# Patient Record
Sex: FEMALE | Race: WHITE | NOT HISPANIC OR LATINO | Employment: OTHER | URBAN - METROPOLITAN AREA
[De-identification: names, ages, dates, MRNs, and addresses within clinical notes are randomized per-mention and may not be internally consistent; named-entity substitution may affect disease eponyms.]

---

## 2017-01-03 ENCOUNTER — OFFICE VISIT (OUTPATIENT)
Dept: FAMILY MEDICINE CLINIC | Facility: CLINIC | Age: 67
End: 2017-01-03

## 2017-01-03 VITALS
TEMPERATURE: 97.9 F | HEIGHT: 68 IN | WEIGHT: 148 LBS | SYSTOLIC BLOOD PRESSURE: 120 MMHG | BODY MASS INDEX: 22.43 KG/M2 | HEART RATE: 71 BPM | DIASTOLIC BLOOD PRESSURE: 68 MMHG | OXYGEN SATURATION: 98 %

## 2017-01-03 DIAGNOSIS — M54.2 CHRONIC NECK PAIN: ICD-10-CM

## 2017-01-03 DIAGNOSIS — G89.29 CHRONIC NECK PAIN: ICD-10-CM

## 2017-01-03 DIAGNOSIS — M47.812 SPONDYLOSIS OF CERVICAL REGION WITHOUT MYELOPATHY OR RADICULOPATHY: ICD-10-CM

## 2017-01-03 PROCEDURE — 99213 OFFICE O/P EST LOW 20 MIN: CPT | Performed by: FAMILY MEDICINE

## 2017-01-03 RX ORDER — BACLOFEN 10 MG/1
10 TABLET ORAL DAILY
Qty: 30 TABLET | Refills: 5 | Status: SHIPPED | OUTPATIENT
Start: 2017-01-03 | End: 2021-08-13

## 2017-01-03 RX ORDER — HYDROCODONE BITARTRATE AND ACETAMINOPHEN 10; 325 MG/1; MG/1
1 TABLET ORAL EVERY 6 HOURS PRN
Qty: 120 TABLET | Refills: 0 | Status: SHIPPED | OUTPATIENT
Start: 2017-01-03 | End: 2017-02-01 | Stop reason: SDUPTHER

## 2017-01-03 NOTE — MR AVS SNAPSHOT
Marci TRIVEDI Claudio   1/3/2017 3:30 PM   Office Visit    Dept Phone:  [phone]   Encounter #:  [id number]    Provider:  Jimbo Vuong MD   Department:  [facility]                Your Full Care Plan              Where to Get Your Medications      These medications were sent to [facility] [place] [address] - [address] [place] [address] - [phone]  - [phone] [address] [address]     Phone:  [phone]     baclofen 10 MG tablet         You can get these medications from any pharmacy     Bring a paper prescription for each of these medications     HYDROcodone-acetaminophen  MG per tablet            Your Updated Medication List          This list is accurate as of: 1/3/17  3:56 PM.  Always use your most recent med list.                baclofen 10 MG tablet   Commonly known as:  LIORESAL   Take 1 tablet by mouth Daily.       buPROPion  MG 24 hr tablet   Commonly known as:  WELLBUTRIN XL   Take 1 tablet by mouth 2 (Two) Times a Day.       cetirizine 10 MG tablet   Commonly known as:  zyrTEC       HYDROcodone-acetaminophen  MG per tablet   Commonly known as:  NORCO   Take 1 tablet by mouth Every 6 (Six) Hours As Needed for moderate pain (4-6).       levothyroxine 25 MCG tablet   Commonly known as:  SYNTHROID, LEVOTHROID   Take 1 tablet by mouth Daily.       lidocaine 5 % ointment   Commonly known as:  XYLOCAINE       Melatonin 10 MG capsule       methscopolamine 2.5 MG tablet   Commonly known as:  PAMINE       montelukast 10 MG tablet   Commonly known as:  SINGULAIR       naproxen 500 MG tablet   Commonly known as:  NAPROSYN   Take 1 tablet by mouth 2 (Two) Times a Day.       olopatadine 0.6 % solution nasal solution   Commonly known as:  PATANASE       PATADAY 0.2 % solution ophthalmic solution   Generic drug:  olopatadine       predniSONE 20 MG tablet   Commonly known as:   DELTASONE   1 po BID X 5 days then 1 po QD X 5 days       sertraline 25 MG tablet   Commonly known as:  ZOLOFT   Take 1 tablet by mouth Daily.       SSD 1 % cream   Generic drug:  silver sulfadiazine       STAHIST AD 25-60 MG tablet   Generic drug:  Chlorcyclizine-Pseudoephed       terbinafine 250 MG tablet   Commonly known as:  lamiSIL       triamcinolone 0.1 % cream   Commonly known as:  KENALOG   Apply  topically 2 (Two) Times a Day.       Vitamin D3 2000 UNITS tablet               You Were Diagnosed With        Codes Comments    Spondylosis of cervical region without myelopathy or radiculopathy     ICD-10-CM: M47.812  ICD-9-CM: 721.0     Chronic neck pain     ICD-10-CM: M54.2, G89.29  ICD-9-CM: 723.1, 338.29       Medications to be Given to You by a Medical Professional     Due       Frequency    5/26/2016 silver sulfadiazine (SILVADENE, SSD) 1 % cream  Every 12 Hours Scheduled      Instructions     None    Patient Instructions History      Upcoming Appointments     Visit Type Date Time Department    FOLLOW UP 1/3/2017  3:30 PM E  [facility]    OFFICE VISIT 2/1/2017  3:30 PM [facility]      MyChart Signup     Our records indicate that you have declined [facility] sign[facility]. If you would like to sign up for [employer], please email [email or web address] or call [phone] to obtain an activation code.             Other Info from Your Visit           Your Appointments     Feb 01, 2017  3:30 PM EST   Office Visit with Jimbo Vuong MD   [facility] (--)    [address] [place] [address] [address] [place] [id number] [phone]           Arrive 15 minutes prior to appointment.              Allergies     Penicillins        Reason for Visit     Back Pain Did not like pain clinic at [facility].  Written for gabapentin, Norco, and robaxin.  She has not taken any of his meds.  She would like to try to get into [facility] or in [place].  Would  "like to get refills on baclofen if possible      Vital Signs     Blood Pressure Pulse Temperature Height Weight Oxygen Saturation    120/68 71 97.9 °F (36.6 °C) 68\" (172.7 cm) 148 lb (67.1 kg) 98%    Body Mass Index Smoking Status                22.5 kg/m2 Never Smoker          Problems and Diagnoses Noted     Degenerative arthritis of cervical spine        Chronic neck pain            "

## 2017-01-03 NOTE — PROGRESS NOTES
"Subjective   Marci Snyder is a 66 y.o. female    HPI Comments: Patient returns for recheck regarding her chronic cervical pain and chronic back pain.  She had an appointment at the pain clinic in [place] but she reports that she will not be returning there as a physician and staff did not appear \"legitimate\".  She brings the hydrocodone prescription that she was given with her today so that we can destroy it she did get a prescription filled for gabapentin and Robaxin but is not taking them.  She wants to try to find another pain management group near her home.  She is trying to make an appointment at the [facility] pain treatment clinic.  Her Octavio is reviewed and is appropriate and consistent with the patient's history.    Back Pain   Pertinent negatives include no numbness or weakness.       The following portions of the patient's history were reviewed and updated as appropriate: allergies, current medications, past social history and problem list    Review of Systems   Constitutional: Negative.    Respiratory: Negative.    Gastrointestinal: Negative.    Musculoskeletal: Positive for back pain, neck pain and neck stiffness. Negative for arthralgias, gait problem and myalgias.   Neurological: Negative for dizziness, tremors, weakness and numbness.   Psychiatric/Behavioral: Negative for behavioral problems and dysphoric mood. The patient is not nervous/anxious.        Objective     Vitals:    01/03/17 1523   BP: 120/68   Pulse: 71   Temp: 97.9 °F (36.6 °C)   SpO2: 98%       Physical Exam   Constitutional: She is oriented to person, place, and time. She appears well-developed and well-nourished.   Eyes: Conjunctivae are normal. Pupils are equal, round, and reactive to light.   Neck: Muscular tenderness present. Decreased range of motion present.   Cardiovascular: Normal rate and regular rhythm.    Pulmonary/Chest: Effort normal and breath sounds normal.   Abdominal: Soft. Bowel sounds are " normal.   Musculoskeletal:        Lumbar back: She exhibits decreased range of motion, tenderness, bony tenderness and pain. She exhibits no swelling, no deformity and no spasm.   Neurological: She is alert and oriented to person, place, and time. She has normal reflexes.   Skin: Skin is warm and dry.   Psychiatric: She has a normal mood and affect. Her behavior is normal.   Nursing note and vitals reviewed.      Assessment/Plan   Problem List Items Addressed This Visit     None      Visit Diagnoses     Spondylosis of cervical region without myelopathy or radiculopathy        Relevant Medications    HYDROcodone-acetaminophen (NORCO)  MG per tablet    baclofen (LIORESAL) 10 MG tablet    Chronic neck pain        Relevant Medications    HYDROcodone-acetaminophen (NORCO)  MG per tablet    baclofen (LIORESAL) 10 MG tablet

## 2017-02-01 ENCOUNTER — OFFICE VISIT (OUTPATIENT)
Dept: FAMILY MEDICINE CLINIC | Facility: CLINIC | Age: 67
End: 2017-02-01

## 2017-02-01 VITALS
BODY MASS INDEX: 22.43 KG/M2 | OXYGEN SATURATION: 97 % | HEIGHT: 68 IN | TEMPERATURE: 98.2 F | HEART RATE: 79 BPM | WEIGHT: 148 LBS | SYSTOLIC BLOOD PRESSURE: 110 MMHG | DIASTOLIC BLOOD PRESSURE: 84 MMHG

## 2017-02-01 DIAGNOSIS — M54.2 CHRONIC NECK PAIN: ICD-10-CM

## 2017-02-01 DIAGNOSIS — G89.29 CHRONIC NECK PAIN: ICD-10-CM

## 2017-02-01 DIAGNOSIS — M47.812 SPONDYLOSIS OF CERVICAL REGION WITHOUT MYELOPATHY OR RADICULOPATHY: ICD-10-CM

## 2017-02-01 PROCEDURE — 99213 OFFICE O/P EST LOW 20 MIN: CPT | Performed by: FAMILY MEDICINE

## 2017-02-01 RX ORDER — HYDROCODONE BITARTRATE AND ACETAMINOPHEN 10; 325 MG/1; MG/1
1 TABLET ORAL EVERY 6 HOURS PRN
Qty: 120 TABLET | Refills: 0 | Status: SHIPPED | OUTPATIENT
Start: 2017-02-01 | End: 2017-03-01 | Stop reason: SDUPTHER

## 2017-02-01 NOTE — PROGRESS NOTES
Subjective   Marci Snyder is a 66 y.o. female    HPI Comments: Patient presents for follow-up regarding her chronic neck pain and degenerative disc disease of the cervical spine.  She is due for refill on her hydrocodone.  Her Octavio is reviewed and is appropriate.  She is worried because she is a candidate for jury duty and knows that she would be unable to sit as a chore due to her chronic pain and use of chronic pain medication.  I have advised her to complete her questionnaire and return this to the Court.  We will draft a letter for her asking that the Court dismiss her as a potential jury candidate.    We discussed her sisters recent hospitalization and surgeries for blood clots in her right leg and left arm.    Back Pain   Pertinent negatives include no numbness or weakness.       The following portions of the patient's history were reviewed and updated as appropriate: allergies, current medications, past social history and problem list    Review of Systems   Constitutional: Negative.    Respiratory: Negative.    Gastrointestinal: Negative.    Musculoskeletal: Positive for back pain, neck pain and neck stiffness. Negative for arthralgias, gait problem and myalgias.   Neurological: Negative for dizziness, tremors, weakness and numbness.   Psychiatric/Behavioral: Negative for behavioral problems and dysphoric mood. The patient is not nervous/anxious.        Objective     Vitals:    02/01/17 1517   BP: 110/84   Pulse: 79   Temp: 98.2 °F (36.8 °C)   SpO2: 97%       Physical Exam   Constitutional: She is oriented to person, place, and time. She appears well-developed and well-nourished.   Eyes: Conjunctivae are normal. Pupils are equal, round, and reactive to light.   Neck: Muscular tenderness present. Decreased range of motion present.   Cardiovascular: Normal rate and regular rhythm.    Pulmonary/Chest: Effort normal and breath sounds normal.   Abdominal: Soft. Bowel sounds are normal.   Musculoskeletal:         Lumbar back: She exhibits decreased range of motion, tenderness, bony tenderness and pain. She exhibits no swelling, no deformity and no spasm.   Neurological: She is alert and oriented to person, place, and time. She has normal reflexes.   Skin: Skin is warm and dry.   Psychiatric: She has a normal mood and affect. Her behavior is normal.   Nursing note and vitals reviewed.      Assessment/Plan   Problem List Items Addressed This Visit     None      Visit Diagnoses     Spondylosis of cervical region without myelopathy or radiculopathy        Relevant Medications    HYDROcodone-acetaminophen (NORCO)  MG per tablet    Chronic neck pain        Relevant Medications    HYDROcodone-acetaminophen (NORCO)  MG per tablet

## 2017-03-01 ENCOUNTER — OFFICE VISIT (OUTPATIENT)
Dept: FAMILY MEDICINE CLINIC | Facility: CLINIC | Age: 67
End: 2017-03-01

## 2017-03-01 VITALS
DIASTOLIC BLOOD PRESSURE: 78 MMHG | HEIGHT: 68 IN | TEMPERATURE: 99.1 F | SYSTOLIC BLOOD PRESSURE: 112 MMHG | OXYGEN SATURATION: 98 % | WEIGHT: 148 LBS | BODY MASS INDEX: 22.43 KG/M2 | HEART RATE: 76 BPM

## 2017-03-01 DIAGNOSIS — R60.0 BILATERAL EDEMA OF LOWER EXTREMITY: ICD-10-CM

## 2017-03-01 DIAGNOSIS — K06.8 GINGIVAL ULCER: ICD-10-CM

## 2017-03-01 DIAGNOSIS — M47.812 SPONDYLOSIS OF CERVICAL REGION WITHOUT MYELOPATHY OR RADICULOPATHY: Primary | ICD-10-CM

## 2017-03-01 DIAGNOSIS — G89.29 CHRONIC NECK PAIN: ICD-10-CM

## 2017-03-01 DIAGNOSIS — M54.2 CHRONIC NECK PAIN: ICD-10-CM

## 2017-03-01 PROCEDURE — 99213 OFFICE O/P EST LOW 20 MIN: CPT | Performed by: FAMILY MEDICINE

## 2017-03-01 RX ORDER — HYDROCODONE BITARTRATE AND ACETAMINOPHEN 10; 325 MG/1; MG/1
1 TABLET ORAL EVERY 6 HOURS PRN
Qty: 120 TABLET | Refills: 0 | Status: SHIPPED | OUTPATIENT
Start: 2017-03-01 | End: 2019-04-10

## 2017-03-01 RX ORDER — SULFAMETHOXAZOLE AND TRIMETHOPRIM 200; 40 MG/5ML; MG/5ML
10 SUSPENSION ORAL 4 TIMES DAILY
Qty: 180 ML | Refills: 1 | Status: SHIPPED | OUTPATIENT
Start: 2017-03-01 | End: 2018-10-26

## 2017-03-01 NOTE — PROGRESS NOTES
Subjective   Marci Snyder is a 66 y.o. female    HPI Comments: Patient presents for recheck regarding her chronic neck pain secondary to cervical spondylosis.  She is due for refill on her hydrocodone.  She has an appointment at the end of March with a pain clinic in [facility].  Her Octavio report is reviewed and is appropriate.  She reports intermittent edema of the lower extremities for which she has taken periodic diuretics in the past and would like a refill of her medication.  She is also complaining of an ulceration along her left lower gumline that she is unable to clear with antibiotics or over-the-counter mouthwash.    Neck Pain    Pertinent negatives include no chest pain, numbness, trouble swallowing or weakness.   Leg Swelling   Associated symptoms include neck pain and a sore throat. Pertinent negatives include no arthralgias, chest pain, myalgias, numbness or weakness.   Sore Throat    Associated symptoms include neck pain. Pertinent negatives include no trouble swallowing.       The following portions of the patient's history were reviewed and updated as appropriate: allergies, current medications, past social history and problem list    Review of Systems   Constitutional: Negative.    HENT: Positive for sore throat. Negative for postnasal drip, sinus pressure and trouble swallowing.    Respiratory: Negative.    Cardiovascular: Positive for leg swelling. Negative for chest pain and palpitations.   Gastrointestinal: Negative.    Musculoskeletal: Positive for back pain, neck pain and neck stiffness. Negative for arthralgias, gait problem and myalgias.   Neurological: Negative for dizziness, tremors, weakness and numbness.   Psychiatric/Behavioral: Negative for behavioral problems and dysphoric mood. The patient is not nervous/anxious.        Objective     Vitals:    03/01/17 1351   BP: 112/78   Pulse: 76   Temp: 99.1 °F (37.3 °C)   SpO2: 98%       Physical Exam   Constitutional: She is  oriented to person, place, and time. She appears well-developed and well-nourished.   HENT:   Head: Normocephalic.   Gingival ulcer   Eyes: Conjunctivae are normal. Pupils are equal, round, and reactive to light.   Neck: Muscular tenderness present. Decreased range of motion present.   Cardiovascular: Normal rate and regular rhythm.    Pulmonary/Chest: Effort normal and breath sounds normal.   Abdominal: Soft. Bowel sounds are normal.   Musculoskeletal:        Lumbar back: She exhibits decreased range of motion, tenderness, bony tenderness and pain. She exhibits no swelling, no deformity and no spasm.   Neurological: She is alert and oriented to person, place, and time. She has normal reflexes.   Skin: Skin is warm and dry.   Psychiatric: She has a normal mood and affect. Her behavior is normal.   Nursing note and vitals reviewed.      Assessment/Plan   Problem List Items Addressed This Visit     None      Visit Diagnoses     Spondylosis of cervical region without myelopathy or radiculopathy    -  Primary    Relevant Medications    HYDROcodone-acetaminophen (NORCO)  MG per tablet    Chronic neck pain        Relevant Medications    HYDROcodone-acetaminophen (NORCO)  MG per tablet    Gingival ulcer        Relevant Medications    sulfamethoxazole-trimethoprim (BACTRIM,SEPTRA) 200-40 MG/5ML suspension    Bilateral edema of lower extremity        Relevant Medications    triamterene-hydrochlorothiazide (DYAZIDE) 50-25 MG per capsule

## 2017-03-27 ENCOUNTER — TRANSCRIBE ORDERS (OUTPATIENT)
Dept: FAMILY MEDICINE CLINIC | Facility: CLINIC | Age: 67
End: 2017-03-27

## 2017-03-27 DIAGNOSIS — Z12.31 VISIT FOR SCREENING MAMMOGRAM: Primary | ICD-10-CM

## 2017-04-04 ENCOUNTER — OFFICE VISIT (OUTPATIENT)
Dept: FAMILY MEDICINE CLINIC | Facility: CLINIC | Age: 67
End: 2017-04-04

## 2017-04-04 VITALS
OXYGEN SATURATION: 98 % | HEART RATE: 76 BPM | DIASTOLIC BLOOD PRESSURE: 70 MMHG | SYSTOLIC BLOOD PRESSURE: 122 MMHG | TEMPERATURE: 98.1 F | HEIGHT: 68 IN | BODY MASS INDEX: 22.73 KG/M2 | WEIGHT: 150 LBS

## 2017-04-04 DIAGNOSIS — R21 RASH: ICD-10-CM

## 2017-04-04 DIAGNOSIS — M19.012 PRIMARY OSTEOARTHRITIS OF LEFT SHOULDER: Primary | ICD-10-CM

## 2017-04-04 PROCEDURE — 99213 OFFICE O/P EST LOW 20 MIN: CPT | Performed by: FAMILY MEDICINE

## 2017-04-04 PROCEDURE — 20610 DRAIN/INJ JOINT/BURSA W/O US: CPT | Performed by: FAMILY MEDICINE

## 2017-04-04 RX ORDER — MUPIROCIN CALCIUM 20 MG/G
CREAM TOPICAL 3 TIMES DAILY
Qty: 15 G | Refills: 2
Start: 2017-04-04 | End: 2021-08-13

## 2017-04-04 NOTE — PROGRESS NOTES
Subjective   Marci Snyder is a 66 y.o. female    HPI Comments: Patient presents today complaining of worsening of her right shoulder pain.  She has a history of both bursitis and arthritis in her shoulder.  She has been seeing a chiropractor who previously did help control her pain but it is gotten much worse.  We've previously done injections and she has responded very well to these.  Her last injection was July 2015.  She also complains today of a rash on her upper lip she is unsure what caused it.  She tried some hydrocortisone cream but it seemed to get worse.    Rash   Pertinent negatives include no congestion, cough, fever, shortness of breath, sore throat or vomiting.       The following portions of the patient's history were reviewed and updated as appropriate: allergies, current medications, past social history and problem list    Review of Systems   Constitutional: Negative for chills and fever.   HENT: Negative for congestion, ear pain, sinus pressure and sore throat.    Eyes: Negative for redness and itching.   Respiratory: Negative for cough, shortness of breath and wheezing.    Cardiovascular: Negative for chest pain and palpitations.   Gastrointestinal: Negative for abdominal pain, nausea and vomiting.   Musculoskeletal: Positive for arthralgias, back pain and neck pain.   Skin: Positive for rash.   Neurological: Negative for weakness and numbness.   Hematological: Negative for adenopathy. Does not bruise/bleed easily.       Objective     Vitals:    04/04/17 1503   BP: 122/70   Pulse: 76   Temp: 98.1 °F (36.7 °C)   SpO2: 98%       Physical Exam   Constitutional: She is oriented to person, place, and time. She appears well-developed and well-nourished.   HENT:   Head: Normocephalic.   Mouth/Throat: Oropharynx is clear and moist.   Eyes: Conjunctivae are normal. Pupils are equal, round, and reactive to light.   Cardiovascular: Normal rate and regular rhythm.    Pulmonary/Chest: Effort normal and  breath sounds normal.   Musculoskeletal:        Right shoulder: She exhibits decreased range of motion, tenderness, bony tenderness, crepitus and decreased strength. She exhibits no swelling, no effusion, no deformity, no spasm and normal pulse.   PROCEDURE: Right shoulder is prepped with Betadine anteriorly.  Under sterile setting 1 mL of Kenalog 10 and 2 mL of Marcaine are injected into the glenohumeral joint using anterior approach.  Patient tolerated the procedure without adverse event.  Her shoulder was taken to range of motion after procedure without restriction.  Band-Aid applied to injection site.   Neurological: She is alert and oriented to person, place, and time.   Nursing note and vitals reviewed.      Assessment/Plan   Problem List Items Addressed This Visit        Musculoskeletal and Integument    Osteoarthritis of shoulder - Primary      Other Visit Diagnoses     Rash        Relevant Medications    mupirocin (BACTROBAN) 2 % cream

## 2017-04-05 RX ORDER — METHYLPREDNISOLONE ACETATE 40 MG/ML
40 INJECTION, SUSPENSION INTRA-ARTICULAR; INTRALESIONAL; INTRAMUSCULAR; SOFT TISSUE ONCE
Status: COMPLETED | OUTPATIENT
Start: 2017-04-05 | End: 2017-04-05

## 2017-04-05 RX ADMIN — METHYLPREDNISOLONE ACETATE 40 MG: 40 INJECTION, SUSPENSION INTRA-ARTICULAR; INTRALESIONAL; INTRAMUSCULAR; SOFT TISSUE at 10:49

## 2017-04-18 ENCOUNTER — APPOINTMENT (OUTPATIENT)
Dept: MAMMOGRAPHY | Facility: HOSPITAL | Age: 67
End: 2017-04-18

## 2017-05-18 ENCOUNTER — APPOINTMENT (OUTPATIENT)
Dept: MAMMOGRAPHY | Facility: HOSPITAL | Age: 67
End: 2017-05-18

## 2017-05-26 ENCOUNTER — TELEPHONE (OUTPATIENT)
Dept: FAMILY MEDICINE CLINIC | Facility: CLINIC | Age: 67
End: 2017-05-26

## 2017-05-26 DIAGNOSIS — M25.511 CHRONIC RIGHT SHOULDER PAIN: Primary | ICD-10-CM

## 2017-05-26 DIAGNOSIS — G89.29 CHRONIC RIGHT SHOULDER PAIN: Primary | ICD-10-CM

## 2017-06-29 ENCOUNTER — APPOINTMENT (OUTPATIENT)
Dept: MAMMOGRAPHY | Facility: HOSPITAL | Age: 67
End: 2017-06-29

## 2017-07-05 ENCOUNTER — OFFICE VISIT (OUTPATIENT)
Dept: FAMILY MEDICINE CLINIC | Facility: CLINIC | Age: 67
End: 2017-07-05

## 2017-07-05 VITALS
TEMPERATURE: 98.5 F | DIASTOLIC BLOOD PRESSURE: 70 MMHG | OXYGEN SATURATION: 98 % | WEIGHT: 150 LBS | HEIGHT: 68 IN | SYSTOLIC BLOOD PRESSURE: 126 MMHG | HEART RATE: 72 BPM | BODY MASS INDEX: 22.73 KG/M2

## 2017-07-05 DIAGNOSIS — M81.0 OSTEOPOROSIS: ICD-10-CM

## 2017-07-05 DIAGNOSIS — M19.012 PRIMARY OSTEOARTHRITIS OF LEFT SHOULDER: ICD-10-CM

## 2017-07-05 DIAGNOSIS — E03.9 ACQUIRED HYPOTHYROIDISM: ICD-10-CM

## 2017-07-05 DIAGNOSIS — K13.70 ORAL MUCOSAL LESION: Primary | ICD-10-CM

## 2017-07-05 DIAGNOSIS — F32.A DEPRESSIVE DISORDER: ICD-10-CM

## 2017-07-05 PROCEDURE — 99214 OFFICE O/P EST MOD 30 MIN: CPT | Performed by: FAMILY MEDICINE

## 2017-07-05 RX ORDER — DOXYCYCLINE HYCLATE 100 MG/1
100 CAPSULE ORAL 2 TIMES DAILY
Qty: 20 CAPSULE | Refills: 0 | Status: SHIPPED | OUTPATIENT
Start: 2017-07-05 | End: 2018-10-26

## 2017-07-05 RX ORDER — LEVOTHYROXINE SODIUM 0.03 MG/1
25 TABLET ORAL DAILY
Qty: 90 TABLET | Refills: 3 | Status: SHIPPED | OUTPATIENT
Start: 2017-07-05 | End: 2017-12-06 | Stop reason: SDUPTHER

## 2017-07-05 RX ORDER — NAPROXEN 500 MG/1
500 TABLET ORAL 2 TIMES DAILY
Qty: 180 TABLET | Refills: 3 | Status: SHIPPED | OUTPATIENT
Start: 2017-07-05 | End: 2017-12-06 | Stop reason: SDUPTHER

## 2017-07-05 RX ORDER — ESTRADIOL 0.1 MG/G
CREAM VAGINAL
Refills: 1 | COMMUNITY
Start: 2017-05-09 | End: 2021-08-13

## 2017-07-05 RX ORDER — LORATADINE 10 MG/1
TABLET ORAL
Refills: 0 | COMMUNITY
Start: 2017-04-30 | End: 2017-12-06 | Stop reason: SDUPTHER

## 2017-07-05 RX ORDER — BUPROPION HYDROCHLORIDE 150 MG/1
150 TABLET ORAL 2 TIMES DAILY
Qty: 180 TABLET | Refills: 3 | Status: SHIPPED | OUTPATIENT
Start: 2017-07-05 | End: 2017-12-06 | Stop reason: SDUPTHER

## 2017-07-05 RX ORDER — METHOCARBAMOL 500 MG/1
TABLET, FILM COATED ORAL
Refills: 0 | COMMUNITY
Start: 2017-05-09 | End: 2021-05-20 | Stop reason: SDUPTHER

## 2017-07-05 NOTE — PROGRESS NOTES
Subjective   Marci Snyder is a 66 y.o. female    HPI Comments: Patient presents today concerned about a lesion on her right gum that has been present for 2 months or more.  She recently saw her dentist who prescribed dexamethasone suspension to swish and spit 3 times a day which she has done but has not seen any improvement.  Her dentist told her that if it didn't resolve that he would have to do a biopsy but she would like to be referred for a second opinion.    Recheck regarding depression, osteoarthritis and hypothyroidism.  She is due for refills on her medications.  She feels her conditions are stable.  She is due for lab testing and in fact is due for a physical and Medicare wellness visit which she says she will schedule in the future.    Depression Patient presents with the following symptoms: nervousness/anxiety.  Patient is not experiencing: confusion, decreased concentration, palpitations, shortness of breath and suicidal ideas.    Hypothyroidism   Pertinent negatives include no abdominal pain, arthralgias, chest pain, fatigue, headaches, myalgias, nausea, numbness or weakness.   Back Pain   Pertinent negatives include no abdominal pain, chest pain, headaches, numbness or weakness.   Mouth Lesions    Associated symptoms include mouth sores. Pertinent negatives include no abdominal pain, no constipation, no diarrhea, no nausea and no headaches.       The following portions of the patient's history were reviewed and updated as appropriate: allergies, current medications, past social history and problem list    Review of Systems   Constitutional: Negative.  Negative for appetite change, fatigue and unexpected weight change.   HENT: Positive for mouth sores.    Eyes: Negative for visual disturbance.   Respiratory: Negative.  Negative for chest tightness and shortness of breath.    Cardiovascular: Negative for chest pain and palpitations.   Gastrointestinal: Negative.  Negative for abdominal pain,  constipation, diarrhea and nausea.   Endocrine: Negative for cold intolerance and heat intolerance.   Musculoskeletal: Positive for back pain. Negative for arthralgias, gait problem and myalgias.   Neurological: Negative for dizziness, tremors, weakness, light-headedness, numbness and headaches.   Psychiatric/Behavioral: Positive for dysphoric mood and sleep disturbance. Negative for agitation, behavioral problems, confusion, decreased concentration and suicidal ideas. The patient is nervous/anxious.        Objective     Vitals:    07/05/17 1452   BP: 126/70   Pulse: 72   Temp: 98.5 °F (36.9 °C)   SpO2: 98%       Physical Exam   Constitutional: She is oriented to person, place, and time. She appears well-developed and well-nourished.   HENT:   Head: Normocephalic.   Mouth/Throat: Oropharynx is clear and moist.   No Exopthalmos   Eyes: Conjunctivae are normal. Pupils are equal, round, and reactive to light.   Neck: Neck supple. No JVD present. No thyromegaly present.   Cardiovascular: Normal rate and regular rhythm.    Pulmonary/Chest: Effort normal and breath sounds normal.   Abdominal: Soft. Bowel sounds are normal. There is no tenderness.   Musculoskeletal:        Lumbar back: She exhibits decreased range of motion, tenderness, bony tenderness and pain. She exhibits no swelling, no deformity and no spasm.   Lymphadenopathy:     She has no cervical adenopathy.   Neurological: She is alert and oriented to person, place, and time. She has normal reflexes.   Skin: Skin is warm and dry. No rash noted.   Psychiatric: She has a normal mood and affect. Her behavior is normal.   Nursing note and vitals reviewed.      Assessment/Plan   Problem List Items Addressed This Visit        Endocrine    Hypothyroidism    Relevant Medications    levothyroxine (SYNTHROID, LEVOTHROID) 25 MCG tablet       Musculoskeletal and Integument    Osteoporosis    Osteoarthritis of shoulder    Relevant Medications    naproxen (NAPROSYN) 500 MG  tablet       Other    Depressive disorder    Relevant Medications    buPROPion XL (WELLBUTRIN XL) 150 MG 24 hr tablet      Other Visit Diagnoses     Oral mucosal lesion    -  Primary    Relevant Medications    doxycycline (VIBRAMYCIN) 100 MG capsule    Other Relevant Orders    Ambulatory Referral to Oral Maxillofacial Surgery (Completed)

## 2017-07-14 ENCOUNTER — APPOINTMENT (OUTPATIENT)
Dept: MAMMOGRAPHY | Facility: HOSPITAL | Age: 67
End: 2017-07-14

## 2017-07-20 ENCOUNTER — APPOINTMENT (OUTPATIENT)
Dept: MAMMOGRAPHY | Facility: HOSPITAL | Age: 67
End: 2017-07-20

## 2017-08-01 ENCOUNTER — APPOINTMENT (OUTPATIENT)
Dept: MAMMOGRAPHY | Facility: HOSPITAL | Age: 67
End: 2017-08-01

## 2017-08-15 ENCOUNTER — HOSPITAL ENCOUNTER (OUTPATIENT)
Dept: MAMMOGRAPHY | Facility: HOSPITAL | Age: 67
Discharge: HOME | End: 2017-08-15
Admitting: FAMILY MEDICINE

## 2017-08-15 DIAGNOSIS — Z12.31 VISIT FOR SCREENING MAMMOGRAM: ICD-10-CM

## 2017-08-15 PROCEDURE — 77063 BREAST TOMOSYNTHESIS BI: CPT

## 2017-08-15 PROCEDURE — G0202 SCR MAMMO BI INCL CAD: HCPCS | Performed by: RADIOLOGY

## 2017-08-15 PROCEDURE — G0202 SCR MAMMO BI INCL CAD: HCPCS

## 2017-08-15 PROCEDURE — 77063 BREAST TOMOSYNTHESIS BI: CPT | Performed by: RADIOLOGY

## 2017-08-22 DIAGNOSIS — K13.70 ORAL MUCOSAL LESION: ICD-10-CM

## 2017-08-22 RX ORDER — DOXYCYCLINE HYCLATE 100 MG/1
CAPSULE ORAL
Qty: 20 CAPSULE | Refills: 0 | OUTPATIENT
Start: 2017-08-22

## 2017-11-22 RX ORDER — SERTRALINE HYDROCHLORIDE 25 MG/1
TABLET, FILM COATED ORAL
Qty: 30 TABLET | Refills: 11 | Status: SHIPPED | OUTPATIENT
Start: 2017-11-22 | End: 2017-12-06 | Stop reason: SDUPTHER

## 2017-12-06 ENCOUNTER — OFFICE VISIT (OUTPATIENT)
Dept: FAMILY MEDICINE CLINIC | Facility: CLINIC | Age: 67
End: 2017-12-06

## 2017-12-06 VITALS
SYSTOLIC BLOOD PRESSURE: 112 MMHG | OXYGEN SATURATION: 98 % | DIASTOLIC BLOOD PRESSURE: 62 MMHG | HEIGHT: 68 IN | BODY MASS INDEX: 22.13 KG/M2 | WEIGHT: 146 LBS | TEMPERATURE: 98.3 F

## 2017-12-06 DIAGNOSIS — G47.33 OSA (OBSTRUCTIVE SLEEP APNEA): ICD-10-CM

## 2017-12-06 DIAGNOSIS — M19.012 PRIMARY OSTEOARTHRITIS OF LEFT SHOULDER: ICD-10-CM

## 2017-12-06 DIAGNOSIS — Z11.59 NEED FOR HEPATITIS C SCREENING TEST: ICD-10-CM

## 2017-12-06 DIAGNOSIS — G47.00 INSOMNIA, UNSPECIFIED TYPE: ICD-10-CM

## 2017-12-06 DIAGNOSIS — F32.A DEPRESSIVE DISORDER: ICD-10-CM

## 2017-12-06 DIAGNOSIS — E55.9 VITAMIN D DEFICIENCY: ICD-10-CM

## 2017-12-06 DIAGNOSIS — Z00.00 INITIAL MEDICARE ANNUAL WELLNESS VISIT: ICD-10-CM

## 2017-12-06 DIAGNOSIS — E03.9 ACQUIRED HYPOTHYROIDISM: ICD-10-CM

## 2017-12-06 DIAGNOSIS — Z00.00 ROUTINE GENERAL MEDICAL EXAMINATION AT A HEALTH CARE FACILITY: Primary | ICD-10-CM

## 2017-12-06 PROCEDURE — 96160 PT-FOCUSED HLTH RISK ASSMT: CPT | Performed by: FAMILY MEDICINE

## 2017-12-06 PROCEDURE — G0439 PPPS, SUBSEQ VISIT: HCPCS | Performed by: FAMILY MEDICINE

## 2017-12-06 PROCEDURE — 99397 PER PM REEVAL EST PAT 65+ YR: CPT | Performed by: FAMILY MEDICINE

## 2017-12-06 RX ORDER — LORATADINE 10 MG/1
10 TABLET ORAL DAILY
Qty: 90 TABLET | Refills: 3 | Status: SHIPPED | OUTPATIENT
Start: 2017-12-06 | End: 2019-11-08 | Stop reason: SDUPTHER

## 2017-12-06 RX ORDER — MONTELUKAST SODIUM 10 MG/1
10 TABLET ORAL NIGHTLY
Qty: 90 TABLET | Refills: 3 | Status: SHIPPED | OUTPATIENT
Start: 2017-12-06

## 2017-12-06 RX ORDER — LEVOTHYROXINE SODIUM 0.03 MG/1
25 TABLET ORAL DAILY
Qty: 90 TABLET | Refills: 3 | Status: SHIPPED | OUTPATIENT
Start: 2017-12-06 | End: 2019-01-17 | Stop reason: SDUPTHER

## 2017-12-06 RX ORDER — NAPROXEN 500 MG/1
500 TABLET ORAL 2 TIMES DAILY
Qty: 180 TABLET | Refills: 3 | Status: SHIPPED | OUTPATIENT
Start: 2017-12-06 | End: 2019-01-17 | Stop reason: SDUPTHER

## 2017-12-06 RX ORDER — SERTRALINE HYDROCHLORIDE 25 MG/1
25 TABLET, FILM COATED ORAL DAILY
Qty: 90 TABLET | Refills: 3 | Status: SHIPPED | OUTPATIENT
Start: 2017-12-06 | End: 2019-01-17 | Stop reason: SDUPTHER

## 2017-12-06 RX ORDER — BUPROPION HYDROCHLORIDE 150 MG/1
150 TABLET ORAL 2 TIMES DAILY
Qty: 180 TABLET | Refills: 3 | Status: SHIPPED | OUTPATIENT
Start: 2017-12-06 | End: 2018-11-26 | Stop reason: SDUPTHER

## 2017-12-06 RX ORDER — METHSCOPOLAMINE BROMIDE 2.5 MG/1
2.5 TABLET ORAL 2 TIMES DAILY PRN
Qty: 120 TABLET | Refills: 5 | Status: SHIPPED | OUTPATIENT
Start: 2017-12-06

## 2017-12-06 NOTE — PROGRESS NOTES
Subjective   Marci Snyder is a 67 y.o. female    HPI Comments: Patient presents today for annual physical examination and Medicare wellness visit.  Health history is reviewed.  This includes insomnia, allergic rhinitis, osteoarthritis, depression, GERD and hypothyroidism.  She is due for laboratory testing but is not fasting today and wants to do her labs in [place] so she will need printed orders for lab.  Health maintenance issues are reviewed and EMR is updated.  Her primary complaint today is insomnia.  He is been several years since she was diagnosed with obstructive sleep apnea and has not had a follow-up so we will make an arrangement for this to make sure her CPAP is working properly.  She had cataract surgery or earlier this year.  She has had her mammogram for this year.    Insomnia   Pertinent negatives include no abdominal pain, chest pain, fatigue, headaches, nausea or weakness.   Allergies   Pertinent negatives include no abdominal pain, chest pain, fatigue, headaches, nausea or weakness.   Arthritis   Pertinent negatives include no diarrhea or fatigue.   Depression Patient presents with the following symptoms: insomnia.  Patient is not experiencing: confusion, decreased concentration, nervousness/anxiety, palpitations, shortness of breath and suicidal ideas.    Heartburn   She reports no abdominal pain, no chest pain or no nausea. Pertinent negatives include no fatigue.   Hypothyroidism   Pertinent negatives include no abdominal pain, chest pain, fatigue, headaches, nausea or weakness.       The following portions of the patient's history were reviewed and updated as appropriate: allergies, current medications, past social history and problem list    Review of Systems   Constitutional: Negative.  Negative for appetite change, fatigue and unexpected weight change.   HENT: Negative.    Eyes: Negative.  Negative for visual disturbance.   Respiratory: Negative.  Negative for chest tightness and  shortness of breath.    Cardiovascular: Negative.  Negative for chest pain and palpitations.   Gastrointestinal: Negative.  Negative for abdominal pain, constipation, diarrhea and nausea.   Endocrine: Negative.  Negative for cold intolerance and heat intolerance.   Genitourinary: Negative.    Musculoskeletal: Positive for arthritis.   Skin: Negative.    Allergic/Immunologic: Negative.    Neurological: Negative.  Negative for dizziness, tremors, weakness, light-headedness and headaches.   Hematological: Negative.    Psychiatric/Behavioral: Positive for sleep disturbance. Negative for agitation, behavioral problems, confusion, decreased concentration, dysphoric mood, hallucinations and suicidal ideas. The patient has insomnia. The patient is not nervous/anxious and is not hyperactive.    All other systems reviewed and are negative.      Objective     Vitals:    12/06/17 0920   BP: 112/62   Temp: 98.3 °F (36.8 °C)   SpO2: 98%       Physical Exam   Constitutional: She is oriented to person, place, and time. She appears well-developed and well-nourished. No distress.   HENT:   Head: Normocephalic and atraumatic.   Right Ear: External ear normal.   Left Ear: External ear normal.   Nose: Nose normal.   Mouth/Throat: Oropharynx is clear and moist.   No Exopthalmos   Eyes: Conjunctivae and EOM are normal. Pupils are equal, round, and reactive to light.   Neck: Normal range of motion. Neck supple. No JVD present. Carotid bruit is not present. No thyromegaly present.   Cardiovascular: Normal rate, regular rhythm, normal heart sounds and intact distal pulses.    No murmur heard.  Pulmonary/Chest: Effort normal and breath sounds normal.   Abdominal: Soft. Bowel sounds are normal. She exhibits no mass. There is no tenderness.   Musculoskeletal: Normal range of motion. She exhibits no edema.   Lymphadenopathy:     She has no cervical adenopathy.   Neurological: She is alert and oriented to person, place, and time. She has normal  reflexes. No cranial nerve deficit. Coordination normal.   Skin: Skin is warm and dry. She is not diaphoretic.   Psychiatric: She has a normal mood and affect. Her behavior is normal. Judgment and thought content normal. Cognition and memory are normal. She is attentive.   Nursing note and vitals reviewed.      Assessment/Plan   Problem List Items Addressed This Visit        Digestive    Vitamin D deficiency    Relevant Orders    Vitamin D 25 Hydroxy       Endocrine    Hypothyroidism    Relevant Medications    levothyroxine (SYNTHROID, LEVOTHROID) 25 MCG tablet    Other Relevant Orders    TSH    T4, Free       Musculoskeletal and Integument    Osteoarthritis of shoulder    Relevant Medications    naproxen (NAPROSYN) 500 MG tablet       Other    Depressive disorder    Relevant Medications    buPROPion XL (WELLBUTRIN XL) 150 MG 24 hr tablet    sertraline (ZOLOFT) 25 MG tablet      Other Visit Diagnoses     CHIQUITA (obstructive sleep apnea)    -  Primary    Relevant Orders    Ambulatory Referral to Sleep Medicine (Completed)    Insomnia, unspecified type        Relevant Orders    Ambulatory Referral to Sleep Medicine (Completed)    Routine general medical examination at a health care facility        Relevant Orders    Comprehensive Metabolic Panel    Lipid Panel    CBC (No Diff)    Initial Medicare annual wellness visit        Relevant Orders    Comprehensive Metabolic Panel    Lipid Panel    CBC (No Diff)    Need for hepatitis C screening test        Relevant Orders    Hepatitis C Antibody        Patient is counseled during today's visit regarding preventive health measures to include safety in the home, medication safety measures, proper diet issues and appropriate exercise levels.

## 2017-12-06 NOTE — PROGRESS NOTES
QUICK REFERENCE INFORMATION:  The ABCs of the Annual Wellness Visit    [facility] to Medicare Visit    HEALTH RISK ASSESSMENT    1950    Recent Hospitalizations:  No hospitalization(s) within the last year..      Current Medical Providers:  Patient Care Team:  Jimbo Vuong MD as PCP - General  Jimbo Vuong MD as PCP - Family Medicine      Smoking Status:  History   Smoking Status   • Never Smoker   Smokeless Tobacco   • Never Used       Alcohol Consumption:  History   Alcohol Use   • Yes     Comment: social       Depression Screen:   PHQ-2/PHQ-9 Depression Screening 12/6/2017   Little interest or pleasure in doing things 0   Feeling down, depressed, or hopeless 0   Total Score 0       Health Habits and Functional and Cognitive Screening:  Functional & Cognitive Status 12/6/2017   Do you have difficulty preparing food and eating? No   Do you have difficulty bathing yourself, getting dressed or grooming yourself? No   Do you have difficulty using the toilet? No   Do you have difficulty moving around from place to place? No   Do you have trouble with steps or getting out of a bed or a chair? No   In the past year have you fallen or experienced a near fall? No   Current Diet Limited Junk Food   Dental Exam Up to date   Eye Exam Up to date   Exercise (times per week) 7 times per week   Current Exercise Activities Include (No Data)   Do you need help using the phone?  No   Are you deaf or do you have serious difficulty hearing?  Yes   Do you need help with transportation? No   Do you need help shopping? No   Do you need help preparing meals?  No   Do you need help with housework?  No   Do you need help with laundry? No   Do you need help taking your medications? No   Do you need help managing money? No   Have you felt unusual stress, anger or loneliness in the last month? No   Who do you live with? Spouse   If you need help, do you have trouble finding someone available to you? Yes   Have  you been bothered in the last four weeks by sexual problems? No   Do you have difficulty concentrating, remembering or making decisions? No           Does the patient have evidence of cognitive impairment? No    Aspirin use counseling? Does not need ASA (and currently is not on it)      Recent Lab Results:  CMP:     Lipid Panel:     HbA1c:       Visual Acuity:  No exam data present    Age-appropriate Screening Schedule:  Refer to the list below for future screening recommendations based on patient's age, sex and/or medical conditions. Orders for these recommended tests are listed in the plan section. The patient has been provided with a written plan.    Health Maintenance   Topic Date Due   • TDAP/TD VACCINES (1 - Tdap) 12/07/2018 (Originally 10/22/1969)   • DXA SCAN  07/20/2018   • PNEUMOCOCCAL VACCINES (65+ LOW/MEDIUM RISK) (2 of 2 - PPSV23) 09/14/2018   • MAMMOGRAM  08/15/2019   • COLONOSCOPY  10/01/2024   • INFLUENZA VACCINE  Completed   • ZOSTER VACCINE  Completed        Subjective   History of Present Illness    Marci Snyder is a 67 y.o. female an established patient presenting for a Welcome to Medicare Visit.     The following portions of the patient's history were reviewed and updated as appropriate: allergies, current medications, past family history, past medical history, past social history, past surgical history and problem list.    Outpatient Medications Prior to Visit   Medication Sig Dispense Refill   • baclofen (LIORESAL) 10 MG tablet Take 1 tablet by mouth Daily. 30 tablet 5   • cetirizine (ZyrTEC) 10 MG tablet Take 10 mg by mouth daily.     • Chlorcyclizine-Pseudoephed (STAHIST AD) 25-60 MG tablet Take 1 tablet by mouth 2 (two) times a day.     • Cholecalciferol (VITAMIN D3) 2000 UNITS tablet Take  by mouth.     • doxycycline (VIBRAMYCIN) 100 MG capsule Take 1 capsule by mouth 2 (Two) Times a Day. 20 capsule 0   • ESTRACE VAGINAL 0.1 MG/GM vaginal cream   1   • HYDROcodone-acetaminophen  (NORCO)  MG per tablet Take 1 tablet by mouth Every 6 (Six) Hours As Needed for moderate pain (4-6). 120 tablet 0   • lidocaine (XYLOCAINE) 5 % ointment APPLY affected area three times a day if needed  0   • Melatonin 10 MG capsule Take 2 capsules by mouth daily.     • methocarbamol (ROBAXIN) 500 MG tablet   0   • mupirocin (BACTROBAN) 2 % cream Apply  topically 3 (Three) Times a Day. 15 g 2   • olopatadine (PATADAY) 0.2 % solution ophthalmic solution 1 drop daily. 1 drop in each eye ophthalmic daily     • olopatadine (PATANASE) 0.6 % solution nasal solution by Each Nare route 2 (two) times a day.     • predniSONE (DELTASONE) 20 MG tablet 1 po BID X 5 days then 1 po QD X 5 days 15 tablet 1   • sulfamethoxazole-trimethoprim (BACTRIM,SEPTRA) 200-40 MG/5ML suspension Take 10 mL by mouth 4 (Four) Times a Day. 180 mL 1   • terbinafine (LamiSIL) 250 MG tablet Take 250 mg by mouth daily.     • triamcinolone (KENALOG) 0.1 % cream Apply  topically 2 (Two) Times a Day. 28.4 g 3   • triamterene-hydrochlorothiazide (DYAZIDE) 50-25 MG per capsule Take 1 capsule by mouth Every Morning. 30 capsule 2   • buPROPion XL (WELLBUTRIN XL) 150 MG 24 hr tablet Take 1 tablet by mouth 2 (Two) Times a Day. 180 tablet 3   • levothyroxine (SYNTHROID, LEVOTHROID) 25 MCG tablet Take 1 tablet by mouth Daily. 90 tablet 3   • loratadine (CLARITIN) 10 MG tablet take 1 tablet by mouth once daily  0   • methscopolamine (PAMINE) 2.5 MG tablet   0   • montelukast (SINGULAIR) 10 MG tablet   0   • naproxen (NAPROSYN) 500 MG tablet Take 1 tablet by mouth 2 (Two) Times a Day. 180 tablet 3   • sertraline (ZOLOFT) 25 MG tablet take 1 tablet by mouth once daily 30 tablet 11     No facility-administered medications prior to visit.        Patient Active Problem List   Diagnosis   • Osteoporosis   • Vitamin D deficiency   • Sciatica of right side   • Hypothyroidism   • Depressive disorder   • Osteoarthritis of shoulder   • Allergic rhinitis   • Benign  "paroxysmal positional vertigo       Advance Care Planning:  has NO advance directive - information provided to the patient today    Identification of Risk Factors:  Risk factors include: cardiovascular risk.    Review of Systems    Compared to one year ago, the patient feels her physical health is the same.  Compared to one year ago, the patient feels her mental health is the same.    Objective    Physical Exam    Vitals:    12/06/17 0920   BP: 112/62   Temp: 98.3 °F (36.8 °C)   SpO2: 98%   Weight: 66.2 kg (146 lb)   Height: 172 cm (67.72\")       Body mass index is 22.39 kg/(m^2).  Discussed the patient's BMI with her. BMI is within normal parameters. No follow-up required..    Procedure   Procedures       Assessment/Plan   Patient Self-Management and Personalized Health Advice  The patient has been provided with information about: prevention of cardiac or vascular disease and preventive services including:   · Counseling for cardiovascular disease risk reduction.    Visit Diagnoses:    ICD-10-CM ICD-9-CM   1. Routine general medical examination at a health care facility Z00.00 V70.0   2. CHIQUITA (obstructive sleep apnea) G47.33 327.23   3. Primary osteoarthritis of left shoulder M19.012 715.11   4. Acquired hypothyroidism E03.9 244.9   5. Depressive disorder F32.9 311   6. Insomnia, unspecified type G47.00 780.52   7. Initial Medicare annual wellness visit Z00.00 V70.0   8. Need for hepatitis C screening test Z11.59 V73.89   9. Vitamin D deficiency E55.9 268.9       Orders Placed This Encounter   Procedures   • Comprehensive Metabolic Panel   • Lipid Panel   • Vitamin D 25 Hydroxy   • Hepatitis C Antibody   • TSH   • T4, Free   • CBC (No Diff)   • Ambulatory Referral to Sleep Medicine     Referral Priority:   Routine     Referral Type:   Consultation     Referral Reason:   Specialty Services Required     Requested Specialty:   Sleep Medicine     Number of Visits Requested:   1       Outpatient Encounter Prescriptions " as of 12/6/2017   Medication Sig Dispense Refill   • baclofen (LIORESAL) 10 MG tablet Take 1 tablet by mouth Daily. 30 tablet 5   • buPROPion XL (WELLBUTRIN XL) 150 MG 24 hr tablet Take 1 tablet by mouth 2 (Two) Times a Day. 180 tablet 3   • cetirizine (ZyrTEC) 10 MG tablet Take 10 mg by mouth daily.     • Chlorcyclizine-Pseudoephed (STAHIST AD) 25-60 MG tablet Take 1 tablet by mouth 2 (two) times a day.     • Cholecalciferol (VITAMIN D3) 2000 UNITS tablet Take  by mouth.     • doxycycline (VIBRAMYCIN) 100 MG capsule Take 1 capsule by mouth 2 (Two) Times a Day. 20 capsule 0   • ESTRACE VAGINAL 0.1 MG/GM vaginal cream   1   • HYDROcodone-acetaminophen (NORCO)  MG per tablet Take 1 tablet by mouth Every 6 (Six) Hours As Needed for moderate pain (4-6). 120 tablet 0   • levothyroxine (SYNTHROID, LEVOTHROID) 25 MCG tablet Take 1 tablet by mouth Daily. 90 tablet 3   • lidocaine (XYLOCAINE) 5 % ointment APPLY affected area three times a day if needed  0   • loratadine (CLARITIN) 10 MG tablet Take 1 tablet by mouth Daily. 90 tablet 3   • Melatonin 10 MG capsule Take 2 capsules by mouth daily.     • methocarbamol (ROBAXIN) 500 MG tablet   0   • methscopolamine (PAMINE) 2.5 MG tablet Take 1 tablet by mouth 2 (Two) Times a Day As Needed (congestion). 120 tablet 5   • montelukast (SINGULAIR) 10 MG tablet Take 1 tablet by mouth Every Night. 90 tablet 3   • mupirocin (BACTROBAN) 2 % cream Apply  topically 3 (Three) Times a Day. 15 g 2   • naproxen (NAPROSYN) 500 MG tablet Take 1 tablet by mouth 2 (Two) Times a Day. 180 tablet 3   • olopatadine (PATADAY) 0.2 % solution ophthalmic solution 1 drop daily. 1 drop in each eye ophthalmic daily     • olopatadine (PATANASE) 0.6 % solution nasal solution by Each Nare route 2 (two) times a day.     • predniSONE (DELTASONE) 20 MG tablet 1 po BID X 5 days then 1 po QD X 5 days 15 tablet 1   • sertraline (ZOLOFT) 25 MG tablet Take 1 tablet by mouth Daily. 90 tablet 3   •  sulfamethoxazole-trimethoprim (BACTRIM,SEPTRA) 200-40 MG/5ML suspension Take 10 mL by mouth 4 (Four) Times a Day. 180 mL 1   • terbinafine (LamiSIL) 250 MG tablet Take 250 mg by mouth daily.     • triamcinolone (KENALOG) 0.1 % cream Apply  topically 2 (Two) Times a Day. 28.4 g 3   • triamterene-hydrochlorothiazide (DYAZIDE) 50-25 MG per capsule Take 1 capsule by mouth Every Morning. 30 capsule 2   • [DISCONTINUED] buPROPion XL (WELLBUTRIN XL) 150 MG 24 hr tablet Take 1 tablet by mouth 2 (Two) Times a Day. 180 tablet 3   • [DISCONTINUED] levothyroxine (SYNTHROID, LEVOTHROID) 25 MCG tablet Take 1 tablet by mouth Daily. 90 tablet 3   • [DISCONTINUED] loratadine (CLARITIN) 10 MG tablet take 1 tablet by mouth once daily  0   • [DISCONTINUED] methscopolamine (PAMINE) 2.5 MG tablet   0   • [DISCONTINUED] montelukast (SINGULAIR) 10 MG tablet   0   • [DISCONTINUED] naproxen (NAPROSYN) 500 MG tablet Take 1 tablet by mouth 2 (Two) Times a Day. 180 tablet 3   • [DISCONTINUED] sertraline (ZOLOFT) 25 MG tablet take 1 tablet by mouth once daily 30 tablet 11     No facility-administered encounter medications on file as of 12/6/2017.        Reviewed use of high risk medication in the elderly: not applicable  Reviewed for potential of harmful drug interactions in the elderly: not applicable    Follow Up:  Return in about 1 year (around 12/6/2018) for Annual physical, Medicare Wellness.     An After Visit Summary and PPPS with all of these plans were given to the patient.

## 2018-07-25 ENCOUNTER — OFFICE VISIT (OUTPATIENT)
Dept: ORTHOPEDIC SURGERY | Facility: CLINIC | Age: 68
End: 2018-07-25

## 2018-07-25 VITALS — WEIGHT: 146 LBS | HEART RATE: 80 BPM | HEIGHT: 69 IN | OXYGEN SATURATION: 98 % | BODY MASS INDEX: 21.62 KG/M2

## 2018-07-25 DIAGNOSIS — M79.671 PAIN IN BOTH FEET: Primary | ICD-10-CM

## 2018-07-25 DIAGNOSIS — M21.6X2 ACQUIRED METATARSUS VARUS OF LEFT FOOT: ICD-10-CM

## 2018-07-25 DIAGNOSIS — M79.672 PAIN IN BOTH FEET: Primary | ICD-10-CM

## 2018-07-25 DIAGNOSIS — M21.6X1 ACQUIRED METATARSUS VARUS OF RIGHT FOOT: ICD-10-CM

## 2018-07-25 DIAGNOSIS — M24.574 CONTRACTURE OF JOINT OF RIGHT FOOT: ICD-10-CM

## 2018-07-25 DIAGNOSIS — Z79.899 POLYPHARMACY: ICD-10-CM

## 2018-07-25 PROCEDURE — 99204 OFFICE O/P NEW MOD 45 MIN: CPT | Performed by: ORTHOPAEDIC SURGERY

## 2018-07-25 NOTE — PROGRESS NOTES
NEW PATIENT    Patient: Marci Snyder  : 1950    Primary Care Provider: MARCELLA Vuong MD    Requesting Provider: As above    Pain of the Left Foot and Pain of the Right Foot      History    Chief Complaint: Bilateral foot pain    History of Present Illness: This is a very pleasant 67-year-old woman with bilateral foot pain.  She is very active, she likes to hike.  She has a long history of bilateral bunions that have gotten more painful over time.  She has a positive family history of bunions, but also a history of wearing high heels for 30 years.  Her left foot is currently the most troublesome over the bunion.  She had an infection earlier this year when a bunion pad pulled some skin off.  She reports it took several months to heal.  She was on oral antibiotics.  She does not know if any cultures were done.  She was treated at the wound care center in [place].  It is now healed.  She is afraid to wear any closed shoe for fear it will get infected again.  On the right foot she has a bunion but that is not as painful.  She has the most pain on the right foot under the second metatarsal head where she has a large callus.  She trims the calluses intermittently.  She had some type of surgery in that web space by a podiatrist in the past for some type of nerve problem.  She does not have custom orthotics.  She reports her pain is 9-10 out of 10, worse with shoes and activity.  She is interested in surgery.  She has tried all types of wide shoes.    Current Outpatient Prescriptions on File Prior to Visit   Medication Sig Dispense Refill   • baclofen (LIORESAL) 10 MG tablet Take 1 tablet by mouth Daily. 30 tablet 5   • buPROPion XL (WELLBUTRIN XL) 150 MG 24 hr tablet Take 1 tablet by mouth 2 (Two) Times a Day. 180 tablet 3   • cetirizine (ZyrTEC) 10 MG tablet Take 10 mg by mouth daily.     • Chlorcyclizine-Pseudoephed (STAHIST AD) 25-60 MG tablet Take 1 tablet by mouth 2 (two) times a day.     •  Cholecalciferol (VITAMIN D3) 2000 UNITS tablet Take  by mouth.     • doxycycline (VIBRAMYCIN) 100 MG capsule Take 1 capsule by mouth 2 (Two) Times a Day. 20 capsule 0   • ESTRACE VAGINAL 0.1 MG/GM vaginal cream   1   • HYDROcodone-acetaminophen (NORCO)  MG per tablet Take 1 tablet by mouth Every 6 (Six) Hours As Needed for moderate pain (4-6). 120 tablet 0   • levothyroxine (SYNTHROID, LEVOTHROID) 25 MCG tablet Take 1 tablet by mouth Daily. 90 tablet 3   • lidocaine (XYLOCAINE) 5 % ointment APPLY affected area three times a day if needed  0   • loratadine (CLARITIN) 10 MG tablet Take 1 tablet by mouth Daily. 90 tablet 3   • Melatonin 10 MG capsule Take 2 capsules by mouth daily.     • methocarbamol (ROBAXIN) 500 MG tablet   0   • methscopolamine (PAMINE) 2.5 MG tablet Take 1 tablet by mouth 2 (Two) Times a Day As Needed (congestion). 120 tablet 5   • montelukast (SINGULAIR) 10 MG tablet Take 1 tablet by mouth Every Night. 90 tablet 3   • mupirocin (BACTROBAN) 2 % cream Apply  topically 3 (Three) Times a Day. 15 g 2   • naproxen (NAPROSYN) 500 MG tablet Take 1 tablet by mouth 2 (Two) Times a Day. 180 tablet 3   • olopatadine (PATADAY) 0.2 % solution ophthalmic solution 1 drop daily. 1 drop in each eye ophthalmic daily     • olopatadine (PATANASE) 0.6 % solution nasal solution by Each Nare route 2 (two) times a day.     • predniSONE (DELTASONE) 20 MG tablet 1 po BID X 5 days then 1 po QD X 5 days 15 tablet 1   • sertraline (ZOLOFT) 25 MG tablet Take 1 tablet by mouth Daily. 90 tablet 3   • sulfamethoxazole-trimethoprim (BACTRIM,SEPTRA) 200-40 MG/5ML suspension Take 10 mL by mouth 4 (Four) Times a Day. 180 mL 1   • terbinafine (LamiSIL) 250 MG tablet Take 250 mg by mouth daily.     • triamcinolone (KENALOG) 0.1 % cream Apply  topically 2 (Two) Times a Day. 28.4 g 3     No current facility-administered medications on file prior to visit.       Allergies   Allergen Reactions   • Penicillins       Past Medical  History:   Diagnosis Date   • Allergic rhinitis    • Benign paroxysmal positional vertigo    • Bursitis/tendonitis, shoulder    • Chronic nonsuppurative otitis media    • Depressive disorder    • Fatigue    • Headache, cervicogenic    • Hypothyroidism    • Insomnia    • Myalgia    • Nail fungus    • Nervousness    • OA (osteoarthritis) of shoulder    • CHIQUITA (obstructive sleep apnea)    • Osteoporosis    • Preventive measure    • Sciatica of right side    • TSH (thyroid-stimulating hormone deficiency)    • Vitamin D deficiency      Past Surgical History:   Procedure Laterality Date   • FOOT SURGERY Right     pinched nerve   • NOSE SURGERY  1985     Family History   Problem Relation Age of Onset   • Stroke Mother    • Cancer Father    • Breast cancer Sister 73      Social History     Social History   • Marital status:      Spouse name: N/A   • Number of children: N/A   • Years of education: N/A     Occupational History   • Not on file.     Social History Main Topics   • Smoking status: Never Smoker   • Smokeless tobacco: Never Used   • Alcohol use Yes      Comment: social   • Drug use: No   • Sexual activity: Yes     Other Topics Concern   • Not on file     Social History Narrative   • No narrative on file        Review of Systems   Constitutional: Negative.    HENT: Positive for dental problem.    Eyes: Negative.    Respiratory: Negative.    Cardiovascular: Negative.    Gastrointestinal: Negative.    Endocrine: Negative.    Genitourinary: Negative.    Musculoskeletal: Positive for back pain, neck pain and neck stiffness.   Skin: Negative.    Allergic/Immunologic: Positive for environmental allergies.   Neurological: Negative.    Hematological: Negative.    Psychiatric/Behavioral: Negative.        The following portions of the patient's history were reviewed and updated as appropriate: allergies, current medications, past family history, past medical history, past social history, past surgical history and  "problem list.    Physical Exam:   Pulse 80   Ht 174 cm (68.5\")   Wt 66.2 kg (146 lb)   SpO2 98%   BMI 21.88 kg/m²   GENERAL: Body habitus: normal weight for height    Lower extremity edema: Left: none; Right: none    Varicose veins:  Left: mild; Right: mild    Gait: normal     Mental Status:  awake and alert; oriented to person, place, and time    Voice:  clear  SKIN:  Normal and warm and dry    Hair Growth:  Right:normal; Left:  normal  NAILS: Toenails: normal  HEENT: Head: Normocephalic, atraumatic,  without obvious abnormality.  eye: normal external eye, no icterus  ears: normal external ears  nose: normal external nose  pharynx: dental hygiene adequate  PULM:  Repiratory effort normal  CV:  Dorsalis Pedis:  Right: 2+; Left:2+    Posterior Tibial: Right:2+; Left:2+    Capillary Refill:  Brisk  MSK:  Hand:right handed and moderate arthritis      Tibia:  Right:  non tender; Left:  non tender      Ankle:  Right: non tender, ROM  normal and symmetric and motor function  normal; Left:  non tender, ROM  normal and symmetric and motor function  normal      Foot:  Right:  Varus and hallux valgus metatarsus primus varus, with moderate metatarsus adductus.  She is mildly tender over the bunion today, positive pain with the grind test.  Large callus under the second metatarsal head that is tender to palpation.  Moderate second hammertoe.  Moderate hammertoes 3 and 4 but nontender.   healed dorsal incision second metatarsal phalangeal joint; Left:  Severe hallux valgus metatarsus primus varus with pronation of the great toe.  Positive pain with the grind test.  Significantly tender over the bunion.  No open skin today.  Thickened bursa.  Range of motion shows only 20° arc of motion.  Milder second, third, fourth hammertoes that are nontender.      NEURO: Heel Walking:  Right:  normal; Left:  normal    Toe Walking:  Right:  limited ability, painful; Left:  limited ability, painful     [place]-[employer] 5.07 monofilament " test: normal    Lower extremity sensation: intact     Reflexes:  Biceps:  Right:  1+; Left:  1+           Quads:  Right:  2+; Left:  2+           Ankle:  Right:  1+; Left:  1+      Calf Atrophy:none    Motor Function: all 5/5         Medical Decision Making    Data Review:   ordered and reviewed x-rays today    Assessment and Plan/ Diagnosis/Treatment options:   1.Acquired metatarsus varus of left foot  She has both significant hallux valgus metatarsus primus varus and metatarsus adductus bilaterally.  I got a second standing film on the left to make sure that it was not just positional, she definitely has metatarsus adductus.  She has narrowing of the first metatarsal phalangeal joint and significant pronation.  She has a 40° hallux valgus angle and 15° intermetatarsal angle.  No signs of instability or laxity.  I explained bunions in detail to her I explained the widening of the angle and gino a picture on her foot.  I explained that you cannot approach patient's with exactly the same surgery in every case.  You have to tailor the procedure to the factors that come with each individual patient.  I explained that a bunion is not a growth on the foot.  I explained the standard foot and ankle procedures that I use for bunions. I explained that in general, bunion surgery helps pain.  The [employer] did a reasearch study a number of years ago and it showed that at ONE YEAR after surgery, 90% of patients were happy with the pain relief and the results.       In her case, if it is painful enough for surgery, I would do a fusion of the first metatarsal phalangeal joint.  I explained that in great detail.  I explained patients cannot run after this, but I would expect her to be able to walk and do reasonable hiking in a stiff soled shoe.  I explained I never talk patients into surgery.  Only they can decide if it hurts enough to proceed.  She feels that she has enough pain for surgery on  "the left.  I explained the MTPJ fusion surgery.  I explained the stiffness in the toe, patients usually walk well after the surgery but they cannot go on tip-toe, crouch, wear heels, and they go down stairs flat-footed.  At most, they will be able to wear a 1 inch wedge heel.  The goal of the surgery is a stiff straight toe with less pain.  Most patients report about 80% reduction in pain.      I explained the permanent plate and screws, and the temporary pin and how I remove it in the office.   I explained that all foot and ankle surgery swells far longer than any other surgery because it is below the heart.     I explained the post-op regimen: 6-8 weeks non-weight bearing in a tall boot, then 6-8 weeks walking in a boot, with swelling it may be 4-5 months to get a shoe back on.  I explained it really takes a full year to see the end results of the surgery.    The risks were discussed including but not limited to: death, infection, neuro-vascular damage, stroke, heart attacks, blood clots, amputation, chronic pain, mal-union, non-union, hardware failure, deformity, need for further surgery, etc. she does have somewhat increased risk of infection given the history of an open wound, we will use vancomycin prophylactically.  Questions were asked and answered in detail.      2. Acquired metatarsus varus of right foot  She has a similar bunion and metatarsus adductus on the right, but is most painful under the second metatarsal head where she has a large callus.  She has a moderate second hammertoe. I explained that our feet change with time, we develop different pressure points.  I explained that calluses are a normal skin response to pressure.  They are not like a tumor, they cannot be \"cut out\".  I explained they would go away if patients stop ambulating (for example they become paraplegic).  I explained that the underlying bone is normal, it is just receiving more pressure than previously. Therefore, surgery does " not work for this problem (unless there is a specific bone abnormality).  There  are two things that help the pain significantly, and help manage the callusing.  The first is to use a Ped Egg or pumice stone daily, and I showed them this.  Keeping the calluses (and I explained that corns are the same thing but occur on toes) as thin as possible reduces pain.  The second treatment is a custom orthotic to change the pressure on the foot to decrease the callusing.  She would like to try this at present.  I gave her prescription for orthotics.        3. Contracture of joint of right foot  As above

## 2018-08-02 ENCOUNTER — TRANSCRIBE ORDERS (OUTPATIENT)
Dept: ADMINISTRATIVE | Facility: HOSPITAL | Age: 68
End: 2018-08-02

## 2018-08-02 DIAGNOSIS — Z12.31 VISIT FOR SCREENING MAMMOGRAM: Primary | ICD-10-CM

## 2018-08-16 ENCOUNTER — OFFICE VISIT (OUTPATIENT)
Dept: FAMILY MEDICINE CLINIC | Facility: CLINIC | Age: 68
End: 2018-08-16

## 2018-08-16 VITALS
WEIGHT: 147 LBS | HEART RATE: 64 BPM | DIASTOLIC BLOOD PRESSURE: 68 MMHG | HEIGHT: 69 IN | SYSTOLIC BLOOD PRESSURE: 126 MMHG | OXYGEN SATURATION: 99 % | BODY MASS INDEX: 21.77 KG/M2 | TEMPERATURE: 98.2 F

## 2018-08-16 DIAGNOSIS — L30.9 DERMATITIS: Primary | ICD-10-CM

## 2018-08-16 DIAGNOSIS — J30.9 CHRONIC ALLERGIC RHINITIS, UNSPECIFIED SEASONALITY, UNSPECIFIED TRIGGER: ICD-10-CM

## 2018-08-16 PROCEDURE — 99213 OFFICE O/P EST LOW 20 MIN: CPT | Performed by: PHYSICIAN ASSISTANT

## 2018-08-16 PROCEDURE — 96372 THER/PROPH/DIAG INJ SC/IM: CPT | Performed by: PHYSICIAN ASSISTANT

## 2018-08-16 RX ORDER — TRIAMCINOLONE ACETONIDE 1 MG/G
CREAM TOPICAL 3 TIMES DAILY
Qty: 45 G | Refills: 1 | Status: SHIPPED | OUTPATIENT
Start: 2018-08-16 | End: 2021-02-17

## 2018-08-16 RX ORDER — AZITHROMYCIN 250 MG/1
TABLET, FILM COATED ORAL
Qty: 6 TABLET | Refills: 0 | Status: SHIPPED | OUTPATIENT
Start: 2018-08-16 | End: 2018-10-26

## 2018-08-16 RX ORDER — TRIAMCINOLONE ACETONIDE 40 MG/ML
40 INJECTION, SUSPENSION INTRA-ARTICULAR; INTRAMUSCULAR ONCE
Status: COMPLETED | OUTPATIENT
Start: 2018-08-16 | End: 2018-08-16

## 2018-08-16 RX ADMIN — TRIAMCINOLONE ACETONIDE 40 MG: 40 INJECTION, SUSPENSION INTRA-ARTICULAR; INTRAMUSCULAR at 11:31

## 2018-08-16 NOTE — PROGRESS NOTES
Subjective   Marci Snyder is a 67 y.o. female  Itching (Itching on left arm x2 weeks )      History of Present Illness  Patient comes in today with concerns of persistent itching over her left upper arm for the past 2 weeks.  She states she has a lot of seasonal allergies but does not recall any specific allergen that is irritated skin.  She's been scratching it to the point of causing bumps to occur on her skin.  No other areas on her skin are affected just her left upper extremity.  The following portions of the patient's history were reviewed and updated as appropriate: allergies, current medications, past social history and problem list    Review of Systems   Constitutional: Negative for chills, fatigue and fever.   HENT: Positive for congestion and postnasal drip. Negative for ear pain, hearing loss, rhinorrhea, sinus pressure, sneezing, sore throat and trouble swallowing.    Eyes: Negative for itching.   Respiratory: Negative for cough.    Cardiovascular: Negative for chest pain.   Musculoskeletal: Negative for arthralgias.   Skin: Positive for color change and rash. Negative for pallor and wound.   Neurological: Negative for headaches.       Objective     Vitals:    08/16/18 1002   BP: 126/68   Pulse: 64   Temp: 98.2 °F (36.8 °C)   SpO2: 99%       Physical Exam   Constitutional: She appears well-developed and well-nourished. No distress.   HENT:   Head: Normocephalic and atraumatic.   Eyes: Conjunctivae are normal.   Cardiovascular: Normal rate and regular rhythm.    Skin: Skin is warm and dry. Rash noted. She is not diaphoretic. There is erythema. No pallor.   Areas of excoriation with 3-4 maculopapular erythematous areas scattered over left upper extremity, no vesicles, slight crusting noted over excoriated areas.  No pustules.   Nursing note and vitals reviewed.      Assessment/Plan     Diagnoses and all orders for this visit:    Dermatitis  -     triamcinolone acetonide (KENALOG-40) injection 40  mg; Inject 1 mL into the appropriate muscle as directed by prescriber 1 (One) Time.    Chronic allergic rhinitis, unspecified seasonality, unspecified trigger    Other orders  -     triamcinolone (KENALOG) 0.1 % cream; Apply  topically to the appropriate area as directed 3 (Three) Times a Day. To arm for itching  -     azithromycin (ZITHROMAX Z-NALLELY) 250 MG tablet; Take 2 tablets the first day, then 1 tablet daily for 4 days.

## 2018-08-24 ENCOUNTER — APPOINTMENT (OUTPATIENT)
Dept: MAMMOGRAPHY | Facility: HOSPITAL | Age: 68
End: 2018-08-24

## 2018-09-14 ENCOUNTER — HOSPITAL ENCOUNTER (OUTPATIENT)
Dept: MAMMOGRAPHY | Facility: HOSPITAL | Age: 68
Discharge: HOME | End: 2018-09-14
Admitting: FAMILY MEDICINE

## 2018-09-14 DIAGNOSIS — Z12.31 VISIT FOR SCREENING MAMMOGRAM: ICD-10-CM

## 2018-09-14 PROCEDURE — 77067 SCR MAMMO BI INCL CAD: CPT

## 2018-09-14 PROCEDURE — 77063 BREAST TOMOSYNTHESIS BI: CPT | Performed by: RADIOLOGY

## 2018-09-14 PROCEDURE — 77063 BREAST TOMOSYNTHESIS BI: CPT

## 2018-09-14 PROCEDURE — 77067 SCR MAMMO BI INCL CAD: CPT | Performed by: RADIOLOGY

## 2018-10-26 ENCOUNTER — OFFICE VISIT (OUTPATIENT)
Dept: FAMILY MEDICINE CLINIC | Facility: CLINIC | Age: 68
End: 2018-10-26

## 2018-10-26 VITALS
WEIGHT: 143 LBS | BODY MASS INDEX: 21.18 KG/M2 | HEIGHT: 69 IN | SYSTOLIC BLOOD PRESSURE: 110 MMHG | TEMPERATURE: 98 F | DIASTOLIC BLOOD PRESSURE: 70 MMHG

## 2018-10-26 DIAGNOSIS — M19.012 PRIMARY OSTEOARTHRITIS OF LEFT SHOULDER: ICD-10-CM

## 2018-10-26 DIAGNOSIS — J92.9 PLEURAL THICKENING: Primary | ICD-10-CM

## 2018-10-26 PROCEDURE — 99213 OFFICE O/P EST LOW 20 MIN: CPT | Performed by: FAMILY MEDICINE

## 2018-10-26 PROCEDURE — 20610 DRAIN/INJ JOINT/BURSA W/O US: CPT | Performed by: FAMILY MEDICINE

## 2018-10-26 RX ORDER — INFLUENZA A VIRUS A/MICHIGAN/45/2015 X-275 (H1N1) ANTIGEN (FORMALDEHYDE INACTIVATED), INFLUENZA A VIRUS A/SINGAPORE/INFIMH-16-0019/2016 IVR-186 (H3N2) ANTIGEN (FORMALDEHYDE INACTIVATED), AND INFLUENZA B VIRUS B/MARYLAND/15/2016 BX-69A (A B/COLORADO/6/2017-LIKE VIRUS) ANTIGEN (FORMALDEHYDE INACTIVATED) 60; 60; 60 UG/.5ML; UG/.5ML; UG/.5ML
INJECTION, SUSPENSION INTRAMUSCULAR
Refills: 0 | COMMUNITY
Start: 2018-09-07 | End: 2022-10-12

## 2018-10-26 RX ADMIN — METHYLPREDNISOLONE ACETATE 40 MG: 40 INJECTION, SUSPENSION INTRA-ARTICULAR; INTRALESIONAL; INTRAMUSCULAR; SOFT TISSUE at 16:39
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
## 2018-11-01 ENCOUNTER — TELEPHONE (OUTPATIENT)
Dept: FAMILY MEDICINE CLINIC | Facility: CLINIC | Age: 68
End: 2018-11-01

## 2018-11-01 ENCOUNTER — TRANSCRIBE ORDERS (OUTPATIENT)
Dept: FAMILY MEDICINE CLINIC | Facility: CLINIC | Age: 68
End: 2018-11-01

## 2018-11-01 DIAGNOSIS — J92.9 PLEURAL THICKENING: Primary | ICD-10-CM

## 2018-11-01 NOTE — TELEPHONE ENCOUNTER
----- Message from Solange Martinez sent at 11/1/2018  1:34 PM EDT -----  Patient called wanting a referral to Dr.John Lane Patterson at [facility], [place], operated by [facility] for her dx of pleural thickening that was dx earlier this month which a CT revealed right apical pleural thickening felt to be postinflammatory and recommended a six-month follow-up. I entered a order for pulmonary/pending for Dr.Van Robertson signature..  Thanks,  Solange..

## 2018-11-12 RX ORDER — METHYLPREDNISOLONE ACETATE 40 MG/ML
40 INJECTION, SUSPENSION INTRA-ARTICULAR; INTRALESIONAL; INTRAMUSCULAR; SOFT TISSUE ONCE
Status: COMPLETED | OUTPATIENT
Start: 2018-11-12 | End: 2018-10-26

## 2018-11-26 ENCOUNTER — APPOINTMENT (OUTPATIENT)
Dept: PREADMISSION TESTING | Facility: HOSPITAL | Age: 68
End: 2018-11-26

## 2018-11-26 DIAGNOSIS — F32.A DEPRESSIVE DISORDER: ICD-10-CM

## 2018-11-26 RX ORDER — BUPROPION HYDROCHLORIDE 150 MG/1
TABLET ORAL
Qty: 180 TABLET | Refills: 3 | Status: SHIPPED | OUTPATIENT
Start: 2018-11-26 | End: 2019-11-08 | Stop reason: SDUPTHER

## 2018-12-17 ENCOUNTER — OFFICE VISIT (OUTPATIENT)
Dept: ORTHOPEDIC SURGERY | Facility: CLINIC | Age: 68
End: 2018-12-17

## 2018-12-17 VITALS — OXYGEN SATURATION: 98 % | HEART RATE: 91 BPM | WEIGHT: 140 LBS | HEIGHT: 69 IN | BODY MASS INDEX: 20.73 KG/M2

## 2018-12-17 DIAGNOSIS — M24.574 CONTRACTURE OF JOINT OF RIGHT FOOT: ICD-10-CM

## 2018-12-17 DIAGNOSIS — M21.6X2 ACQUIRED METATARSUS VARUS OF LEFT FOOT: Primary | ICD-10-CM

## 2018-12-17 PROCEDURE — 99212 OFFICE O/P EST SF 10 MIN: CPT | Performed by: ORTHOPAEDIC SURGERY

## 2018-12-17 NOTE — PROGRESS NOTES
ESTABLISHED PATIENT    Patient: Marci Snyder  : 1950    Primary Care Provider: Jimbo Vuong MD    Requesting Provider: As above    Follow-up (5 months - Acquired metatarsus varus of Bilateral foot & Contracture of joint of right foot)      History    Chief Complaint: Left foot pain    History of Present Illness: The patient returns unexpectedly.  She had scheduled surgery for next month.  She is here with a new problem.  The skin over the left bunion has been getting worse.  She is been worried that she is going to develop an ulcer on the left bunion.  She has not had any open skin, she's been wearing sandals    Current Outpatient Medications on File Prior to Visit   Medication Sig Dispense Refill   • baclofen (LIORESAL) 10 MG tablet Take 1 tablet by mouth Daily. 30 tablet 5   • buPROPion XL (WELLBUTRIN XL) 150 MG 24 hr tablet take 1 tablet by mouth twice a day 180 tablet 3   • cetirizine (ZyrTEC) 10 MG tablet Take 10 mg by mouth daily.     • Chlorcyclizine-Pseudoephed (STAHIST AD) 25-60 MG tablet Take 1 tablet by mouth 2 (two) times a day.     • Cholecalciferol (VITAMIN D3) 2000 UNITS tablet Take  by mouth.     • ESTRACE VAGINAL 0.1 MG/GM vaginal cream   1   • FLUZONE HIGH-DOSE 0.5 ML suspension prefilled syringe injection inject 0.5 milliliter intramuscularly  0   • HYDROcodone-acetaminophen (NORCO)  MG per tablet Take 1 tablet by mouth Every 6 (Six) Hours As Needed for moderate pain (4-6). 120 tablet 0   • levothyroxine (SYNTHROID, LEVOTHROID) 25 MCG tablet Take 1 tablet by mouth Daily. 90 tablet 3   • lidocaine (XYLOCAINE) 5 % ointment APPLY affected area three times a day if needed  0   • loratadine (CLARITIN) 10 MG tablet Take 1 tablet by mouth Daily. 90 tablet 3   • Melatonin 10 MG capsule Take 2 capsules by mouth daily.     • methocarbamol (ROBAXIN) 500 MG tablet   0   • methscopolamine (PAMINE) 2.5 MG tablet Take 1 tablet by mouth 2 (Two) Times a Day As Needed  (congestion). 120 tablet 5   • montelukast (SINGULAIR) 10 MG tablet Take 1 tablet by mouth Every Night. 90 tablet 3   • mupirocin (BACTROBAN) 2 % cream Apply  topically 3 (Three) Times a Day. 15 g 2   • naproxen (NAPROSYN) 500 MG tablet Take 1 tablet by mouth 2 (Two) Times a Day. 180 tablet 3   • olopatadine (PATADAY) 0.2 % solution ophthalmic solution 1 drop daily. 1 drop in each eye ophthalmic daily     • olopatadine (PATANASE) 0.6 % solution nasal solution by Each Nare route 2 (two) times a day.     • sertraline (ZOLOFT) 25 MG tablet Take 1 tablet by mouth Daily. 90 tablet 3   • terbinafine (LamiSIL) 250 MG tablet Take 250 mg by mouth daily.     • triamcinolone (KENALOG) 0.1 % cream Apply  topically to the appropriate area as directed 3 (Three) Times a Day. To arm for itching 45 g 1     No current facility-administered medications on file prior to visit.       Allergies   Allergen Reactions   • Penicillins       Past Medical History:   Diagnosis Date   • Allergic rhinitis    • Benign paroxysmal positional vertigo    • Bursitis/tendonitis, shoulder    • Chronic nonsuppurative otitis media    • Depressive disorder    • Fatigue    • Headache, cervicogenic    • Hypothyroidism    • Insomnia    • Myalgia    • Nail fungus    • Nervousness    • OA (osteoarthritis) of shoulder    • CHIQUITA (obstructive sleep apnea)    • Osteoporosis    • Preventive measure    • Sciatica of right side    • TSH (thyroid-stimulating hormone deficiency)    • Vitamin D deficiency      Past Surgical History:   Procedure Laterality Date   • FOOT SURGERY Right     pinched nerve   • NOSE SURGERY  1985     Family History   Problem Relation Age of Onset   • Stroke Mother    • Cancer Father    • Breast cancer Sister 73   • Breast cancer Paternal Aunt         DX AGE UNKNOWN   • Breast cancer Cousin         SEVERAL COUSINS - DX AGES UNKNOWN   • Breast cancer Paternal Aunt         DX AGE UNKNOWN   • Ovarian cancer Neg Hx       Social History  "    Socioeconomic History   • Marital status:      Spouse name: Not on file   • Number of children: Not on file   • Years of education: Not on file   • Highest education level: Not on file   Social Needs   • Financial resource strain: Not on file   • Food insecurity - worry: Not on file   • Food insecurity - inability: Not on file   • Transportation needs - medical: Not on file   • Transportation needs - non-medical: Not on file   Occupational History   • Not on file   Tobacco Use   • Smoking status: Never Smoker   • Smokeless tobacco: Never Used   Substance and Sexual Activity   • Alcohol use: Yes     Comment: social   • Drug use: No   • Sexual activity: Yes   Other Topics Concern   • Not on file   Social History Narrative   • Not on file        Review of Systems   Constitutional: Negative.    HENT: Negative.    Eyes: Negative.    Respiratory: Negative.    Cardiovascular: Negative.    Gastrointestinal: Negative.    Endocrine: Negative.    Genitourinary: Negative.    Musculoskeletal: Positive for arthralgias and joint swelling.   Skin: Negative.    Allergic/Immunologic: Negative.    Neurological: Negative.    Hematological: Negative.    Psychiatric/Behavioral: Negative.        The following portions of the patient's history were reviewed and updated as appropriate: allergies, current medications, past family history, past medical history, past social history, past surgical history and problem list.    Physical Exam:   Pulse 91   Ht 174 cm (68.5\")   Wt 63.5 kg (140 lb)   LMP  (LMP Unknown)   SpO2 98%   BMI 20.97 kg/m²   GENERAL: Body habitus: normal weight for height    Lower extremity edema: Left: trace; Right: trace    Gait: normal     Mental Status:  awake and alert; oriented to person, place, and time  MSK:      Foot:  Right:  non tender; Left:  Tender over the significant bunion, is evidence of shoe pressure, but the skin is intact, there is no open wound, no signs of infection    NEURO Sensation:  " intact     Medical Decision Making    Data Review:   none    Assessment/Plan/Diagnosis/Treatment Options:   1. Acquired metatarsus varus of left foot  I reassured her that there is no contraindication for surgery right now.  The skin is intact, it certainly is at risk, and she should stay in the sandal.  She asked about a boot to wear before surgery, but I think the open sandal is actually better.  She understands how to protect this can keep it healed.  I will see her again for surgery    2. Contracture of joint of right foot  As above

## 2019-01-04 ENCOUNTER — APPOINTMENT (OUTPATIENT)
Dept: PREADMISSION TESTING | Facility: HOSPITAL | Age: 69
End: 2019-01-04

## 2019-01-04 DIAGNOSIS — M21.6X1 ACQUIRED METATARSUS VARUS OF RIGHT FOOT: ICD-10-CM

## 2019-01-04 DIAGNOSIS — M24.574 CONTRACTURE OF JOINT OF RIGHT FOOT: ICD-10-CM

## 2019-01-04 DIAGNOSIS — M21.6X2 ACQUIRED METATARSUS VARUS OF LEFT FOOT: ICD-10-CM

## 2019-01-04 DIAGNOSIS — Z79.899 POLYPHARMACY: ICD-10-CM

## 2019-01-04 LAB
ANION GAP SERPL CALCULATED.3IONS-SCNC: 7 MMOL/L (ref 3–11)
BASOPHILS # BLD AUTO: 0.03 10*3/MM3 (ref 0–0.2)
BASOPHILS NFR BLD AUTO: 0.5 % (ref 0–1)
BUN BLD-MCNC: 25 MG/DL (ref 9–23)
BUN/CREAT SERPL: 27.8 (ref 7–25)
CALCIUM SPEC-SCNC: 9.4 MG/DL (ref 8.7–10.4)
CHLORIDE SERPL-SCNC: 101 MMOL/L (ref 99–109)
CO2 SERPL-SCNC: 32 MMOL/L (ref 20–31)
CREAT BLD-MCNC: 0.9 MG/DL (ref 0.6–1.3)
DEPRECATED RDW RBC AUTO: 43.5 FL (ref 37–54)
EOSINOPHIL # BLD AUTO: 0.1 10*3/MM3 (ref 0–0.3)
EOSINOPHIL NFR BLD AUTO: 1.7 % (ref 0–3)
ERYTHROCYTE [DISTWIDTH] IN BLOOD BY AUTOMATED COUNT: 12.6 % (ref 11.3–14.5)
GFR SERPL CREATININE-BSD FRML MDRD: 62 ML/MIN/1.73
GLUCOSE BLD-MCNC: 100 MG/DL (ref 70–100)
HBA1C MFR BLD: 5.4 % (ref 4.8–5.6)
HCT VFR BLD AUTO: 38.5 % (ref 34.5–44)
HGB BLD-MCNC: 12.2 G/DL (ref 11.5–15.5)
IMM GRANULOCYTES # BLD AUTO: 0.01 10*3/MM3 (ref 0–0.03)
IMM GRANULOCYTES NFR BLD AUTO: 0.2 % (ref 0–0.6)
LYMPHOCYTES # BLD AUTO: 1.75 10*3/MM3 (ref 0.6–4.8)
LYMPHOCYTES NFR BLD AUTO: 30.1 % (ref 24–44)
MCH RBC QN AUTO: 30.1 PG (ref 27–31)
MCHC RBC AUTO-ENTMCNC: 31.7 G/DL (ref 32–36)
MCV RBC AUTO: 95.1 FL (ref 80–99)
MONOCYTES # BLD AUTO: 0.7 10*3/MM3 (ref 0–1)
MONOCYTES NFR BLD AUTO: 12 % (ref 0–12)
NEUTROPHILS # BLD AUTO: 3.23 10*3/MM3 (ref 1.5–8.3)
NEUTROPHILS NFR BLD AUTO: 55.7 % (ref 41–71)
PLATELET # BLD AUTO: 372 10*3/MM3 (ref 150–450)
PMV BLD AUTO: 9.9 FL (ref 6–12)
POTASSIUM BLD-SCNC: 3.8 MMOL/L (ref 3.5–5.5)
RBC # BLD AUTO: 4.05 10*6/MM3 (ref 3.89–5.14)
SODIUM BLD-SCNC: 140 MMOL/L (ref 132–146)
WBC NRBC COR # BLD: 5.81 10*3/MM3 (ref 3.5–10.8)

## 2019-01-04 PROCEDURE — 36415 COLL VENOUS BLD VENIPUNCTURE: CPT

## 2019-01-04 PROCEDURE — 93005 ELECTROCARDIOGRAM TRACING: CPT

## 2019-01-04 PROCEDURE — 85025 COMPLETE CBC W/AUTO DIFF WBC: CPT | Performed by: ORTHOPAEDIC SURGERY

## 2019-01-04 PROCEDURE — 93010 ELECTROCARDIOGRAM REPORT: CPT | Performed by: INTERNAL MEDICINE

## 2019-01-04 PROCEDURE — 80048 BASIC METABOLIC PNL TOTAL CA: CPT | Performed by: ORTHOPAEDIC SURGERY

## 2019-01-04 PROCEDURE — 83036 HEMOGLOBIN GLYCOSYLATED A1C: CPT | Performed by: ORTHOPAEDIC SURGERY

## 2019-01-16 DIAGNOSIS — E03.9 ACQUIRED HYPOTHYROIDISM: ICD-10-CM

## 2019-01-16 DIAGNOSIS — M19.012 PRIMARY OSTEOARTHRITIS OF LEFT SHOULDER: ICD-10-CM

## 2019-01-16 RX ORDER — HYDROCODONE BITARTRATE AND ACETAMINOPHEN 7.5; 325 MG/1; MG/1
1-2 TABLET ORAL EVERY 6 HOURS PRN
Qty: 60 TABLET | Refills: 0 | Status: SHIPPED | OUTPATIENT
Start: 2019-01-16 | End: 2019-04-10

## 2019-01-16 RX ORDER — NAPROXEN 500 MG/1
TABLET ORAL
Qty: 180 TABLET | Refills: 3 | Status: CANCELLED | OUTPATIENT
Start: 2019-01-16

## 2019-01-16 RX ORDER — OXYCODONE HYDROCHLORIDE AND ACETAMINOPHEN 5; 325 MG/1; MG/1
1-2 TABLET ORAL EVERY 6 HOURS PRN
Qty: 60 TABLET | Refills: 0 | Status: SHIPPED | OUTPATIENT
Start: 2019-01-16 | End: 2019-02-04

## 2019-01-16 RX ORDER — ONDANSETRON 4 MG/1
4 TABLET, FILM COATED ORAL EVERY 6 HOURS PRN
Qty: 30 TABLET | Refills: 0 | Status: SHIPPED | OUTPATIENT
Start: 2019-01-16 | End: 2019-02-04

## 2019-01-16 RX ORDER — LEVOTHYROXINE SODIUM 0.03 MG/1
TABLET ORAL
Qty: 90 TABLET | Refills: 3 | Status: CANCELLED | OUTPATIENT
Start: 2019-01-16

## 2019-01-17 ENCOUNTER — OUTSIDE FACILITY SERVICE (OUTPATIENT)
Dept: ORTHOPEDIC SURGERY | Facility: CLINIC | Age: 69
End: 2019-01-17

## 2019-01-17 ENCOUNTER — TELEPHONE (OUTPATIENT)
Dept: FAMILY MEDICINE CLINIC | Facility: CLINIC | Age: 69
End: 2019-01-17

## 2019-01-17 DIAGNOSIS — E03.9 ACQUIRED HYPOTHYROIDISM: ICD-10-CM

## 2019-01-17 DIAGNOSIS — M19.012 PRIMARY OSTEOARTHRITIS OF LEFT SHOULDER: ICD-10-CM

## 2019-01-17 PROCEDURE — 28750 FUSION OF BIG TOE JOINT: CPT | Performed by: ORTHOPAEDIC SURGERY

## 2019-01-17 RX ORDER — LEVOTHYROXINE SODIUM 0.03 MG/1
25 TABLET ORAL DAILY
Qty: 90 TABLET | Refills: 3 | Status: SHIPPED | OUTPATIENT
Start: 2019-01-17 | End: 2020-01-31 | Stop reason: SDUPTHER

## 2019-01-17 RX ORDER — SERTRALINE HYDROCHLORIDE 25 MG/1
25 TABLET, FILM COATED ORAL DAILY
Qty: 90 TABLET | Refills: 3 | Status: SHIPPED | OUTPATIENT
Start: 2019-01-17 | End: 2020-01-31 | Stop reason: SDUPTHER

## 2019-01-17 RX ORDER — NAPROXEN 500 MG/1
500 TABLET ORAL 2 TIMES DAILY WITH MEALS
Qty: 180 TABLET | Refills: 3 | Status: SHIPPED | OUTPATIENT
Start: 2019-01-17 | End: 2020-05-06 | Stop reason: SDUPTHER

## 2019-01-18 ENCOUNTER — TELEPHONE (OUTPATIENT)
Dept: ORTHOPEDIC SURGERY | Facility: CLINIC | Age: 69
End: 2019-01-18

## 2019-01-18 NOTE — TELEPHONE ENCOUNTER
She can change dressing in 4 days, clean incision with peroxide, apply gauze and ace wrap, can change every 4-5 days- it should be on the post op instructions that recovery room should have given them

## 2019-01-18 NOTE — TELEPHONE ENCOUNTER
PATIENT HAD SURGERY YESTERDAY WITH DR. DEAN, SHE CALLED WITH QUESTIONS REGARDING HER BANDAGING AND CLEANING.  COULD SOMEBODY FROM CLINICAL PLEASE CALL AND HELP HER?  HER NUMBER IS [phone]  THANK YOU!!

## 2019-01-31 RX ORDER — SERTRALINE HYDROCHLORIDE 25 MG/1
TABLET, FILM COATED ORAL
Qty: 90 TABLET | Refills: 9 | Status: SHIPPED | OUTPATIENT
Start: 2019-01-31 | End: 2020-01-31 | Stop reason: SDUPTHER

## 2019-02-04 ENCOUNTER — OFFICE VISIT (OUTPATIENT)
Dept: ORTHOPEDIC SURGERY | Facility: CLINIC | Age: 69
End: 2019-02-04

## 2019-02-04 DIAGNOSIS — Z47.89 AFTERCARE FOLLOWING SURGERY OF THE MUSCULOSKELETAL SYSTEM: Primary | ICD-10-CM

## 2019-02-04 PROCEDURE — 99024 POSTOP FOLLOW-UP VISIT: CPT | Performed by: ORTHOPAEDIC SURGERY

## 2019-02-04 NOTE — PROGRESS NOTES
Post-op (3 weeks status post Left 1st MPJ fusion 01/17/19)      Marci Snyder is 3 weeks status post left 1st MTPJ fusion, 1/17/19. She reports no fever, chills.  She reports pain is well controlled.  They have been taking aspirin for DVT prophylaxis.  They have been NWB in boot.      Past Surgical History:   Procedure Laterality Date   • FOOT SURGERY Right     pinched nerve   • FOOT SURGERY Left 01/17/2019    1st MPJ fusion - Dr. Gwendolyn Del Rosario ; [facility]    • NOSE SURGERY  1985       LMP  (LMP Unknown)         No erythema, no drainage, no sign of infection, normal post op swelling. Good alignment left foot, pin site clean    ordered and reviewed x-rays today    Assessment and Plan:   1. Aftercare following surgery of the musculoskeletal system  Doing well, we removed sutures.  shemust keep it dry and stay non-wt bearing.  She will see MS Silvano in 3 weeks for xray, if it looks good pin may be pulled and she may walk in boot, see me in 6-8 weeks for xray  - XR Foot 2 View Left

## 2019-02-26 ENCOUNTER — OFFICE VISIT (OUTPATIENT)
Dept: ORTHOPEDIC SURGERY | Facility: CLINIC | Age: 69
End: 2019-02-26

## 2019-02-26 ENCOUNTER — TELEPHONE (OUTPATIENT)
Dept: ORTHOPEDIC SURGERY | Facility: CLINIC | Age: 69
End: 2019-02-26

## 2019-02-26 DIAGNOSIS — Z09 SURGERY FOLLOW-UP: Primary | ICD-10-CM

## 2019-02-26 PROCEDURE — 99024 POSTOP FOLLOW-UP VISIT: CPT | Performed by: PHYSICIAN ASSISTANT

## 2019-02-26 NOTE — PROGRESS NOTES
[facility]    Subjective     Patient: Marci Snyder  : 1950    Primary Care Provider: Jimbo Vuong MD    Requesting Provider: As above    Post-op (6 weeks status post Left 1st MPJ fusion 19))      History      History of Present Illness: Patient presents today 6 weeks status post left first MTP joint fusion with Dr. Huff.  She has been nonweightbearing as instructed using a knee walker.  She reports no pain.    Current Outpatient Medications on File Prior to Visit   Medication Sig Dispense Refill   • baclofen (LIORESAL) 10 MG tablet Take 1 tablet by mouth Daily. 30 tablet 5   • buPROPion XL (WELLBUTRIN XL) 150 MG 24 hr tablet take 1 tablet by mouth twice a day 180 tablet 3   • cetirizine (ZyrTEC) 10 MG tablet Take 10 mg by mouth daily.     • Chlorcyclizine-Pseudoephed (STAHIST AD) 25-60 MG tablet Take 1 tablet by mouth 2 (two) times a day.     • Cholecalciferol (VITAMIN D3) 2000 UNITS tablet Take  by mouth.     • ESTRACE VAGINAL 0.1 MG/GM vaginal cream   1   • FLUZONE HIGH-DOSE 0.5 ML suspension prefilled syringe injection inject 0.5 milliliter intramuscularly  0   • HYDROcodone-acetaminophen (NORCO)  MG per tablet Take 1 tablet by mouth Every 6 (Six) Hours As Needed for moderate pain (4-6). 120 tablet 0   • HYDROcodone-acetaminophen (NORCO) 7.5-325 MG per tablet Take 1-2 tablets by mouth Every 6 (Six) Hours As Needed for Moderate Pain 60 tablet 0   • levothyroxine (SYNTHROID, LEVOTHROID) 25 MCG tablet Take 1 tablet by mouth Daily. 90 tablet 3   • lidocaine (XYLOCAINE) 5 % ointment APPLY affected area three times a day if needed  0   • loratadine (CLARITIN) 10 MG tablet Take 1 tablet by mouth Daily. 90 tablet 3   • Melatonin 10 MG capsule Take 2 capsules by mouth daily.     • methocarbamol (ROBAXIN) 500 MG tablet   0   • methscopolamine (PAMINE) 2.5 MG tablet Take 1 tablet by mouth 2 (Two) Times a Day As Needed (congestion). 120 tablet 5    • montelukast (SINGULAIR) 10 MG tablet Take 1 tablet by mouth Every Night. 90 tablet 3   • mupirocin (BACTROBAN) 2 % cream Apply  topically 3 (Three) Times a Day. 15 g 2   • naproxen (NAPROSYN) 500 MG tablet Take 1 tablet by mouth 2 (Two) Times a Day With Meals. 180 tablet 3   • olopatadine (PATADAY) 0.2 % solution ophthalmic solution 1 drop daily. 1 drop in each eye ophthalmic daily     • olopatadine (PATANASE) 0.6 % solution nasal solution by Each Nare route 2 (two) times a day.     • sertraline (ZOLOFT) 25 MG tablet take 1 tablet by mouth once daily 90 tablet 9   • sertraline (ZOLOFT) 25 MG tablet Take 1 tablet by mouth Daily. 90 tablet 3   • terbinafine (LamiSIL) 250 MG tablet Take 250 mg by mouth daily.     • triamcinolone (KENALOG) 0.1 % cream Apply  topically to the appropriate area as directed 3 (Three) Times a Day. To arm for itching 45 g 1     No current facility-administered medications on file prior to visit.       Allergies   Allergen Reactions   • Penicillins       Past Medical History:   Diagnosis Date   • Allergic rhinitis    • Benign paroxysmal positional vertigo    • Bursitis/tendonitis, shoulder    • Chronic nonsuppurative otitis media    • Depressive disorder    • Fatigue    • Headache, cervicogenic    • Hypothyroidism    • Insomnia    • Myalgia    • Nail fungus    • Nervousness    • OA (osteoarthritis) of shoulder    • CHIQUITA (obstructive sleep apnea)    • Osteoporosis    • Preventive measure    • Sciatica of right side    • TSH (thyroid-stimulating hormone deficiency)    • Vitamin D deficiency      Past Surgical History:   Procedure Laterality Date   • FOOT SURGERY Right     pinched nerve   • FOOT SURGERY Left 01/17/2019    [address] [place] [address] fusion - Dr. Gwendolyn Del Rosario ; [facility]    • NOSE SURGERY  1985     Family History   Problem Relation Age of Onset   • Stroke Mother    • Cancer Father    • Breast cancer Sister 73   • Breast cancer Paternal Aunt         DX AGE UNKNOWN   • Breast  cancer Cousin         SEVERAL COUSINS - DX AGES UNKNOWN   • Breast cancer Paternal Aunt         DX AGE UNKNOWN   • Ovarian cancer Neg Hx       Social History     Socioeconomic History   • Marital status:      Spouse name: Not on file   • Number of children: Not on file   • Years of education: Not on file   • Highest education level: Not on file   Social Needs   • Financial resource strain: Not on file   • Food insecurity - worry: Not on file   • Food insecurity - inability: Not on file   • Transportation needs - medical: Not on file   • Transportation needs - non-medical: Not on file   Occupational History   • Not on file   Tobacco Use   • Smoking status: Never Smoker   • Smokeless tobacco: Never Used   Substance and Sexual Activity   • Alcohol use: Yes     Comment: social   • Drug use: No   • Sexual activity: Yes   Other Topics Concern   • Not on file   Social History Narrative   • Not on file        Review of Systems   Constitutional: Positive for activity change.   HENT: Negative.    Eyes: Negative.    Respiratory: Negative.    Cardiovascular: Negative.    Gastrointestinal: Negative.    Endocrine: Negative.    Genitourinary: Negative.    Musculoskeletal: Positive for arthralgias and gait problem.   Skin: Negative.    Allergic/Immunologic: Negative.    Hematological: Negative.    Psychiatric/Behavioral: Negative.        The following portions of the patient's history were reviewed and updated as appropriate: allergies, current medications, past family history, past medical history, past social history, past surgical history and problem list.      Objective      Physical Exam  LMP  (LMP Unknown)     There is no height or weight on file to calculate BMI.    Patient is well developed, well nourished and in no acute distress.  Alert and oriented x 3.    Ortho Exam  Left foot exam:  Incision well healed with clean pin site  Minimal post op swelling  Pulses 2+  nvi    Medical Decision Making    Data Review:    ordered and reviewed x-rays today    Assessment:  1. Surgery follow-up        Plan:  Doing well status post left first MTP joint fusion with Dr. Huff and 1/17/19.  X-rays show excellent alignment of the toe with healing fusion.  Hardware is intact.  Pins removed today.  Patient may begin weightbearing in the boot.  She will keep her knee walker for long distances.  She will return to see Dr. Huff in 6-8 weeks with x-rays or sooner if needed.      Alyson Arthur MA  02/26/19  12:53 PM

## 2019-02-26 NOTE — TELEPHONE ENCOUNTER
PT WANTS TO KNOW IF SHE HAS TO SLEEP IN THE BOOT THAT SHE WAS PUT IN TODAY. SHE WOULD LIKE TO BE CALLED BACK.

## 2019-02-26 NOTE — TELEPHONE ENCOUNTER
Returned patients call to verify that per Stephanie she does not have to sleep in her boot. She should be wearing it any time she is up and moving, but when settled she does not have to wear it.

## 2019-04-10 ENCOUNTER — OFFICE VISIT (OUTPATIENT)
Dept: ORTHOPEDIC SURGERY | Facility: CLINIC | Age: 69
End: 2019-04-10

## 2019-04-10 DIAGNOSIS — Z98.890 STATUS POST LEFT FOOT SURGERY: Primary | ICD-10-CM

## 2019-04-10 PROCEDURE — 99024 POSTOP FOLLOW-UP VISIT: CPT | Performed by: ORTHOPAEDIC SURGERY

## 2019-04-10 NOTE — PROGRESS NOTES
Post-op Follow-up (6 week f/u, status post Left 1st MPJ fusion 01/17/19)      Marci Snyder is 3 months status post left first MTP fusion 1/17/2019. She reports no fever, chills.  She reports pain is resolved.  They have been taking off meds now for DVT prophylaxis.  They have been weight bearing in boot.      Past Surgical History:   Procedure Laterality Date   • FOOT SURGERY Right     pinched nerve   • FOOT SURGERY Left 01/17/2019    1st MPJ fusion - Dr. Gwendolyn Del Rosario ; [facility]    • NOSE SURGERY  1985       LMP  (LMP Unknown)         No erythema, no drainage, no sign of infection, normal post op swelling.  Excellent alignment left foot, mild normal swelling, no tenderness    ordered and reviewed x-rays today    Assessment and Plan:   1. Status post left foot surgery  She has done very well.  She may now wean herself out of the boot and we talked about how to do that.  She reports continued pain in the right foot, she thinks she might want to consider surgery around the holidays or January.  I will see her in October with standing 2 views of both feet.

## 2019-05-22 ENCOUNTER — OFFICE VISIT (OUTPATIENT)
Dept: ORTHOPEDIC SURGERY | Facility: CLINIC | Age: 69
End: 2019-05-22

## 2019-05-22 VITALS — BODY MASS INDEX: 20.73 KG/M2 | WEIGHT: 139.99 LBS | HEIGHT: 69 IN | HEART RATE: 67 BPM | OXYGEN SATURATION: 97 %

## 2019-05-22 DIAGNOSIS — Z98.890 STATUS POST LEFT FOOT SURGERY: Primary | ICD-10-CM

## 2019-05-22 PROCEDURE — 99212 OFFICE O/P EST SF 10 MIN: CPT | Performed by: ORTHOPAEDIC SURGERY

## 2019-05-22 NOTE — PROGRESS NOTES
"Post-op Follow-up (1 month follow up - 4 months status post Left 1st MPJ fusion 01/17/19)      Marci Snyder is 4 months status post left first MTPJ fusion, 1/17/2019.  She comes in early because she has been concerned that she is having some swelling after a long day on her feet.  She is having some aching pain.  She ordered herself a postop shoe off the Internet and finds that that helps a great deal.  She did not really like wearing the boot.  She is walking 2 miles a day and riding her bike.  At the recent [employer] she was on her feet from 7 AM until 10 PM.  She wanted to make sure nothing was wrong.  She has some tenderness around the dorsal plate and incision.  No fevers or chills, no drainage, the swelling goes down overnight    Past Surgical History:   Procedure Laterality Date   • FOOT SURGERY Right     pinched nerve   • FOOT SURGERY Left 01/17/2019    1st MPJ fusion - Dr. Gwendolyn Del Rosario ; [facility]    • NOSE SURGERY  1985       Pulse 67   Ht 174 cm (68.5\")   Wt 63.5 kg (139 lb 15.9 oz)   LMP  (LMP Unknown)   SpO2 97%   Breastfeeding? No   BMI 20.97 kg/m²         No erythema, no drainage, no sign of infection, normal post op swelling.  Excellent alignment left great toe, mildly tender over the incision, no signs of complication    ordered and reviewed x-rays today    Assessment and Plan:   1. Status post left foot surgery  I showed her the x-ray, there is no signs of complication.  She is actually doing far more activity than most people are doing at this point in time.  I advised her to slow down somewhat.  I explained that the swelling is normal.  I reminded her that it really takes a year to see the and results.  She is only 4 months out and it is remarkable that she is actually able to do as much as she is done.  When watching her walk in the dominguez she has a very brisk pace, normal gait, normal stride length.  She was reassured I will see her again as already " scheduled with 2 views of the foot

## 2019-05-30 ENCOUNTER — OFFICE VISIT (OUTPATIENT)
Dept: FAMILY MEDICINE CLINIC | Facility: CLINIC | Age: 69
End: 2019-05-30

## 2019-05-30 VITALS
HEART RATE: 76 BPM | TEMPERATURE: 98.2 F | HEIGHT: 69 IN | OXYGEN SATURATION: 99 % | SYSTOLIC BLOOD PRESSURE: 120 MMHG | WEIGHT: 148 LBS | BODY MASS INDEX: 21.92 KG/M2 | DIASTOLIC BLOOD PRESSURE: 78 MMHG

## 2019-05-30 DIAGNOSIS — M47.812 SPONDYLOSIS OF CERVICAL REGION WITHOUT MYELOPATHY OR RADICULOPATHY: Primary | ICD-10-CM

## 2019-05-30 DIAGNOSIS — M47.816 LUMBAR SPONDYLOSIS: ICD-10-CM

## 2019-05-30 DIAGNOSIS — M15.9 PRIMARY OSTEOARTHRITIS INVOLVING MULTIPLE JOINTS: ICD-10-CM

## 2019-05-30 PROCEDURE — 99213 OFFICE O/P EST LOW 20 MIN: CPT | Performed by: FAMILY MEDICINE

## 2019-05-30 RX ORDER — TRAMADOL HYDROCHLORIDE 50 MG/1
50 TABLET ORAL EVERY 6 HOURS PRN
Qty: 120 TABLET | Refills: 2 | Status: SHIPPED | OUTPATIENT
Start: 2019-05-30 | End: 2019-05-30 | Stop reason: SDUPTHER

## 2019-05-30 RX ORDER — TRAMADOL HYDROCHLORIDE 50 MG/1
50 TABLET ORAL EVERY 6 HOURS PRN
Qty: 120 TABLET | Refills: 5 | Status: SHIPPED | OUTPATIENT
Start: 2019-05-30 | End: 2021-01-13 | Stop reason: SDUPTHER

## 2019-05-30 NOTE — PROGRESS NOTES
Subjective   Marci Snyder is a 68 y.o. female    Chief Complaint    Chronic neck pain  Chronic back pain  Osteoarthritis    History of Present Illness  Patient presents today for follow-up regarding chronic neck and back pain as well as chronic arthritis pain.  She has been on hydrocodone for many years.  We had a discussion at 1 of her sisters visits a few weeks ago where she expressed interest in trying to get off the hydrocodone.  She is worried that there is nothing else she can take for pain and we had a discussion regarding tramadol.  She has been weaning herself off the hydrocodone and would like to try tramadol to see if she can get by without a true opioid.  She does not have any radicular symptoms associated with her neck or lower back pain.  They have been stable problems for many years.    The following portions of the patient's history were reviewed and updated as appropriate: allergies, current medications, past social history and problem list    Review of Systems   Constitutional: Negative.    HENT: Negative for trouble swallowing and voice change.    Respiratory: Negative.    Gastrointestinal: Negative.    Genitourinary: Negative.    Musculoskeletal: Positive for back pain and neck pain. Negative for arthralgias, gait problem and myalgias.   Skin: Negative.    Neurological: Negative for dizziness, tremors, weakness and numbness.   Psychiatric/Behavioral: Negative.        Objective     Vitals:    05/30/19 1445   BP: 120/78   Pulse: 76   Temp: 98.2 °F (36.8 °C)   SpO2: 99%       Physical Exam   Constitutional: She is oriented to person, place, and time. She appears well-developed and well-nourished.   Neck: Spinous process tenderness and muscular tenderness present. Decreased range of motion present. No edema and no erythema present.   Cardiovascular: Normal rate and regular rhythm.   Pulmonary/Chest: Effort normal and breath sounds normal.   Abdominal: Soft. Bowel sounds are normal.    Musculoskeletal:        Lumbar back: She exhibits decreased range of motion, tenderness, bony tenderness and pain. She exhibits no swelling, no deformity and no spasm.   Neurological: She is alert and oriented to person, place, and time. She has normal reflexes.   Nursing note and vitals reviewed.      Assessment/Plan   Problem List Items Addressed This Visit     None      Visit Diagnoses     Spondylosis of cervical region without myelopathy or radiculopathy    -  Primary    Relevant Medications    traMADol (ULTRAM) 50 MG tablet    Lumbar spondylosis        Relevant Medications    traMADol (ULTRAM) 50 MG tablet    Primary osteoarthritis involving multiple joints        Relevant Medications    traMADol (ULTRAM) 50 MG tablet

## 2019-09-16 ENCOUNTER — TRANSCRIBE ORDERS (OUTPATIENT)
Dept: ADMINISTRATIVE | Facility: HOSPITAL | Age: 69
End: 2019-09-16

## 2019-09-16 DIAGNOSIS — Z12.31 VISIT FOR SCREENING MAMMOGRAM: Primary | ICD-10-CM

## 2019-10-08 ENCOUNTER — LAB REQUISITION (OUTPATIENT)
Dept: LAB | Facility: HOSPITAL | Age: 69
End: 2019-10-08

## 2019-10-08 ENCOUNTER — OFFICE VISIT (OUTPATIENT)
Dept: FAMILY MEDICINE CLINIC | Facility: CLINIC | Age: 69
End: 2019-10-08

## 2019-10-08 VITALS
HEIGHT: 69 IN | BODY MASS INDEX: 22.36 KG/M2 | DIASTOLIC BLOOD PRESSURE: 70 MMHG | OXYGEN SATURATION: 98 % | WEIGHT: 151 LBS | HEART RATE: 74 BPM | SYSTOLIC BLOOD PRESSURE: 136 MMHG | TEMPERATURE: 98.2 F

## 2019-10-08 DIAGNOSIS — Z00.00 ROUTINE GENERAL MEDICAL EXAMINATION AT A HEALTH CARE FACILITY: ICD-10-CM

## 2019-10-08 DIAGNOSIS — M19.90 ARTHRITIS: ICD-10-CM

## 2019-10-08 DIAGNOSIS — R25.2 MUSCLE CRAMPS: Primary | ICD-10-CM

## 2019-10-08 PROCEDURE — 96372 THER/PROPH/DIAG INJ SC/IM: CPT | Performed by: PHYSICIAN ASSISTANT

## 2019-10-08 PROCEDURE — 36415 COLL VENOUS BLD VENIPUNCTURE: CPT | Performed by: PHYSICIAN ASSISTANT

## 2019-10-08 PROCEDURE — 99213 OFFICE O/P EST LOW 20 MIN: CPT | Performed by: PHYSICIAN ASSISTANT

## 2019-10-08 RX ORDER — CYANOCOBALAMIN 1000 UG/ML
1000 INJECTION, SOLUTION INTRAMUSCULAR; SUBCUTANEOUS
Status: SHIPPED | OUTPATIENT
Start: 2019-10-08

## 2019-10-08 RX ORDER — ACETAMINOPHEN 500 MG
TABLET ORAL
COMMUNITY
End: 2021-08-13

## 2019-10-08 RX ADMIN — CYANOCOBALAMIN 1000 MCG: 1000 INJECTION, SOLUTION INTRAMUSCULAR; SUBCUTANEOUS at 16:48

## 2019-10-08 NOTE — PROGRESS NOTES
Subjective   Marci Snyder is a 68 y.o. female  Muscle Pain (muscle cramps in right leg and right hand x4 months, increased bike riding )      History of Present Illness  Patient comes in for evaluation of muscle cramps in her right hand when she rides her bicycle and right leg at nighttime.  Symptoms have been worse over the past 3 to 4 months that she has been increasing her cycling.  She states that she usually cycles about 20 miles 2-3 times a week.  She has increased her over-the-counter vitamin supplements which she feels has helped with some of the leg cramps.  The following portions of the patient's history were reviewed and updated as appropriate: allergies, current medications, past social history and problem list    Review of Systems   Constitutional: Positive for activity change.   Respiratory: Negative.    Cardiovascular: Negative.    Genitourinary: Negative.    Musculoskeletal: Positive for arthralgias and myalgias. Negative for gait problem and joint swelling.   Skin: Negative.    Neurological: Negative for weakness and numbness.   Psychiatric/Behavioral: Positive for sleep disturbance.       Objective     Vitals:    10/08/19 1435   BP: 136/70   Pulse: 74   Temp: 98.2 °F (36.8 °C)   SpO2: 98%       Physical Exam   Constitutional: She is oriented to person, place, and time. She appears well-developed and well-nourished. No distress.   Cardiovascular: Normal rate, regular rhythm and intact distal pulses.   Musculoskeletal: Normal range of motion. She exhibits tenderness and deformity. She exhibits no edema.   Arthritic changes noted in hands   Neurological: She is alert and oriented to person, place, and time. She exhibits normal muscle tone. Coordination normal.   Skin: Skin is warm and dry. No rash noted. She is not diaphoretic. No erythema. No pallor.   Psychiatric: She has a normal mood and affect. Her behavior is normal.   Nursing note and vitals reviewed.      Assessment/Plan     Diagnoses  and all orders for this visit:    Muscle cramps  -     Comprehensive metabolic panel; Future  -     CBC (No Diff)  -     Magnesium; Future  -     cyanocobalamin injection 1,000 mcg  -     Magnesium  -     Comprehensive metabolic panel    Arthritis    Other orders  -     Calcium Carbonate-Vit D-Min (CALCIUM 1200) 9614-9541 MG-UNIT chewable tablet; Chew.  -     Magnesium 100 MG tablet; Take  by mouth.  -     Potassium 75 MG tablet; Take  by mouth.  -     diclofenac (VOLTAREN) 1 % gel gel; Apply 4 g topically to the appropriate area as directed 2 (Two) Times a Day. To hands as needed  -     Comprehensive Metabolic Panel  -     Magnesium

## 2019-10-09 LAB
ALBUMIN SERPL-MCNC: 4.6 G/DL (ref 3.5–5.2)
ALBUMIN/GLOB SERPL: 2.2 G/DL
ALP SERPL-CCNC: 73 U/L (ref 39–117)
ALT SERPL-CCNC: 23 U/L (ref 1–33)
AST SERPL-CCNC: 23 U/L (ref 1–32)
BILIRUB SERPL-MCNC: 0.2 MG/DL (ref 0.2–1.2)
BUN SERPL-MCNC: 22 MG/DL (ref 8–23)
BUN/CREAT SERPL: 21.8 (ref 7–25)
CALCIUM SERPL-MCNC: 9.7 MG/DL (ref 8.6–10.5)
CHLORIDE SERPL-SCNC: 103 MMOL/L (ref 98–107)
CO2 SERPL-SCNC: 30 MMOL/L (ref 22–29)
CREAT SERPL-MCNC: 1.01 MG/DL (ref 0.57–1)
ERYTHROCYTE [DISTWIDTH] IN BLOOD BY AUTOMATED COUNT: 11.8 % (ref 12.3–15.4)
GLOBULIN SER CALC-MCNC: 2.1 GM/DL
GLUCOSE SERPL-MCNC: 78 MG/DL (ref 65–99)
HCT VFR BLD AUTO: 38.7 % (ref 34–46.6)
HGB BLD-MCNC: 13.5 G/DL (ref 12–15.9)
MAGNESIUM SERPL-MCNC: 2.3 MG/DL (ref 1.6–2.4)
MCH RBC QN AUTO: 32.3 PG (ref 26.6–33)
MCHC RBC AUTO-ENTMCNC: 34.9 G/DL (ref 31.5–35.7)
MCV RBC AUTO: 92.6 FL (ref 79–97)
PLATELET # BLD AUTO: 246 10*3/MM3 (ref 140–450)
POTASSIUM SERPL-SCNC: 5.1 MMOL/L (ref 3.5–5.2)
PROT SERPL-MCNC: 6.7 G/DL (ref 6–8.5)
RBC # BLD AUTO: 4.18 10*6/MM3 (ref 3.77–5.28)
SODIUM SERPL-SCNC: 142 MMOL/L (ref 136–145)
WBC # BLD AUTO: 5.56 10*3/MM3 (ref 3.4–10.8)

## 2019-11-07 ENCOUNTER — HOSPITAL ENCOUNTER (OUTPATIENT)
Dept: MAMMOGRAPHY | Facility: HOSPITAL | Age: 69
Discharge: HOME | End: 2019-11-07
Admitting: FAMILY MEDICINE

## 2019-11-07 DIAGNOSIS — Z12.31 VISIT FOR SCREENING MAMMOGRAM: ICD-10-CM

## 2019-11-07 PROCEDURE — 77067 SCR MAMMO BI INCL CAD: CPT

## 2019-11-07 PROCEDURE — 77063 BREAST TOMOSYNTHESIS BI: CPT

## 2019-11-07 PROCEDURE — 77067 SCR MAMMO BI INCL CAD: CPT | Performed by: RADIOLOGY

## 2019-11-07 PROCEDURE — 77063 BREAST TOMOSYNTHESIS BI: CPT | Performed by: RADIOLOGY

## 2019-11-08 ENCOUNTER — OFFICE VISIT (OUTPATIENT)
Dept: FAMILY MEDICINE CLINIC | Facility: CLINIC | Age: 69
End: 2019-11-08

## 2019-11-08 VITALS
WEIGHT: 151 LBS | HEART RATE: 72 BPM | BODY MASS INDEX: 22.36 KG/M2 | TEMPERATURE: 98.3 F | OXYGEN SATURATION: 99 % | SYSTOLIC BLOOD PRESSURE: 128 MMHG | HEIGHT: 69 IN | DIASTOLIC BLOOD PRESSURE: 60 MMHG

## 2019-11-08 DIAGNOSIS — L30.9 ECZEMA, UNSPECIFIED TYPE: ICD-10-CM

## 2019-11-08 DIAGNOSIS — M47.812 SPONDYLOSIS OF CERVICAL REGION WITHOUT MYELOPATHY OR RADICULOPATHY: ICD-10-CM

## 2019-11-08 DIAGNOSIS — M19.90 ARTHRITIS: ICD-10-CM

## 2019-11-08 DIAGNOSIS — F32.A DEPRESSIVE DISORDER: ICD-10-CM

## 2019-11-08 DIAGNOSIS — J30.9 ALLERGIC RHINITIS, UNSPECIFIED SEASONALITY, UNSPECIFIED TRIGGER: ICD-10-CM

## 2019-11-08 DIAGNOSIS — G62.9 NEUROPATHY: Primary | ICD-10-CM

## 2019-11-08 PROCEDURE — 99214 OFFICE O/P EST MOD 30 MIN: CPT | Performed by: PHYSICIAN ASSISTANT

## 2019-11-08 PROCEDURE — 96372 THER/PROPH/DIAG INJ SC/IM: CPT | Performed by: PHYSICIAN ASSISTANT

## 2019-11-08 RX ORDER — CYANOCOBALAMIN 1000 UG/ML
1000 INJECTION, SOLUTION INTRAMUSCULAR; SUBCUTANEOUS ONCE
Status: COMPLETED | OUTPATIENT
Start: 2019-11-08 | End: 2019-11-08

## 2019-11-08 RX ORDER — LORATADINE 10 MG/1
10 TABLET ORAL DAILY
Qty: 90 TABLET | Refills: 3 | Status: SHIPPED | OUTPATIENT
Start: 2019-11-08

## 2019-11-08 RX ORDER — LEVOCETIRIZINE DIHYDROCHLORIDE 5 MG/1
5 TABLET, FILM COATED ORAL DAILY
Refills: 0 | COMMUNITY
Start: 2019-11-07

## 2019-11-08 RX ORDER — BUPROPION HYDROCHLORIDE 150 MG/1
150 TABLET ORAL 2 TIMES DAILY
Qty: 180 TABLET | Refills: 3 | Status: SHIPPED | OUTPATIENT
Start: 2019-11-08 | End: 2020-11-20

## 2019-11-08 RX ADMIN — CYANOCOBALAMIN 1000 MCG: 1000 INJECTION, SOLUTION INTRAMUSCULAR; SUBCUTANEOUS at 16:04

## 2019-11-08 NOTE — PROGRESS NOTES
Subjective   Marci Snyder is a 69 y.o. female  Peripheral Neuropathy (Follow up on Neuropathy, req b12 injection ); Med Refill (Req med refills on Claritanm Wellbutrin and Tramadol ); and Itching  (increased itching possibly due to allergies on both shoulders x2 weeks )      History of Present Illness  Patient comes in for follow-up on 4 chronic medical problems which are currently stable on medication and needing medication refills.  Additionally she is having a new problem which is uncontrolled and needing evaluation treatment.  She is here for follow-up on peripheral neuropathy needing refills of tramadol which she takes 1 4 times daily and needing a B12 shot as well as follow-up on chronic allergic rhinitis which is stable on Claritin and depression which is stable on Wellbutrin.  Arthritic pain stable on tramadol.  She is noticed over the last couple weeks and itching sensation on her right shoulder after being exposed to a chemical insect repellent in her home.  She is tried over-the-counter lotions without any significant improvement.  States it itches all the time.  The following portions of the patient's history were reviewed and updated as appropriate: allergies, current medications, past social history and problem list    Review of Systems   Constitutional: Negative for appetite change, chills, fatigue and fever.   HENT: Positive for congestion. Negative for ear pain, hearing loss, postnasal drip, rhinorrhea, sinus pressure, sneezing, sore throat and trouble swallowing.         Congestion stable on medication   Eyes: Negative for itching.   Respiratory: Negative for cough, chest tightness and shortness of breath.    Cardiovascular: Negative for chest pain.   Gastrointestinal: Negative for abdominal pain, diarrhea and nausea.   Musculoskeletal: Positive for arthralgias and myalgias.        Neuropathy stable on medication   Skin: Positive for color change and rash. Negative for pallor and wound.    Neurological: Positive for numbness. Negative for dizziness, tremors, weakness, light-headedness and headaches.   Psychiatric/Behavioral: Negative for agitation, behavioral problems, confusion, decreased concentration, dysphoric mood, sleep disturbance and suicidal ideas. The patient is not nervous/anxious.         Depression stable on medication       Objective     Vitals:    11/08/19 1554   BP: 128/60   Pulse: 72   Temp: 98.3 °F (36.8 °C)   SpO2: 99%       Physical Exam   Constitutional: She is oriented to person, place, and time. She appears well-developed and well-nourished. No distress.   HENT:   Head: Normocephalic and atraumatic.   Right Ear: External ear normal.   Left Ear: External ear normal.   Nose: Nose normal.   Mouth/Throat: Oropharynx is clear and moist.   Eyes: Conjunctivae are normal.   Cardiovascular: Normal rate, regular rhythm and normal heart sounds.   Pulmonary/Chest: Effort normal and breath sounds normal.   Neurological: She is alert and oriented to person, place, and time. A sensory deficit is present. No cranial nerve deficit. Coordination normal.   Skin: Skin is warm and dry. No rash noted. She is not diaphoretic. No erythema. No pallor.   Excoriated dry patch right anterior shoulder, scaly, eczematous   Nursing note and vitals reviewed.      Assessment/Plan     Diagnoses and all orders for this visit:    Neuropathy  -     cyanocobalamin injection 1,000 mcg    Depressive disorder  -     buPROPion XL (WELLBUTRIN XL) 150 MG 24 hr tablet; Take 1 tablet by mouth 2 (Two) Times a Day.    Allergic rhinitis, unspecified seasonality, unspecified trigger    Eczema, unspecified type    Spondylosis of cervical region without myelopathy or radiculopathy    Arthritis    Other orders  -     levocetirizine (XYZAL) 5 MG tablet; Take 5 mg by mouth Daily.  -     loratadine (CLARITIN) 10 MG tablet; Take 1 tablet by mouth Daily.  -     Discontinue: fluocinonide-emollient (LIDEX-E) 0.05 % cream; Apply   topically to the appropriate area as directed 2 (Two) Times a Day.  -     fluocinonide-emollient (LIDEX-E) 0.05 % cream; Apply  topically to the appropriate area as directed 2 (Two) Times a Day.    Refill tramadol 50 mg 1 every 6 hours for chronic pain #120 with 5 refills.  Discussed abuse potential of medication, Octavio reviewed, appropriate, follow-up in 6 months and as needed.

## 2020-01-08 ENCOUNTER — OFFICE VISIT (OUTPATIENT)
Dept: ORTHOPEDIC SURGERY | Facility: CLINIC | Age: 70
End: 2020-01-08

## 2020-01-08 VITALS — OXYGEN SATURATION: 98 % | WEIGHT: 136.69 LBS | BODY MASS INDEX: 20.24 KG/M2 | HEART RATE: 67 BPM | HEIGHT: 69 IN

## 2020-01-08 DIAGNOSIS — M79.671 FOOT PAIN, RIGHT: Primary | ICD-10-CM

## 2020-01-08 DIAGNOSIS — M21.6X1 ACQUIRED METATARSUS VARUS OF RIGHT FOOT: ICD-10-CM

## 2020-01-08 DIAGNOSIS — M24.574 CONTRACTURE OF JOINT OF RIGHT FOOT: ICD-10-CM

## 2020-01-08 PROCEDURE — 99213 OFFICE O/P EST LOW 20 MIN: CPT | Performed by: ORTHOPAEDIC SURGERY

## 2020-01-08 RX ORDER — DOXYCYCLINE HYCLATE 100 MG
TABLET ORAL
COMMUNITY
Start: 2020-01-06 | End: 2020-01-31

## 2020-01-08 NOTE — PROGRESS NOTES
NEW PATIENT    Patient: Marci Snyder  : 1950    Primary Care Provider: Jimbo Vuong MD    Requesting Provider: As above    Pain of the Right Foot      History    Chief Complaint: Right foot pain    History of Present Illness: She returns reporting that she was riding her stationary bike and developed some redness over the bunion on the right foot.  She was seen at wound care center and they put her on an antibiotic and gave her a adhesive pad dressing.  She has not had any drainage.  No fever no chills.  She reports she is very happy with the left foot, she is able to hike, no further skin problems, no pain.    Current Outpatient Medications on File Prior to Visit   Medication Sig Dispense Refill   • buPROPion XL (WELLBUTRIN XL) 150 MG 24 hr tablet Take 1 tablet by mouth 2 (Two) Times a Day. 180 tablet 3   • Calcium Carbonate-Vit D-Min (CALCIUM 1200) 8407-1652 MG-UNIT chewable tablet Chew.     • cetirizine (ZyrTEC) 10 MG tablet Take 10 mg by mouth daily.     • Cholecalciferol (VITAMIN D3) 2000 UNITS tablet Take  by mouth.     • diclofenac (VOLTAREN) 1 % gel gel Apply 4 g topically to the appropriate area as directed 2 (Two) Times a Day. To hands as needed 100 g 11   • doxycycline (VIBRAMYICN) 100 MG tablet      • levothyroxine (SYNTHROID, LEVOTHROID) 25 MCG tablet Take 1 tablet by mouth Daily. 90 tablet 3   • loratadine (CLARITIN) 10 MG tablet Take 1 tablet by mouth Daily. 90 tablet 3   • Magnesium 100 MG tablet Take  by mouth.     • Melatonin 10 MG capsule Take 2 capsules by mouth daily.     • methscopolamine (PAMINE) 2.5 MG tablet Take 1 tablet by mouth 2 (Two) Times a Day As Needed (congestion). 120 tablet 5   • montelukast (SINGULAIR) 10 MG tablet Take 1 tablet by mouth Every Night. 90 tablet 3   • naproxen (NAPROSYN) 500 MG tablet Take 1 tablet by mouth 2 (Two) Times a Day With Meals. 180 tablet 3   • Potassium 75 MG tablet Take  by mouth.     • sertraline (ZOLOFT) 25 MG tablet  take 1 tablet by mouth once daily 90 tablet 9   • terbinafine (LamiSIL) 250 MG tablet Take 250 mg by mouth daily.     • traMADol (ULTRAM) 50 MG tablet Take 1 tablet by mouth Every 6 (Six) Hours As Needed for Severe Pain . 120 tablet 5   • triamcinolone (KENALOG) 0.1 % cream Apply  topically to the appropriate area as directed 3 (Three) Times a Day. To arm for itching 45 g 1   • baclofen (LIORESAL) 10 MG tablet Take 1 tablet by mouth Daily. 30 tablet 5   • Chlorcyclizine-Pseudoephed (STAHIST AD) 25-60 MG tablet Take 1 tablet by mouth 2 (two) times a day.     • ESTRACE VAGINAL 0.1 MG/GM vaginal cream   1   • fluocinonide-emollient (LIDEX-E) 0.05 % cream Apply  topically to the appropriate area as directed 2 (Two) Times a Day. 30 g 1   • FLUZONE HIGH-DOSE 0.5 ML suspension prefilled syringe injection inject 0.5 milliliter intramuscularly  0   • levocetirizine (XYZAL) 5 MG tablet Take 5 mg by mouth Daily.  0   • lidocaine (XYLOCAINE) 5 % ointment APPLY affected area three times a day if needed  0   • methocarbamol (ROBAXIN) 500 MG tablet   0   • mupirocin (BACTROBAN) 2 % cream Apply  topically 3 (Three) Times a Day. 15 g 2   • olopatadine (PATADAY) 0.2 % solution ophthalmic solution 1 drop daily. 1 drop in each eye ophthalmic daily     • olopatadine (PATANASE) 0.6 % solution nasal solution by Each Nare route 2 (two) times a day.     • sertraline (ZOLOFT) 25 MG tablet Take 1 tablet by mouth Daily. 90 tablet 3     Current Facility-Administered Medications on File Prior to Visit   Medication Dose Route Frequency Provider Last Rate Last Dose   • cyanocobalamin injection 1,000 mcg  1,000 mcg Intramuscular Q28 Days Saranya Terrell PA-C   1,000 mcg at 10/08/19 1648      Allergies   Allergen Reactions   • Penicillins       Past Medical History:   Diagnosis Date   • Allergic rhinitis    • Benign paroxysmal positional vertigo    • Bursitis/tendonitis, shoulder    • Chronic nonsuppurative otitis media    • Depressive disorder     • Fatigue    • Headache, cervicogenic    • Hypothyroidism    • Insomnia    • Myalgia    • Nail fungus    • Nervousness    • OA (osteoarthritis) of shoulder    • CHIQUITA (obstructive sleep apnea)    • Osteoporosis    • Preventive measure    • Sciatica of right side    • TSH (thyroid-stimulating hormone deficiency)    • Vitamin D deficiency      Past Surgical History:   Procedure Laterality Date   • FOOT SURGERY Right     pinched nerve   • FOOT SURGERY Left 01/17/2019    1st MPJ fusion - Dr. Gwendolyn Del Rosario ; [facility]    • NOSE SURGERY  1985     Family History   Problem Relation Age of Onset   • Stroke Mother    • Cancer Father    • Breast cancer Sister 73   • Breast cancer Paternal Aunt         DX AGE UNKNOWN   • Breast cancer Cousin         SEVERAL COUSINS - DX AGES UNKNOWN   • Breast cancer Paternal Aunt         DX AGE UNKNOWN   • Ovarian cancer Neg Hx       Social History     Socioeconomic History   • Marital status:      Spouse name: Not on file   • Number of children: Not on file   • Years of education: Not on file   • Highest education level: Not on file   Tobacco Use   • Smoking status: Never Smoker   • Smokeless tobacco: Never Used   Substance and Sexual Activity   • Alcohol use: Yes     Comment: social   • Drug use: No   • Sexual activity: Yes        Review of Systems   Constitutional: Negative.    HENT: Negative.    Eyes: Negative.    Respiratory: Negative.    Cardiovascular: Negative.    Gastrointestinal: Negative.    Endocrine: Negative.    Genitourinary: Negative.    Musculoskeletal: Positive for arthralgias, back pain, neck pain and neck stiffness.   Skin: Negative.    Allergic/Immunologic: Positive for environmental allergies.   Neurological: Negative.    Hematological: Negative.    Psychiatric/Behavioral: Negative.        The following portions of the patient's history were reviewed and updated as appropriate: allergies, current medications, past family history, past medical  "history, past social history, past surgical history and problem list.    Physical Exam:   Pulse 67   Ht 174 cm (68.5\")   Wt 62 kg (136 lb 11 oz)   LMP  (LMP Unknown)   SpO2 98%   BMI 20.48 kg/m²   GENERAL: Body habitus: normal weight for height    Lower extremity edema: Right: trace; Left: trace    Varicose veins:  Right: mild; Left: mild    Gait: antalgic     Mental Status:  awake and alert; oriented to person, place, and time    Voice:  clear      Capillary Refill:  Brisk  MSK:        Foot:  Right:  Prominent hallux valgus metatarsus primus varus, red spot over the medial bunion indicating shoe pressure, but is confined to that area, no fluctuance, no open wound, no drainage,, it is approximately 5 mm in diameter; Left:  Excellent alignment, no tenderness             Medical Decision Making    Data Review:   ordered and reviewed x-rays today    Assessment and Plan/ Diagnosis/Treatment options:   1.  Acquired metatarsus varus of right foot  Prominent bunion on the right with shoe irritation, but no open wounds no definite infection.  I would recommend she stay out of the shoe to allow this to calm down.  We also talked about surgical options.  I would do a similar fusion of the first MTPJ.  She also has pain under the second metatarsal head and a painful hammertoe, therefore I would add a second toe PIP excision, metatarsal capsulotomy, plantar condylectomy of the second metatarsal, flexor tenotomy, and extensor tendon lengthening.  We talked about the nonweightbearing 6 to 8 weeks then walking boot 6 to 8 weeks.  She would not be able to drive for at least 6 weeks.  She is going to think about her options.  She is not certain she is ready to proceed.  I will be happy to see her anytime    2. Contracture of joint of right foot  As above            Radiology Ordered []  Radiology Reports Reviewed []      Radiology Images Reviewed []   Labs Reviewed []    Labs Ordered []   PCP Records Reviewed []    Provider " Records Reviewed []    ER Records Reviewed []    Hospital Records Reviewed []    History Obtained From Family []    Phone conversation with Provider []    Records Requested []

## 2020-01-31 ENCOUNTER — OFFICE VISIT (OUTPATIENT)
Dept: FAMILY MEDICINE CLINIC | Facility: CLINIC | Age: 70
End: 2020-01-31

## 2020-01-31 VITALS
TEMPERATURE: 98.3 F | HEIGHT: 69 IN | SYSTOLIC BLOOD PRESSURE: 128 MMHG | WEIGHT: 152 LBS | BODY MASS INDEX: 22.51 KG/M2 | OXYGEN SATURATION: 99 % | DIASTOLIC BLOOD PRESSURE: 62 MMHG | HEART RATE: 70 BPM

## 2020-01-31 DIAGNOSIS — Z00.00 MEDICARE ANNUAL WELLNESS VISIT, SUBSEQUENT: Primary | ICD-10-CM

## 2020-01-31 DIAGNOSIS — G62.9 NEUROPATHY: ICD-10-CM

## 2020-01-31 DIAGNOSIS — M47.816 LUMBAR SPONDYLOSIS: ICD-10-CM

## 2020-01-31 DIAGNOSIS — F32.A DEPRESSIVE DISORDER: ICD-10-CM

## 2020-01-31 DIAGNOSIS — E03.9 ACQUIRED HYPOTHYROIDISM: ICD-10-CM

## 2020-01-31 PROCEDURE — G0439 PPPS, SUBSEQ VISIT: HCPCS | Performed by: PHYSICIAN ASSISTANT

## 2020-01-31 PROCEDURE — 96160 PT-FOCUSED HLTH RISK ASSMT: CPT | Performed by: PHYSICIAN ASSISTANT

## 2020-01-31 PROCEDURE — 96372 THER/PROPH/DIAG INJ SC/IM: CPT | Performed by: PHYSICIAN ASSISTANT

## 2020-01-31 RX ORDER — LEVOTHYROXINE SODIUM 0.03 MG/1
25 TABLET ORAL DAILY
Qty: 90 TABLET | Refills: 3 | Status: SHIPPED | OUTPATIENT
Start: 2020-01-31 | End: 2020-03-20 | Stop reason: SDUPTHER

## 2020-01-31 RX ORDER — SERTRALINE HYDROCHLORIDE 25 MG/1
25 TABLET, FILM COATED ORAL DAILY
Qty: 90 TABLET | Refills: 3 | Status: SHIPPED | OUTPATIENT
Start: 2020-01-31 | End: 2021-01-13 | Stop reason: SDUPTHER

## 2020-01-31 RX ORDER — CYANOCOBALAMIN 1000 UG/ML
1000 INJECTION, SOLUTION INTRAMUSCULAR; SUBCUTANEOUS
Status: SHIPPED | OUTPATIENT
Start: 2020-01-31

## 2020-01-31 RX ADMIN — CYANOCOBALAMIN 1000 MCG: 1000 INJECTION, SOLUTION INTRAMUSCULAR; SUBCUTANEOUS at 14:54

## 2020-01-31 NOTE — PROGRESS NOTES
The ABCs of the Annual Wellness Visit  Subsequent Medicare Wellness Visit    Chief Complaint   Patient presents with   • Medicare Wellness-subsequent     Medicare Wellness Exam    • Med Refill     Med refill on Tramadol, Levothyroxine and Zoloft        Subjective   History of Present Illness:  Marci Snyder is a 69 y.o. female who presents for a Subsequent Medicare Wellness Visit.    HEALTH RISK ASSESSMENT    Recent Hospitalizations:  No hospitalization(s) within the last year.    Current Medical Providers:  Patient Care Team:  Jimbo Vuong MD as PCP - General (Family Medicine)    Smoking Status:  Social History     Tobacco Use   Smoking Status Never Smoker   Smokeless Tobacco Never Used       Alcohol Consumption:  Social History     Substance and Sexual Activity   Alcohol Use Yes    Comment: social       Depression Screen:   PHQ-2/PHQ-9 Depression Screening 1/31/2020   Little interest or pleasure in doing things 0   Feeling down, depressed, or hopeless 0   Total Score 0       Fall Risk Screen:  NING Fall Risk Assessment was completed, and patient is at LOW risk for falls.Assessment completed on:1/31/2020    Health Habits and Functional and Cognitive Screening:  Functional & Cognitive Status 1/31/2020   Do you have difficulty preparing food and eating? No   Do you have difficulty bathing yourself, getting dressed or grooming yourself? No   Do you have difficulty using the toilet? No   Do you have difficulty moving around from place to place? No   Do you have trouble with steps or getting out of a bed or a chair? No   Current Diet Well Balanced Diet   Dental Exam Up to date   Eye Exam Up to date   Exercise (times per week) 4 times per week   Current Exercise Activities Include Stationary Bicycling/Spin Class   Do you need help using the phone?  No   Are you deaf or do you have serious difficulty hearing?  No   Do you need help with transportation? No   Do you need help shopping? No   Do you  need help preparing meals?  No   Do you need help with housework?  No   Do you need help with laundry? No   Do you need help taking your medications? No   Do you need help managing money? No   Do you ever drive or ride in a car without wearing a seat belt? No   Have you felt unusual stress, anger or loneliness in the last month? No   Who do you live with? Spouse   If you need help, do you have trouble finding someone available to you? No   Have you been bothered in the last four weeks by sexual problems? No   Do you have difficulty concentrating, remembering or making decisions? No         Does the patient have evidence of cognitive impairment? No    Asprin use counseling:Does not need ASA (and currently is not on it)    Age-appropriate Screening Schedule:  Refer to the list below for future screening recommendations based on patient's age, sex and/or medical conditions. Orders for these recommended tests are listed in the plan section. The patient has been provided with a written plan.    Health Maintenance   Topic Date Due   • DXA SCAN  07/20/2018   • TDAP/TD VACCINES (1 - Tdap) 01/31/2021 (Originally 10/22/1961)   • MAMMOGRAM  11/07/2021   • COLONOSCOPY  10/01/2024   • INFLUENZA VACCINE  Completed   • ZOSTER VACCINE  Discontinued          The following portions of the patient's history were reviewed and updated as appropriate: allergies, current medications, past medical history, past social history, past surgical history and problem list.    Outpatient Medications Prior to Visit   Medication Sig Dispense Refill   • baclofen (LIORESAL) 10 MG tablet Take 1 tablet by mouth Daily. 30 tablet 5   • buPROPion XL (WELLBUTRIN XL) 150 MG 24 hr tablet Take 1 tablet by mouth 2 (Two) Times a Day. 180 tablet 3   • Calcium Carbonate-Vit D-Min (CALCIUM 1200) 1161-2384 MG-UNIT chewable tablet Chew.     • cetirizine (ZyrTEC) 10 MG tablet Take 10 mg by mouth daily.     • Cholecalciferol (VITAMIN D3) 2000 UNITS tablet Take  by  mouth.     • diclofenac (VOLTAREN) 1 % gel gel Apply 4 g topically to the appropriate area as directed 2 (Two) Times a Day. To hands as needed 100 g 11   • ESTRACE VAGINAL 0.1 MG/GM vaginal cream   1   • fluocinonide-emollient (LIDEX-E) 0.05 % cream Apply  topically to the appropriate area as directed 2 (Two) Times a Day. 30 g 1   • FLUZONE HIGH-DOSE 0.5 ML suspension prefilled syringe injection inject 0.5 milliliter intramuscularly  0   • levocetirizine (XYZAL) 5 MG tablet Take 5 mg by mouth Daily.  0   • lidocaine (XYLOCAINE) 5 % ointment APPLY affected area three times a day if needed  0   • loratadine (CLARITIN) 10 MG tablet Take 1 tablet by mouth Daily. 90 tablet 3   • Magnesium 100 MG tablet Take  by mouth.     • Melatonin 10 MG capsule Take 2 capsules by mouth daily.     • methocarbamol (ROBAXIN) 500 MG tablet   0   • methscopolamine (PAMINE) 2.5 MG tablet Take 1 tablet by mouth 2 (Two) Times a Day As Needed (congestion). 120 tablet 5   • montelukast (SINGULAIR) 10 MG tablet Take 1 tablet by mouth Every Night. 90 tablet 3   • mupirocin (BACTROBAN) 2 % cream Apply  topically 3 (Three) Times a Day. 15 g 2   • naproxen (NAPROSYN) 500 MG tablet Take 1 tablet by mouth 2 (Two) Times a Day With Meals. 180 tablet 3   • olopatadine (PATADAY) 0.2 % solution ophthalmic solution 1 drop daily. 1 drop in each eye ophthalmic daily     • olopatadine (PATANASE) 0.6 % solution nasal solution by Each Nare route 2 (two) times a day.     • Potassium 75 MG tablet Take  by mouth.     • terbinafine (LamiSIL) 250 MG tablet Take 250 mg by mouth daily.     • traMADol (ULTRAM) 50 MG tablet Take 1 tablet by mouth Every 6 (Six) Hours As Needed for Severe Pain . 120 tablet 5   • triamcinolone (KENALOG) 0.1 % cream Apply  topically to the appropriate area as directed 3 (Three) Times a Day. To arm for itching 45 g 1   • levothyroxine (SYNTHROID, LEVOTHROID) 25 MCG tablet Take 1 tablet by mouth Daily. 90 tablet 3   • sertraline (ZOLOFT) 25 MG  "tablet take 1 tablet by mouth once daily 90 tablet 9   • sertraline (ZOLOFT) 25 MG tablet Take 1 tablet by mouth Daily. 90 tablet 3   • Chlorcyclizine-Pseudoephed (STAHIST AD) 25-60 MG tablet Take 1 tablet by mouth 2 (two) times a day.     • doxycycline (VIBRAMYICN) 100 MG tablet        Facility-Administered Medications Prior to Visit   Medication Dose Route Frequency Provider Last Rate Last Dose   • cyanocobalamin injection 1,000 mcg  1,000 mcg Intramuscular Q28 Days Saranya Terrell PA-C   1,000 mcg at 10/08/19 1648       Patient Active Problem List   Diagnosis   • Osteoporosis   • Vitamin D deficiency   • Sciatica of right side   • Hypothyroidism   • Depressive disorder   • Osteoarthritis of shoulder   • Allergic rhinitis   • Benign paroxysmal positional vertigo   • Pain in both feet   • Acquired metatarsus varus of right foot   • Contracture of joint of right foot       Advanced Care Planning:  Patient does not have an advance directive - information provided to the patient today    Review of Systems   Constitutional: Positive for activity change.   Musculoskeletal: Positive for arthralgias and myalgias. Negative for gait problem and joint swelling.   Skin: Negative.    Neurological: Negative for weakness and numbness.       Compared to one year ago, the patient feels her physical health is better.  Compared to one year ago, the patient feels her mental health is the same.    Reviewed chart for potential of high risk medication in the elderly: yes  Reviewed chart for potential of harmful drug interactions in the elderly:yes    Objective         Vitals:    01/31/20 1428   BP: 128/62   Pulse: 70   Temp: 98.3 °F (36.8 °C)   TempSrc: Oral   SpO2: 99%   Weight: 68.9 kg (152 lb)   Height: 174 cm (68.5\")   PainSc:   8       Body mass index is 22.78 kg/m².  Discussed the patient's BMI with her. The BMI is in the acceptable range.    Physical Exam   Constitutional: She appears well-developed and well-nourished. No " distress.   Musculoskeletal: She exhibits tenderness ( +left shoulder and right foot pain). She exhibits no edema or deformity.   Neurological: She exhibits normal muscle tone. Coordination normal.   Skin: Skin is warm and dry. No rash noted. She is not diaphoretic. No erythema. No pallor.   Nursing note and vitals reviewed.            Assessment/Plan   Medicare Risks and Personalized Health Plan  CMS Preventative Services Quick Reference  Advance Directive Discussion    The above risks/problems have been discussed with the patient.  Pertinent information has been shared with the patient in the After Visit Summary.  Follow up plans and orders are seen below in the Assessment/Plan Section.    Diagnoses and all orders for this visit:    1. Medicare annual wellness visit, subsequent (Primary)    2. Acquired hypothyroidism  -     levothyroxine (SYNTHROID, LEVOTHROID) 25 MCG tablet; Take 1 tablet by mouth Daily.  Dispense: 90 tablet; Refill: 3    3. Neuropathy  -     cyanocobalamin injection 1,000 mcg    4. Lumbar spondylosis    5. Depressive disorder    Other orders  -     sertraline (ZOLOFT) 25 MG tablet; Take 1 tablet by mouth Daily.  Dispense: 90 tablet; Refill: 3      Follow Up:  Return in about 6 months (around 7/31/2020), or if symptoms worsen or fail to improve.     An After Visit Summary and PPPS were given to the patient.     + New prescription given at increased dosage for tramadol 50 mg 2 every 6 hours/4 times daily as needed for joint pain associated with worsening left shoulder pain with rotator cuff tendinitis and degenerative joint disease, degenerative joint disease and foot and degenerative disc disease and spine, 5 refills given, discussed abuse potential and controlled substance status of this medication, Octavio reviewed, appropriate.  Follow-up in 6 months and as needed.  Continue seeing orthopedics and chiropractor for treatments as well.

## 2020-03-17 DIAGNOSIS — E03.9 ACQUIRED HYPOTHYROIDISM: ICD-10-CM

## 2020-03-17 NOTE — TELEPHONE ENCOUNTER
PT IS REQUESTING A REFILL ON  levothyroxine (SYNTHROID, LEVOTHROID) 25 MCG tablet      PT STATES SHE HAS NO REFILLS AND WOULD LIKE A 90 DAY SUPPLY.      PT ALSO WOULD LIKE TO KNOW IF A ZPACK CAN BE PRESCRIBED OR DOES SHE NEED TO MAKE AN APPT.PT STATES THAT SHE IS CATCHING COLD.      PHARMACY CONFIRMED    PLEASE ADVISE PT @ [phone]

## 2020-03-20 DIAGNOSIS — E03.9 ACQUIRED HYPOTHYROIDISM: ICD-10-CM

## 2020-03-20 RX ORDER — LEVOTHYROXINE SODIUM 0.03 MG/1
25 TABLET ORAL DAILY
Qty: 90 TABLET | Refills: 3 | Status: SHIPPED | OUTPATIENT
Start: 2020-03-20 | End: 2021-05-20 | Stop reason: SDUPTHER

## 2020-03-20 RX ORDER — LEVOTHYROXINE SODIUM 0.03 MG/1
25 TABLET ORAL DAILY
Qty: 90 TABLET | Refills: 3 | Status: SHIPPED | OUTPATIENT
Start: 2020-03-20 | End: 2021-01-13 | Stop reason: SDUPTHER

## 2020-03-20 RX ORDER — AZITHROMYCIN 250 MG/1
TABLET, FILM COATED ORAL
Qty: 6 TABLET | Refills: 0 | Status: SHIPPED | OUTPATIENT
Start: 2020-03-20 | End: 2020-06-29

## 2020-03-20 NOTE — TELEPHONE ENCOUNTER
REFILL ON   levothyroxine (SYNTHROID, LEVOTHROID) 25 MCG tablet    PHARMACY CONFIRMED      PLEASE ADVISE PT @ [phone]

## 2020-04-14 RX ORDER — SERTRALINE HYDROCHLORIDE 25 MG/1
TABLET, FILM COATED ORAL
Qty: 90 TABLET | Refills: 3 | OUTPATIENT
Start: 2020-04-14

## 2020-05-06 DIAGNOSIS — M19.012 PRIMARY OSTEOARTHRITIS OF LEFT SHOULDER: ICD-10-CM

## 2020-05-06 NOTE — TELEPHONE ENCOUNTER
PT CALLED AND REQUESTING A REFILL FOR:  naproxen (NAPROSYN) 500 MG tablet    PT IS REQUESTING A 6 MONTH SUPPLY    PT STATED PHARMACY FAXED OVER INFORMATION    [id number] [place] [address] [address] AT [facility] [phone] [facility] [phone] FX    PT IS REQUESTING CONFIRMATION ON SCRIPT  [id number]

## 2020-05-08 RX ORDER — NAPROXEN 500 MG/1
500 TABLET ORAL 2 TIMES DAILY WITH MEALS
Qty: 180 TABLET | Refills: 3 | Status: SHIPPED | OUTPATIENT
Start: 2020-05-08 | End: 2021-01-13 | Stop reason: SDUPTHER

## 2020-06-29 ENCOUNTER — OFFICE VISIT (OUTPATIENT)
Dept: FAMILY MEDICINE CLINIC | Facility: CLINIC | Age: 70
End: 2020-06-29

## 2020-06-29 VITALS
OXYGEN SATURATION: 99 % | TEMPERATURE: 98.1 F | BODY MASS INDEX: 22.07 KG/M2 | SYSTOLIC BLOOD PRESSURE: 120 MMHG | DIASTOLIC BLOOD PRESSURE: 70 MMHG | HEIGHT: 69 IN | WEIGHT: 149 LBS | HEART RATE: 76 BPM

## 2020-06-29 DIAGNOSIS — M19.041 ARTHRITIS OF BOTH HANDS: Primary | ICD-10-CM

## 2020-06-29 DIAGNOSIS — M19.042 ARTHRITIS OF BOTH HANDS: Primary | ICD-10-CM

## 2020-06-29 DIAGNOSIS — M47.816 LUMBAR SPONDYLOSIS: ICD-10-CM

## 2020-06-29 DIAGNOSIS — M19.012 PRIMARY OSTEOARTHRITIS OF LEFT SHOULDER: ICD-10-CM

## 2020-06-29 DIAGNOSIS — G62.9 NEUROPATHY: ICD-10-CM

## 2020-06-29 PROCEDURE — 96372 THER/PROPH/DIAG INJ SC/IM: CPT | Performed by: PHYSICIAN ASSISTANT

## 2020-06-29 PROCEDURE — 99213 OFFICE O/P EST LOW 20 MIN: CPT | Performed by: PHYSICIAN ASSISTANT

## 2020-06-29 RX ORDER — CYANOCOBALAMIN 1000 UG/ML
1000 INJECTION, SOLUTION INTRAMUSCULAR; SUBCUTANEOUS
Status: DISCONTINUED | OUTPATIENT
Start: 2020-06-29 | End: 2021-07-13 | Stop reason: HOSPADM

## 2020-06-29 RX ORDER — AZELASTINE HYDROCHLORIDE 0.5 MG/ML
SOLUTION/ DROPS OPHTHALMIC
COMMUNITY

## 2020-06-29 RX ADMIN — CYANOCOBALAMIN 1000 MCG: 1000 INJECTION, SOLUTION INTRAMUSCULAR; SUBCUTANEOUS at 13:47

## 2020-06-29 NOTE — PROGRESS NOTES
Subjective   Marci Snyder is a 69 y.o. female  Back Pain (Follow up on back pain, req refill on Tramadol )      History of Present Illness  Patient is a pleasant 69-year-old white female who presents today for follow-up on chronic joint pain associated with degenerative joint disease and degenerative disc disease with chronic back pain.  Her joint and spine pain is currently stable on tramadol 100 mg 4 times daily.  Patient is getting most of her exercise currently from an indoor exercise bike and some hiking.  She denies adverse effects on her pain medication.  She states that she is able to carry out all of her activities of daily living with minimal pain while taking tramadol while without this medication she is very limited in her ability to do daily tasks around the house.  She states with this medication she is able to hike and ride her exercise bike 15 miles per day.  She said she continues to have joint pain especially hand pain on a daily basis but it is less than 5 out of 10 on medication and tolerable.  Patient is also due for B12 shot which significantly helps her peripheral neuropathy.  The following portions of the patient's history were reviewed and updated as appropriate: allergies, current medications, past social history and problem list    Review of Systems   Constitutional: Negative.    Respiratory: Negative.    Gastrointestinal: Negative.    Genitourinary: Negative.    Musculoskeletal: Positive for arthralgias, back pain and myalgias. Negative for gait problem and joint swelling.   Skin: Negative.    Neurological: Negative for dizziness, tremors, weakness and numbness.   Psychiatric/Behavioral: Negative.        Objective     Vitals:    06/29/20 1340   BP: 120/70   Pulse: 76   Temp: 98.1 °F (36.7 °C)   SpO2: 99%       Physical Exam   Constitutional: She is oriented to person, place, and time. She appears well-developed and well-nourished. No distress.   Musculoskeletal: She exhibits  tenderness and deformity. She exhibits no edema.   Patient has degenerative changes noted in both hands   Neurological: She is alert and oriented to person, place, and time. No sensory deficit. She exhibits normal muscle tone. Coordination normal.   Skin: Skin is warm and dry. No rash noted. She is not diaphoretic. No erythema. No pallor.   Psychiatric: She has a normal mood and affect. Her behavior is normal. Judgment and thought content normal.   Nursing note and vitals reviewed.      Assessment/Plan     Marci was seen today for back pain.    Diagnoses and all orders for this visit:    Arthritis of both hands    Primary osteoarthritis of left shoulder    Neuropathy  -     cyanocobalamin injection 1,000 mcg    Lumbar spondylosis    Prescription given for tramadol 50 mg 2 tablets 4 times daily for chronic pain over 240 with 5 refills discussed abuse potential controlled substance status of this medication peer discussed need to follow-up in 6 months for recheck.  Encourage patient continue staying active on a daily basis

## 2020-11-19 DIAGNOSIS — F32.A DEPRESSIVE DISORDER: ICD-10-CM

## 2020-11-20 RX ORDER — BUPROPION HYDROCHLORIDE 150 MG/1
TABLET ORAL
Qty: 180 TABLET | Refills: 3 | Status: SHIPPED | OUTPATIENT
Start: 2020-11-20 | End: 2021-01-13 | Stop reason: SDUPTHER

## 2021-01-13 DIAGNOSIS — M47.812 SPONDYLOSIS OF CERVICAL REGION WITHOUT MYELOPATHY OR RADICULOPATHY: ICD-10-CM

## 2021-01-13 DIAGNOSIS — M15.9 PRIMARY OSTEOARTHRITIS INVOLVING MULTIPLE JOINTS: ICD-10-CM

## 2021-01-13 DIAGNOSIS — M47.816 LUMBAR SPONDYLOSIS: ICD-10-CM

## 2021-01-13 DIAGNOSIS — M19.012 PRIMARY OSTEOARTHRITIS OF LEFT SHOULDER: ICD-10-CM

## 2021-01-13 DIAGNOSIS — E03.9 ACQUIRED HYPOTHYROIDISM: ICD-10-CM

## 2021-01-13 DIAGNOSIS — F32.A DEPRESSIVE DISORDER: ICD-10-CM

## 2021-01-13 RX ORDER — BUPROPION HYDROCHLORIDE 150 MG/1
150 TABLET ORAL 2 TIMES DAILY
Qty: 180 TABLET | Refills: 3 | Status: SHIPPED | OUTPATIENT
Start: 2021-01-13 | End: 2022-02-14 | Stop reason: SDUPTHER

## 2021-01-13 RX ORDER — TRAMADOL HYDROCHLORIDE 50 MG/1
50 TABLET ORAL EVERY 6 HOURS PRN
Qty: 120 TABLET | Refills: 5 | Status: SHIPPED | OUTPATIENT
Start: 2021-01-13 | End: 2021-01-14 | Stop reason: SDUPTHER

## 2021-01-13 RX ORDER — NAPROXEN 500 MG/1
500 TABLET ORAL 2 TIMES DAILY WITH MEALS
Qty: 180 TABLET | Refills: 3 | Status: SHIPPED | OUTPATIENT
Start: 2021-01-13 | End: 2022-01-10

## 2021-01-13 RX ORDER — LEVOTHYROXINE SODIUM 0.03 MG/1
25 TABLET ORAL DAILY
Qty: 90 TABLET | Refills: 3 | Status: SHIPPED | OUTPATIENT
Start: 2021-01-13 | End: 2021-02-17

## 2021-01-13 RX ORDER — SERTRALINE HYDROCHLORIDE 25 MG/1
25 TABLET, FILM COATED ORAL DAILY
Qty: 90 TABLET | Refills: 3 | Status: SHIPPED | OUTPATIENT
Start: 2021-01-13 | End: 2022-03-17 | Stop reason: SDUPTHER

## 2021-01-13 NOTE — TELEPHONE ENCOUNTER
Caller: Marci Snyder SHIKHA    Relationship: Self    Best call back number:[phone]  Medication needed:   Requested Prescriptions     Pending Prescriptions Disp Refills   • traMADol (ULTRAM) 50 MG tablet 120 tablet 5     Sig: Take 1 tablet by mouth Every 6 (Six) Hours As Needed for Severe Pain .   • naproxen (NAPROSYN) 500 MG tablet 180 tablet 3     Sig: Take 1 tablet by mouth 2 (Two) Times a Day With Meals.   • sertraline (ZOLOFT) 25 MG tablet 90 tablet 3     Sig: Take 1 tablet by mouth Daily.   • buPROPion XL (WELLBUTRIN XL) 150 MG 24 hr tablet 180 tablet 3     Sig: Take 1 tablet by mouth 2 (Two) Times a Day.   • levothyroxine (SYNTHROID, LEVOTHROID) 25 MCG tablet 90 tablet 3     Sig: Take 1 tablet by mouth Daily.       When do you need the refill by: ASAP    What details did the patient provide when requesting the medication:     Does the patient have less than a 3 day supply:  [x] Yes  [] No    What is the patient's preferred pharmacy:     [id number] [address]  AT [facility] [phone] [facility] [phone]

## 2021-01-14 ENCOUNTER — TELEPHONE (OUTPATIENT)
Dept: FAMILY MEDICINE CLINIC | Facility: CLINIC | Age: 71
End: 2021-01-14

## 2021-01-14 DIAGNOSIS — M47.812 SPONDYLOSIS OF CERVICAL REGION WITHOUT MYELOPATHY OR RADICULOPATHY: ICD-10-CM

## 2021-01-14 DIAGNOSIS — M47.816 LUMBAR SPONDYLOSIS: ICD-10-CM

## 2021-01-14 DIAGNOSIS — M15.9 PRIMARY OSTEOARTHRITIS INVOLVING MULTIPLE JOINTS: ICD-10-CM

## 2021-01-14 RX ORDER — TRAMADOL HYDROCHLORIDE 50 MG/1
100 TABLET ORAL EVERY 6 HOURS PRN
Qty: 240 TABLET | Refills: 5 | Status: SHIPPED | OUTPATIENT
Start: 2021-01-14 | End: 2022-03-17 | Stop reason: SDUPTHER

## 2021-01-14 NOTE — TELEPHONE ENCOUNTER
Per last note Saranya changed prescription to Tramadol 50mg 2 po QID for chronic pain on 06/29/20.

## 2021-01-14 NOTE — TELEPHONE ENCOUNTER
PT. CALLED, STATED BOB HAS CHANGED HER TRAMADOL TO INCREASE  MG.  NEEDING CALLED BACK INTO RX=[facility]/CHARLIE RIBEIRO, TO CHANGE.  (TAKEN 4/DAY).    PT. IS WANTING TO BE CALLED BACK @ [phone].

## 2021-01-27 ENCOUNTER — TELEPHONE (OUTPATIENT)
Dept: FAMILY MEDICINE CLINIC | Facility: CLINIC | Age: 71
End: 2021-01-27

## 2021-01-27 NOTE — TELEPHONE ENCOUNTER
PATIENT CALLED STATING THAT SHE WOULD LIKE TO BE CALLED AND ADVISED BY DR. ALDRICH  IF SHE WAS ABLE TO RECEIVE A 6 MONTH PRESCRIPTION FOR THE OTC MEDICATION PREVAGEN.      PATIENT WOULD LIKE TO BE CALLED AND ADVISED AS SOON AS POSSIBLE:    [phone]         PATIENT WOULD LIKE THE PRESCRIPTION FOR PREVAGEN SENT TO THE [facility] IN [place], [place] ON FILE

## 2021-01-28 NOTE — TELEPHONE ENCOUNTER
Okay, it looks like in epic there is 2 doses of 10 mg or extra strength 20 mg, see which one she wants and send a prescription to me to sign please

## 2021-02-17 ENCOUNTER — OFFICE VISIT (OUTPATIENT)
Dept: ORTHOPEDIC SURGERY | Facility: CLINIC | Age: 71
End: 2021-02-17

## 2021-02-17 ENCOUNTER — TELEPHONE (OUTPATIENT)
Dept: ORTHOPEDIC SURGERY | Facility: CLINIC | Age: 71
End: 2021-02-17

## 2021-02-17 DIAGNOSIS — S99.921A TOE INJURY, RIGHT, INITIAL ENCOUNTER: Primary | ICD-10-CM

## 2021-02-17 PROCEDURE — 99441 PR PHYS/QHP TELEPHONE EVALUATION 5-10 MIN: CPT | Performed by: PHYSICIAN ASSISTANT

## 2021-02-17 RX ORDER — CHLORPHENIRAMINE MALEATE 4 MG
4 TABLET ORAL DAILY
COMMUNITY
Start: 2020-12-07

## 2021-02-17 RX ORDER — IPRATROPIUM BROMIDE 21 UG/1
1 SPRAY, METERED NASAL 2 TIMES DAILY
COMMUNITY
Start: 2020-12-27 | End: 2021-08-13

## 2021-02-17 RX ORDER — SULFAMETHOXAZOLE AND TRIMETHOPRIM 800; 160 MG/1; MG/1
1 TABLET ORAL 2 TIMES DAILY
COMMUNITY
Start: 2021-02-15 | End: 2021-03-19

## 2021-02-17 NOTE — PROGRESS NOTES
[facility] Note    Subjective     You have chosen to receive care through a telephone visit. Do you consent to use a telephone visit for your medical care today? Yes      Patient: Marci Snyder  : 1950    Primary Care Provider: Jimbo Vuong MD    Requesting Provider: As above    Pain of the Right Foot      History    Chief Complaint: Right toe pain    History of Present Illness: Patient returns for telephone visit with a new problem.  She complains of 3rd toe pain and swelling since an injury getting her toe stuck in a laundry basket a couple of weeks ago.   Initially, she did not have any pain, however, the 3rd toe and the lateral aspect fo the 2nd toe has become erythematous and painful.  She denies any open wound, drainage.  No fever, chills.  She was put on Bactrim and given Bactroban ointment by a PA.      Current Outpatient Medications on File Prior to Visit   Medication Sig Dispense Refill   • Allergy 4 MG tablet Take  by mouth Daily.     • Apoaequorin (Prevagen Extra Strength) 20 MG capsule Take 1 tablet by mouth Daily. 30 capsule 11   • azelastine (OPTIVAR) 0.05 % ophthalmic solution azelastine 0.05 % eye drops     • baclofen (LIORESAL) 10 MG tablet Take 1 tablet by mouth Daily. 30 tablet 5   • buPROPion XL (WELLBUTRIN XL) 150 MG 24 hr tablet Take 1 tablet by mouth 2 (Two) Times a Day. 180 tablet 3   • Calcium Carbonate-Vit D-Min (CALCIUM 1200) 0191-9606 MG-UNIT chewable tablet Chew.     • cetirizine (ZyrTEC) 10 MG tablet Take 10 mg by mouth daily.     • Cholecalciferol (VITAMIN D3) 2000 UNITS tablet Take  by mouth.     • diclofenac (VOLTAREN) 1 % gel gel Apply 4 g topically to the appropriate area as directed 2 (Two) Times a Day. To hands as needed 100 g 11   • ESTRACE VAGINAL 0.1 MG/GM vaginal cream   1   • fluocinonide-emollient (LIDEX-E) 0.05 % cream Apply  topically to the appropriate area as directed 2 (Two) Times a Day. 30 g 1   • FLUZONE  HIGH-DOSE 0.5 ML suspension prefilled syringe injection inject 0.5 milliliter intramuscularly  0   • ipratropium (ATROVENT) 0.03 % nasal spray 1 spray 2 (Two) Times a Day.     • levocetirizine (XYZAL) 5 MG tablet Take 5 mg by mouth Daily.  0   • levothyroxine (SYNTHROID, LEVOTHROID) 25 MCG tablet Take 1 tablet by mouth Daily. 90 tablet 3   • loratadine (CLARITIN) 10 MG tablet Take 1 tablet by mouth Daily. 90 tablet 3   • Magnesium 100 MG tablet Take  by mouth.     • Melatonin 10 MG capsule Take 2 capsules by mouth daily.     • methocarbamol (ROBAXIN) 500 MG tablet   0   • methscopolamine (PAMINE) 2.5 MG tablet Take 1 tablet by mouth 2 (Two) Times a Day As Needed (congestion). 120 tablet 5   • montelukast (SINGULAIR) 10 MG tablet Take 1 tablet by mouth Every Night. 90 tablet 3   • mupirocin (BACTROBAN) 2 % cream Apply  topically 3 (Three) Times a Day. 15 g 2   • naproxen (NAPROSYN) 500 MG tablet Take 1 tablet by mouth 2 (Two) Times a Day With Meals. 180 tablet 3   • olopatadine (PATADAY) 0.2 % solution ophthalmic solution 1 drop daily. 1 drop in each eye ophthalmic daily     • olopatadine (PATANASE) 0.6 % solution nasal solution by Each Nare route 2 (two) times a day.     • Potassium 75 MG tablet Take  by mouth.     • sertraline (ZOLOFT) 25 MG tablet Take 1 tablet by mouth Daily. 90 tablet 3   • sulfamethoxazole-trimethoprim (BACTRIM DS,SEPTRA DS) 800-160 MG per tablet Take 1 tablet by mouth 2 (Two) Times a Day.     • terbinafine (LamiSIL) 250 MG tablet Take 250 mg by mouth daily.     • traMADol (ULTRAM) 50 MG tablet Take 2 tablets by mouth Every 6 (Six) Hours As Needed for Severe Pain . 240 tablet 5     Current Facility-Administered Medications on File Prior to Visit   Medication Dose Route Frequency Provider Last Rate Last Admin   • cyanocobalamin injection 1,000 mcg  1,000 mcg Intramuscular Q28 Days Saranya Terrell, KRISH   1,000 mcg at 10/08/19 1648   • cyanocobalamin injection 1,000 mcg  1,000 mcg  Intramuscular Q28 Days Saranya Terrell PA-C   1,000 mcg at 01/31/20 1454   • cyanocobalamin injection 1,000 mcg  1,000 mcg Intramuscular Q28 Days Saranya Terrell PA-C   1,000 mcg at 06/29/20 1347      Allergies   Allergen Reactions   • Penicillins       Past Medical History:   Diagnosis Date   • Allergic rhinitis    • Benign paroxysmal positional vertigo    • Bursitis/tendonitis, shoulder    • Chronic nonsuppurative otitis media    • Depressive disorder    • Fatigue    • Headache, cervicogenic    • Hypothyroidism    • Insomnia    • Myalgia    • Nail fungus    • Nervousness    • OA (osteoarthritis) of shoulder    • CHIQUITA (obstructive sleep apnea)    • Osteoporosis    • Preventive measure    • Sciatica of right side    • TSH (thyroid-stimulating hormone deficiency)    • Vitamin D deficiency      Past Surgical History:   Procedure Laterality Date   • FOOT SURGERY Right     pinched nerve   • FOOT SURGERY Left 01/17/2019    1st MPJ fusion - Dr. Gwendolyn Del Rosario ; [facility]    • NOSE SURGERY  1985     Family History   Problem Relation Age of Onset   • Stroke Mother    • Cancer Father    • Breast cancer Sister 73   • Breast cancer Paternal Aunt         DX AGE UNKNOWN   • Breast cancer Cousin         SEVERAL COUSINS - DX AGES UNKNOWN   • Breast cancer Paternal Aunt         DX AGE UNKNOWN   • Ovarian cancer Neg Hx       Social History     Socioeconomic History   • Marital status:      Spouse name: Not on file   • Number of children: Not on file   • Years of education: Not on file   • Highest education level: Not on file   Tobacco Use   • Smoking status: Never Smoker   • Smokeless tobacco: Never Used   Substance and Sexual Activity   • Alcohol use: Yes     Comment: social   • Drug use: No   • Sexual activity: Yes        Review of Systems   Constitutional: Negative.    HENT: Negative.    Eyes: Negative.    Respiratory: Negative.    Cardiovascular: Negative.    Gastrointestinal: Negative.     Endocrine: Negative.    Genitourinary: Negative.    Musculoskeletal: Positive for arthralgias.   Skin: Negative.    Allergic/Immunologic: Negative.    Neurological: Negative.    Hematological: Negative.    Psychiatric/Behavioral: Negative.        The following portions of the patient's history were reviewed and updated as appropriate: allergies, current medications, past family history, past medical history, past social history, past surgical history and problem list.      Objective      Physical Exam  LMP  (LMP Unknown)     There is no height or weight on file to calculate BMI.    No physical exam secondary to telephone encounter.    Medical Decision Making    Data Review:   none    Assessment:  1. Toe injury, right, initial encounter        Plan:  Right toe injury with concern for infection.  I reviewed the pictures the patient texted to us.  She reports injury a couple of weeks ago and then the toe becoming red.  It is difficult to determine whether the toe is infected or bruised based on the photos.  I tried to get the patient to come in to see us tomorrow or Friday, however, she reports given the weather she will not be able to make it.  She will continue with Bactrim and Bactroban.  Should the problem persist or worsen, she will go to the ER.    This visit has been rescheduled as a phone visit to comply with patient safety concerns in accordance with CDC recommendations. Total time of discussion was 8 minutes.        Stephanie Schaefer PA-C  02/18/21  13:59 EST

## 2021-02-17 NOTE — TELEPHONE ENCOUNTER
PATIENT CALLED STATING THAT SHE IS HAVING RIGHT FOOT PAIN AGAIN. SHE LAST SAW JERMAINE ON 01/08/2020. SHE WOULD LIKE TO HAVE A TELEHEALTH WITH JERMAINE OR NEHA AS SOON AS POSSIBLE. SHE CAN BE REACHED [phone]

## 2021-02-17 NOTE — TELEPHONE ENCOUNTER
Stephanie,    Please see below and advise.  It has been a year since we've seen the patient.    Thanks!    Erasmo

## 2021-02-18 ENCOUNTER — TELEPHONE (OUTPATIENT)
Dept: ORTHOPEDIC SURGERY | Facility: CLINIC | Age: 71
End: 2021-02-18

## 2021-02-18 NOTE — TELEPHONE ENCOUNTER
I spoke with the patient and explained to her that I think she needs to have this toe looked at.  I cannot determine what is going on based on the photos we have.  She reports that she has her COVID vaccine at [facility] tomorrow and will go to the ER at the hospital after her vaccination.  She will call here after her visit and let us know what they diagnosed.

## 2021-02-19 ENCOUNTER — TELEPHONE (OUTPATIENT)
Dept: ORTHOPEDIC SURGERY | Facility: CLINIC | Age: 71
End: 2021-02-19

## 2021-02-19 NOTE — TELEPHONE ENCOUNTER
Provider: NEHA MULTANI PA-C    Caller: LAURIE VEE    Relationship to Patient: SELF    Pharmacy:  [facility] [phone]    Phone Number: [phone]    Reason for Call: INFORM OF ER VISIT TODAY TO [facility] IN [place], [place]. TREATED FOR CELLULITIS OF RIGHT 3RD TOE. SAYS LABS NORMAL BUT BLOOD CULTURES PENDING. SKIN IS NOT BROKEN. ADVISED TO FINISH BACTRIM DS ANTIBIOTIC AND ER IS CALLING IN CLINDAMYCIN 300 MG, PO TID TODAY. PATIENT ASKED IF NEHA MULTANI WOULD CALL HER?

## 2021-02-22 ENCOUNTER — TELEPHONE (OUTPATIENT)
Dept: ORTHOPEDIC SURGERY | Facility: CLINIC | Age: 71
End: 2021-02-22

## 2021-02-22 NOTE — TELEPHONE ENCOUNTER
PATIENT CALLED CONCERNING TELEVISIT TOMORROW WITH NEHA MULTANI.  SHE WANTED TO CONFIRM WHEN AND WHERE SHE HAD X-RAYS AND ER VISIT IN CASE THESE WERE NEEDED PRIOR TO TELEVISIT.    X-RAY PERFORMED 2-8-2021 AT:  [facility]  [phone]    2-  [facility] ER VISIT  [phone]

## 2021-02-23 ENCOUNTER — OFFICE VISIT (OUTPATIENT)
Dept: ORTHOPEDIC SURGERY | Facility: CLINIC | Age: 71
End: 2021-02-23

## 2021-02-23 DIAGNOSIS — S99.921D TOE INJURY, RIGHT, SUBSEQUENT ENCOUNTER: Primary | ICD-10-CM

## 2021-02-23 DIAGNOSIS — M79.671 FOOT PAIN, RIGHT: ICD-10-CM

## 2021-02-23 PROCEDURE — 99441 PR PHYS/QHP TELEPHONE EVALUATION 5-10 MIN: CPT | Performed by: PHYSICIAN ASSISTANT

## 2021-02-23 NOTE — PROGRESS NOTES
[facility] Note    Subjective     Patient: Marci Snyder  : 1950    Primary Care Provider: Jimbo Vuong MD    Requesting Provider: As above    Follow-up (4 day follow up Toe injury, right)        You have chosen to receive care through a telephone visit. Do you consent to use a telephone visit for your medical care today? Yes  History    Chief Complaint: Right toe pain    History of Present Illness: Patient presents today for telephone visit regarding her right third toe pain and redness.  She reports she did go to the emergency room as I recommended and they have put her on clindamycin.  She finished her Bactrim.  She has been on it for 4 days and is concerned that there is been no change in the toe.  It continues to have redness.  There are no streaks.  She denies any constitutional symptoms.  She reports some redness on the lateral aspect of the second toe as well.    Current Outpatient Medications on File Prior to Visit   Medication Sig Dispense Refill   • Allergy 4 MG tablet Take  by mouth Daily.     • Apoaequorin (Prevagen Extra Strength) 20 MG capsule Take 1 tablet by mouth Daily. 30 capsule 11   • azelastine (OPTIVAR) 0.05 % ophthalmic solution azelastine 0.05 % eye drops     • baclofen (LIORESAL) 10 MG tablet Take 1 tablet by mouth Daily. 30 tablet 5   • buPROPion XL (WELLBUTRIN XL) 150 MG 24 hr tablet Take 1 tablet by mouth 2 (Two) Times a Day. 180 tablet 3   • Calcium Carbonate-Vit D-Min (CALCIUM 1200) 2658-2995 MG-UNIT chewable tablet Chew.     • cetirizine (ZyrTEC) 10 MG tablet Take 10 mg by mouth daily.     • Cholecalciferol (VITAMIN D3) 2000 UNITS tablet Take  by mouth.     • diclofenac (VOLTAREN) 1 % gel gel Apply 4 g topically to the appropriate area as directed 2 (Two) Times a Day. To hands as needed 100 g 11   • ESTRACE VAGINAL 0.1 MG/GM vaginal cream   1   • fluocinonide-emollient (LIDEX-E) 0.05 % cream Apply  topically to the appropriate  area as directed 2 (Two) Times a Day. 30 g 1   • FLUZONE HIGH-DOSE 0.5 ML suspension prefilled syringe injection inject 0.5 milliliter intramuscularly  0   • ipratropium (ATROVENT) 0.03 % nasal spray 1 spray 2 (Two) Times a Day.     • levocetirizine (XYZAL) 5 MG tablet Take 5 mg by mouth Daily.  0   • levothyroxine (SYNTHROID, LEVOTHROID) 25 MCG tablet Take 1 tablet by mouth Daily. 90 tablet 3   • loratadine (CLARITIN) 10 MG tablet Take 1 tablet by mouth Daily. 90 tablet 3   • Magnesium 100 MG tablet Take  by mouth.     • Melatonin 10 MG capsule Take 2 capsules by mouth daily.     • methocarbamol (ROBAXIN) 500 MG tablet   0   • methscopolamine (PAMINE) 2.5 MG tablet Take 1 tablet by mouth 2 (Two) Times a Day As Needed (congestion). 120 tablet 5   • montelukast (SINGULAIR) 10 MG tablet Take 1 tablet by mouth Every Night. 90 tablet 3   • mupirocin (BACTROBAN) 2 % cream Apply  topically 3 (Three) Times a Day. 15 g 2   • naproxen (NAPROSYN) 500 MG tablet Take 1 tablet by mouth 2 (Two) Times a Day With Meals. 180 tablet 3   • olopatadine (PATADAY) 0.2 % solution ophthalmic solution 1 drop daily. 1 drop in each eye ophthalmic daily     • olopatadine (PATANASE) 0.6 % solution nasal solution by Each Nare route 2 (two) times a day.     • Potassium 75 MG tablet Take  by mouth.     • sertraline (ZOLOFT) 25 MG tablet Take 1 tablet by mouth Daily. 90 tablet 3   • sulfamethoxazole-trimethoprim (BACTRIM DS,SEPTRA DS) 800-160 MG per tablet Take 1 tablet by mouth 2 (Two) Times a Day.     • terbinafine (LamiSIL) 250 MG tablet Take 250 mg by mouth daily.     • traMADol (ULTRAM) 50 MG tablet Take 2 tablets by mouth Every 6 (Six) Hours As Needed for Severe Pain . 240 tablet 5     Current Facility-Administered Medications on File Prior to Visit   Medication Dose Route Frequency Provider Last Rate Last Admin   • cyanocobalamin injection 1,000 mcg  1,000 mcg Intramuscular Q28 Days Saranya Terrell PA-C   1,000 mcg at 10/08/19 1648   •  cyanocobalamin injection 1,000 mcg  1,000 mcg Intramuscular Q28 Days Saranya Terrell., PA-C   1,000 mcg at 01/31/20 1454   • cyanocobalamin injection 1,000 mcg  1,000 mcg Intramuscular Q28 Days [place], Saranya N., PA-C   1,000 mcg at 06/29/20 1347      Allergies   Allergen Reactions   • Penicillins       Past Medical History:   Diagnosis Date   • Allergic rhinitis    • Benign paroxysmal positional vertigo    • Bursitis/tendonitis, shoulder    • Chronic nonsuppurative otitis media    • Depressive disorder    • Fatigue    • Headache, cervicogenic    • Hypothyroidism    • Insomnia    • Myalgia    • Nail fungus    • Nervousness    • OA (osteoarthritis) of shoulder    • CHIQUITA (obstructive sleep apnea)    • Osteoporosis    • Preventive measure    • Sciatica of right side    • TSH (thyroid-stimulating hormone deficiency)    • Vitamin D deficiency      Past Surgical History:   Procedure Laterality Date   • FOOT SURGERY Right     pinched nerve   • FOOT SURGERY Left 01/17/2019    1st MP fusion - Dr. Gwendolyn Del Rosario ; [facility]    • NOSE SURGERY  1985     Family History   Problem Relation Age of Onset   • Stroke Mother    • Cancer Father    • Breast cancer Sister 73   • Breast cancer Paternal Aunt         DX AGE UNKNOWN   • Breast cancer Cousin         SEVERAL COUSINS - DX AGES UNKNOWN   • Breast cancer Paternal Aunt         DX AGE UNKNOWN   • Ovarian cancer Neg Hx       Social History     Socioeconomic History   • Marital status:      Spouse name: Not on file   • Number of children: Not on file   • Years of education: Not on file   • Highest education level: Not on file   Tobacco Use   • Smoking status: Never Smoker   • Smokeless tobacco: Never Used   Substance and Sexual Activity   • Alcohol use: Yes     Comment: social   • Drug use: No   • Sexual activity: Yes        Review of Systems   Constitutional: Negative.    HENT: Negative.    Eyes: Negative.    Respiratory: Negative.    Cardiovascular:  Negative.    Gastrointestinal: Negative.    Endocrine: Negative.    Genitourinary: Negative.    Musculoskeletal: Positive for arthralgias.   Skin: Negative.    Allergic/Immunologic: Negative.    Neurological: Negative.    Hematological: Negative.    Psychiatric/Behavioral: Negative.        The following portions of the patient's history were reviewed and updated as appropriate: allergies, current medications, past family history, past medical history, past social history, past surgical history and problem list.      Objective      Physical Exam  LMP  (LMP Unknown)     There is no height or weight on file to calculate BMI.    No physical exam secondary to telephone encounter  Medical Decision Making    Data Review:   none    Assessment:  1. Toe injury, right, initial encounter        Plan:  Right toe injury and pain with concern for infection.  As above, patient is concerned that her toe has not changed since she has been on a round of Bactrim as well as clindamycin.  I explained to her that I cannot really tell by the picture she has she sent me whether this is trauma or infection.  She I am happy to see her in clinic for evaluation.  Plan today is to get her scheduled to be seen on 2/25/2021.  She will continue the antibiotics as prescribed and return to the emergency room if indicated.      This visit has been rescheduled as a phone visit to comply with patient safety concerns in accordance with CDC recommendations. Total time of discussion was 5 minutes.        Stephanie Schaefer PA-C  02/26/21  08:30 EST

## 2021-02-25 ENCOUNTER — OFFICE VISIT (OUTPATIENT)
Dept: ORTHOPEDIC SURGERY | Facility: CLINIC | Age: 71
End: 2021-02-25

## 2021-02-25 VITALS — WEIGHT: 152.6 LBS | BODY MASS INDEX: 22.6 KG/M2 | OXYGEN SATURATION: 99 % | HEIGHT: 69 IN | HEART RATE: 71 BPM

## 2021-02-25 DIAGNOSIS — M79.674 PAIN IN RIGHT TOE(S): Primary | ICD-10-CM

## 2021-02-25 DIAGNOSIS — S99.921A TOE INJURY, RIGHT, INITIAL ENCOUNTER: ICD-10-CM

## 2021-02-25 PROCEDURE — 99212 OFFICE O/P EST SF 10 MIN: CPT | Performed by: PHYSICIAN ASSISTANT

## 2021-02-25 NOTE — PROGRESS NOTES
[facility]    Subjective     Patient: Marci Snyder  : 1950    Primary Care Provider: Jimbo Vuong MD    Requesting Provider: As above    Follow-up (2 day f/u--Toe injury, right)      History    Chief Complaint: right toe pain/injury    History of Present Illness: Patient returns today for in person visit for her right 3rd toe.  She is concerned that since being on both Bactrim and clindamycin that the toe has not changed.  She did have trauma to it one month ago.  It has not worsened.  She is here for our opinion.    Current Outpatient Medications on File Prior to Visit   Medication Sig Dispense Refill   • Allergy 4 MG tablet Take  by mouth Daily.     • Apoaequorin (Prevagen Extra Strength) 20 MG capsule Take 1 tablet by mouth Daily. 30 capsule 11   • azelastine (OPTIVAR) 0.05 % ophthalmic solution azelastine 0.05 % eye drops     • baclofen (LIORESAL) 10 MG tablet Take 1 tablet by mouth Daily. 30 tablet 5   • buPROPion XL (WELLBUTRIN XL) 150 MG 24 hr tablet Take 1 tablet by mouth 2 (Two) Times a Day. 180 tablet 3   • Calcium Carbonate-Vit D-Min (CALCIUM 1200) 7743-9681 MG-UNIT chewable tablet Chew.     • cetirizine (ZyrTEC) 10 MG tablet Take 10 mg by mouth daily.     • Cholecalciferol (VITAMIN D3) 2000 UNITS tablet Take  by mouth.     • diclofenac (VOLTAREN) 1 % gel gel Apply 4 g topically to the appropriate area as directed 2 (Two) Times a Day. To hands as needed 100 g 11   • ESTRACE VAGINAL 0.1 MG/GM vaginal cream   1   • fluocinonide-emollient (LIDEX-E) 0.05 % cream Apply  topically to the appropriate area as directed 2 (Two) Times a Day. 30 g 1   • FLUZONE HIGH-DOSE 0.5 ML suspension prefilled syringe injection inject 0.5 milliliter intramuscularly  0   • ipratropium (ATROVENT) 0.03 % nasal spray 1 spray 2 (Two) Times a Day.     • levocetirizine (XYZAL) 5 MG tablet Take 5 mg by mouth Daily.  0   • levothyroxine (SYNTHROID, LEVOTHROID) 25 MCG tablet  Take 1 tablet by mouth Daily. 90 tablet 3   • loratadine (CLARITIN) 10 MG tablet Take 1 tablet by mouth Daily. 90 tablet 3   • Magnesium 100 MG tablet Take  by mouth.     • Melatonin 10 MG capsule Take 2 capsules by mouth daily.     • methocarbamol (ROBAXIN) 500 MG tablet   0   • methscopolamine (PAMINE) 2.5 MG tablet Take 1 tablet by mouth 2 (Two) Times a Day As Needed (congestion). 120 tablet 5   • montelukast (SINGULAIR) 10 MG tablet Take 1 tablet by mouth Every Night. 90 tablet 3   • mupirocin (BACTROBAN) 2 % cream Apply  topically 3 (Three) Times a Day. 15 g 2   • naproxen (NAPROSYN) 500 MG tablet Take 1 tablet by mouth 2 (Two) Times a Day With Meals. 180 tablet 3   • olopatadine (PATADAY) 0.2 % solution ophthalmic solution 1 drop daily. 1 drop in each eye ophthalmic daily     • olopatadine (PATANASE) 0.6 % solution nasal solution by Each Nare route 2 (two) times a day.     • Potassium 75 MG tablet Take  by mouth.     • sertraline (ZOLOFT) 25 MG tablet Take 1 tablet by mouth Daily. 90 tablet 3   • sulfamethoxazole-trimethoprim (BACTRIM DS,SEPTRA DS) 800-160 MG per tablet Take 1 tablet by mouth 2 (Two) Times a Day.     • terbinafine (LamiSIL) 250 MG tablet Take 250 mg by mouth daily.     • traMADol (ULTRAM) 50 MG tablet Take 2 tablets by mouth Every 6 (Six) Hours As Needed for Severe Pain . 240 tablet 5     Current Facility-Administered Medications on File Prior to Visit   Medication Dose Route Frequency Provider Last Rate Last Admin   • cyanocobalamin injection 1,000 mcg  1,000 mcg Intramuscular Q28 Days Saranya Terrell PA-C   1,000 mcg at 10/08/19 1648   • cyanocobalamin injection 1,000 mcg  1,000 mcg Intramuscular Q28 Days Saranya Terrell PA-C   1,000 mcg at 01/31/20 1454   • cyanocobalamin injection 1,000 mcg  1,000 mcg Intramuscular Q28 Days Saranya Terrell PA-C   1,000 mcg at 06/29/20 1347      Allergies   Allergen Reactions   • Penicillins       Past Medical History:   Diagnosis Date   •  Allergic rhinitis    • Benign paroxysmal positional vertigo    • Bursitis/tendonitis, shoulder    • Chronic nonsuppurative otitis media    • Depressive disorder    • Fatigue    • Headache, cervicogenic    • Hypothyroidism    • Insomnia    • Myalgia    • Nail fungus    • Nervousness    • OA (osteoarthritis) of shoulder    • CHIQUITA (obstructive sleep apnea)    • Osteoporosis    • Preventive measure    • Sciatica of right side    • TSH (thyroid-stimulating hormone deficiency)    • Vitamin D deficiency      Past Surgical History:   Procedure Laterality Date   • FOOT SURGERY Right     pinched nerve   • FOOT SURGERY Left 01/17/2019    1st MPJ fusion - Dr. Gwendolyn Del Rosario ; [facility]    • NOSE SURGERY  1985     Family History   Problem Relation Age of Onset   • Stroke Mother    • Cancer Father    • Breast cancer Sister 73   • Breast cancer Paternal Aunt         DX AGE UNKNOWN   • Breast cancer Cousin         SEVERAL COUSINS - DX AGES UNKNOWN   • Breast cancer Paternal Aunt         DX AGE UNKNOWN   • Ovarian cancer Neg Hx       Social History     Socioeconomic History   • Marital status:      Spouse name: Not on file   • Number of children: Not on file   • Years of education: Not on file   • Highest education level: Not on file   Tobacco Use   • Smoking status: Never Smoker   • Smokeless tobacco: Never Used   Substance and Sexual Activity   • Alcohol use: Yes     Comment: social   • Drug use: No   • Sexual activity: Yes        Review of Systems   Constitutional: Negative.    HENT: Negative.    Eyes: Negative.    Respiratory: Negative.    Cardiovascular: Negative.    Gastrointestinal: Negative.    Endocrine: Negative.    Genitourinary: Negative.    Musculoskeletal: Positive for arthralgias.   Skin: Negative.    Allergic/Immunologic: Negative.    Neurological: Negative.    Hematological: Negative.    Psychiatric/Behavioral: Negative.        The following portions of the patient's history were reviewed and  "updated as appropriate: allergies, current medications, past family history, past medical history, past social history, past surgical history and problem list.      Objective      Physical Exam  Pulse 71   Ht 174 cm (68.5\")   Wt 69.2 kg (152 lb 9.6 oz)   LMP  (LMP Unknown)   SpO2 99%   BMI 22.86 kg/m²     Body mass index is 22.86 kg/m².    Patient is well developed, well nourished and in no acute distress.  Alert and oriented x 3.    Ortho Exam  V:  Dorsalis Pedis:  Right: 2+;     Posterior Tibial: Right:2+;     Capillary Refill:  Brisk  MSK:      Tibia:  Right:  non tender;    Ankle:  Right: non tender, ROM  normal and motor function  normal;     Foot:  Right:  right DIPJ of the 3rd toe looks ecchymotic.  There is no real erythema or sign of infection.  Tender at the distal phalanx. Lateral aspect of the 2nd toe also ecchymotic.;       NEURO:         Medical Decision Making    Data Review:   ordered and reviewed x-rays today    Assessment:  1. Pain in right toe(s)    2. Toe injury, right, initial encounter        Plan:  Right toe pain secondary to injury.  I reviewed x-rays and clinical findings with the patient.  X-rays show no evidence of clear fracture.  I reassured that patient that I don't think this toe is infected.  I think the discoloration is from trauma.  She has had no red streaks, constitutional symptoms.  Recommendation is that she finish the antibiotics.  Within a couple of days, if this is infection, she will notice progression, red streaks etc.  She will return with any concerns.    History, diagnosis and treatment plan discussed with Dr. Mendoza.        Stephanie Schaefer PA-C  02/27/21  08:22 EST    "

## 2021-03-11 ENCOUNTER — TELEPHONE (OUTPATIENT)
Dept: FAMILY MEDICINE CLINIC | Facility: CLINIC | Age: 71
End: 2021-03-11

## 2021-03-11 NOTE — TELEPHONE ENCOUNTER
Patient said that she has and infection on one of her toes on right foot, hurts a lot at night. She has been to the ER & podiatrist and was give antibiotics but it has not healed at all. Wanted to know if she can be work in tomorrow to be seen by LUCILLE or Dr Vuong.

## 2021-03-12 ENCOUNTER — TELEPHONE (OUTPATIENT)
Dept: ORTHOPEDIC SURGERY | Facility: CLINIC | Age: 71
End: 2021-03-12

## 2021-03-12 NOTE — TELEPHONE ENCOUNTER
PATIENT CALLED STATING THAT SHE IN IS THE EMERGENCY ROOM IN [place], [place]. PATIENT STATED THAT HER FOOT IS GETTING WORSE AND WANTS SOMEONE TO CALL HER, SHE CAN BE REACHED [phone].

## 2021-03-12 NOTE — TELEPHONE ENCOUNTER
Patient contacted her ortho specialist and made an appointment to see them. She is currently in the ER now to have her toe evaluated

## 2021-03-12 NOTE — TELEPHONE ENCOUNTER
I called and talked to Mrs. Snyder. She is at the er now. I told her that I can get her in to see Stephanie on Tuesday at 3:20.  She also sent me pictures. She said that her toe started to get worse once she hit her toe on her husbands car door after she seen Stephanie on 2-25-21.               Ariana

## 2021-03-16 ENCOUNTER — TELEPHONE (OUTPATIENT)
Dept: ORTHOPEDIC SURGERY | Facility: CLINIC | Age: 71
End: 2021-03-16

## 2021-03-16 NOTE — TELEPHONE ENCOUNTER
I called Mrs. Snyder to see if she canceled her appointment for today. She said that she is waiting to see if her toe will get better. She is using the supplies that the wound care center gave her. She said that she will call and cancel her appointment if her toe gets better.     Ariana

## 2021-03-19 ENCOUNTER — OFFICE VISIT (OUTPATIENT)
Dept: FAMILY MEDICINE CLINIC | Facility: CLINIC | Age: 71
End: 2021-03-19

## 2021-03-19 VITALS
OXYGEN SATURATION: 99 % | BODY MASS INDEX: 23.11 KG/M2 | DIASTOLIC BLOOD PRESSURE: 60 MMHG | HEART RATE: 70 BPM | HEIGHT: 69 IN | WEIGHT: 156 LBS | TEMPERATURE: 97.2 F | SYSTOLIC BLOOD PRESSURE: 120 MMHG

## 2021-03-19 DIAGNOSIS — L03.031 CELLULITIS OF TOE OF RIGHT FOOT: Primary | ICD-10-CM

## 2021-03-19 DIAGNOSIS — R10.84 GENERALIZED ABDOMINAL PAIN: ICD-10-CM

## 2021-03-19 DIAGNOSIS — G62.9 NEUROPATHY: ICD-10-CM

## 2021-03-19 PROCEDURE — 99213 OFFICE O/P EST LOW 20 MIN: CPT | Performed by: PHYSICIAN ASSISTANT

## 2021-03-19 PROCEDURE — 96372 THER/PROPH/DIAG INJ SC/IM: CPT | Performed by: PHYSICIAN ASSISTANT

## 2021-03-19 RX ORDER — CYANOCOBALAMIN 1000 UG/ML
1000 INJECTION, SOLUTION INTRAMUSCULAR; SUBCUTANEOUS ONCE
Status: COMPLETED | OUTPATIENT
Start: 2021-03-19 | End: 2021-03-19

## 2021-03-19 RX ORDER — MAGNESIUM HYDROXIDE 1200 MG/15ML
60 LIQUID ORAL 2 TIMES DAILY
Qty: 1000 ML | Refills: 2 | Status: SHIPPED | OUTPATIENT
Start: 2021-03-19 | End: 2021-07-13 | Stop reason: HOSPADM

## 2021-03-19 RX ORDER — CLINDAMYCIN HYDROCHLORIDE 300 MG/1
CAPSULE ORAL
COMMUNITY
Start: 2021-03-12 | End: 2021-03-19 | Stop reason: ALTCHOICE

## 2021-03-19 RX ORDER — DOXYCYCLINE 100 MG/1
100 CAPSULE ORAL 2 TIMES DAILY
Qty: 20 CAPSULE | Refills: 0 | Status: SHIPPED | OUTPATIENT
Start: 2021-03-19 | End: 2021-05-20

## 2021-03-19 RX ADMIN — CYANOCOBALAMIN 1000 MCG: 1000 INJECTION, SOLUTION INTRAMUSCULAR; SUBCUTANEOUS at 14:24

## 2021-03-19 NOTE — PROGRESS NOTES
Subjective   Marci Snyder is a 70 y.o. female  B12 Injection (req vitamin b12 injection) and Toe Injury (follow up on right foot toe injury, currently sees ortho and podiatrist)      History of Present Illness  Patient is a very pleasant 70-year-old white female who comes in today for follow-up on neuropathy managed with B12 injections, due for B12 shot also for evaluation of uncontrolled pain in toe on right foot following trauma with secondary cellulitis.  Patient has been seen recently by podiatry and orthopedics she states that she is currently on clindamycin which is causing her GI distress and she is concerned because she feels that her toe still looks red.  She states it is improving but does not look like it is healing to her.  The following portions of the patient's history were reviewed and updated as appropriate: allergies, current medications, past social history and problem list    Review of Systems   Constitutional: Negative for fever.   Respiratory: Negative.    Cardiovascular: Negative.    Gastrointestinal: Positive for abdominal pain ( In association with clindamycin). Negative for diarrhea.   Musculoskeletal: Negative for arthralgias.   Skin: Positive for color change and wound. Negative for pallor and rash.   Allergic/Immunologic: Negative for immunocompromised state.   Neurological: Positive for numbness.   Hematological: Negative.        Objective     Vitals:    03/19/21 1408   BP: 120/60   Pulse: 70   Temp: 97.2 °F (36.2 °C)   SpO2: 99%       Physical Exam  Vitals and nursing note reviewed.   Constitutional:       General: She is not in acute distress.     Appearance: Normal appearance. She is well-developed and normal weight. She is not ill-appearing, toxic-appearing or diaphoretic.   HENT:      Head: Normocephalic and atraumatic.   Cardiovascular:      Pulses: Normal pulses.   Pulmonary:      Effort: Pulmonary effort is normal. No respiratory distress.   Musculoskeletal:          General: Tenderness present. No deformity.   Skin:     General: Skin is warm and dry.      Coloration: Skin is not pale.      Findings: Erythema present. No rash.      Comments: Examination of third digit right foot erythematous to DIP joint from toenail has erythema surrounding cuticle, no drainage, trauma to nail noted.  Neurovascular status intact.   Neurological:      Mental Status: She is alert and oriented to person, place, and time.      Sensory: Sensory deficit present.      Motor: No abnormal muscle tone.      Coordination: Coordination normal.   Psychiatric:         Mood and Affect: Mood normal.         Behavior: Behavior normal.         Thought Content: Thought content normal.         Judgment: Judgment normal.         Assessment/Plan     Diagnoses and all orders for this visit:    1. Cellulitis of toe of right foot (Primary)    2. Neuropathy  -     cyanocobalamin injection 1,000 mcg    3. Generalized abdominal pain    Other orders  -     doxycycline (Monodox) 100 MG capsule; Take 1 capsule by mouth 2 (Two) Times a Day. For toe infection  Dispense: 20 capsule; Refill: 0  -     sodium chloride (NS) 0.9 % irrigation; Irrigate with 60 mL to the affected area as directed by provider 2 (two) times a day.  Dispense: 1000 mL; Refill: 2    Advised patient to discontinue clindamycin due to GI distress and abdominal pain with that, switch to Monodox new prescription given if GI symptoms persist follow-up for further evaluation and treatment, notify patient to let me know if she develops any diarrhea.  She will continue following up with her orthopedic foot specialist, instructed her in cleaning the area on her toenail twice daily with sterile saline, B12 injection given.

## 2021-05-19 ENCOUNTER — OFFICE VISIT (OUTPATIENT)
Dept: ORTHOPEDIC SURGERY | Facility: CLINIC | Age: 71
End: 2021-05-19

## 2021-05-19 VITALS
DIASTOLIC BLOOD PRESSURE: 64 MMHG | WEIGHT: 150.8 LBS | HEART RATE: 82 BPM | SYSTOLIC BLOOD PRESSURE: 151 MMHG | BODY MASS INDEX: 22.33 KG/M2 | HEIGHT: 69 IN

## 2021-05-19 DIAGNOSIS — S99.921D TOE INJURY, RIGHT, SUBSEQUENT ENCOUNTER: Primary | ICD-10-CM

## 2021-05-19 PROCEDURE — 99214 OFFICE O/P EST MOD 30 MIN: CPT | Performed by: ORTHOPAEDIC SURGERY

## 2021-05-19 RX ORDER — MAGNESIUM OXIDE 400 MG/1
400 TABLET ORAL
COMMUNITY
End: 2021-08-13

## 2021-05-19 NOTE — PROGRESS NOTES
ESTABLISHED PATIENT    Patient: Marci Snyder  : 1950    Primary Care Provider: Jimbo Vuong MD    Requesting Provider: As above    Follow-up (Pain in right toe(s) (Discoloration in 3rd toe) )      History    Chief Complaint: Right third toe problem    History of Present Illness: Ms. Snyder returns noting that she jammed her right third toe on a laundry basket in February.  She was seen by Ms. Schaefer and by 2 ERs, no fractures were identified.  She reports that the ER in [place] thought she might have an infection and did lab work and some type of scan.  She was seen there about 4 to 6 weeks ago, we will try to obtain the records.  She has had several courses of oral antibiotics.  She does have neuropathy secondary to B12 deficiency.  She is here because she still has a purplish discoloration of the third toe.  She notes the coloration is worse by the end of the day.  She does not have pain .  She has noted a small scab that persists on the tip of the toe.  No other injuries, no fever no chills.    Current Outpatient Medications on File Prior to Visit   Medication Sig Dispense Refill   • Allergy 4 MG tablet Take  by mouth Daily.     • Apoaequorin (Prevagen Extra Strength) 20 MG capsule Take 1 tablet by mouth Daily. 30 capsule 11   • azelastine (OPTIVAR) 0.05 % ophthalmic solution azelastine 0.05 % eye drops     • baclofen (LIORESAL) 10 MG tablet Take 1 tablet by mouth Daily. 30 tablet 5   • buPROPion XL (WELLBUTRIN XL) 150 MG 24 hr tablet Take 1 tablet by mouth 2 (Two) Times a Day. 180 tablet 3   • Calcium Carbonate-Vit D-Min (CALCIUM 1200) 7714-0094 MG-UNIT chewable tablet Chew.     • Calcium Carbonate-Vitamin D 600-200 MG-UNIT tablet Take 1 tablet by mouth.     • cetirizine (ZyrTEC) 10 MG tablet Take 10 mg by mouth daily.     • Cholecalciferol (VITAMIN D3) 2000 UNITS tablet Take  by mouth.     • diclofenac (VOLTAREN) 1 % gel gel Apply 4 g topically to the appropriate area as  directed 2 (Two) Times a Day. To hands as needed 100 g 11   • doxycycline (Monodox) 100 MG capsule Take 1 capsule by mouth 2 (Two) Times a Day. For toe infection 20 capsule 0   • ESTRACE VAGINAL 0.1 MG/GM vaginal cream   1   • fluocinonide-emollient (LIDEX-E) 0.05 % cream Apply  topically to the appropriate area as directed 2 (Two) Times a Day. 30 g 1   • FLUZONE HIGH-DOSE 0.5 ML suspension prefilled syringe injection inject 0.5 milliliter intramuscularly  0   • ipratropium (ATROVENT) 0.03 % nasal spray 1 spray 2 (Two) Times a Day.     • levocetirizine (XYZAL) 5 MG tablet Take 5 mg by mouth Daily.  0   • levothyroxine (SYNTHROID, LEVOTHROID) 25 MCG tablet Take 1 tablet by mouth Daily. 90 tablet 3   • loratadine (CLARITIN) 10 MG tablet Take 1 tablet by mouth Daily. 90 tablet 3   • Magnesium 100 MG tablet Take  by mouth.     • magnesium oxide (MAG-OX) 400 MG tablet Take 400 mg by mouth.     • Melatonin 10 MG capsule Take 2 capsules by mouth daily.     • methocarbamol (ROBAXIN) 500 MG tablet   0   • methscopolamine (PAMINE) 2.5 MG tablet Take 1 tablet by mouth 2 (Two) Times a Day As Needed (congestion). 120 tablet 5   • montelukast (SINGULAIR) 10 MG tablet Take 1 tablet by mouth Every Night. 90 tablet 3   • mupirocin (BACTROBAN) 2 % cream Apply  topically 3 (Three) Times a Day. 15 g 2   • mupirocin (BACTROBAN) 2 % ointment mupirocin 2 % topical ointment     • naproxen (NAPROSYN) 500 MG tablet Take 1 tablet by mouth 2 (Two) Times a Day With Meals. 180 tablet 3   • olopatadine (PATADAY) 0.2 % solution ophthalmic solution 1 drop daily. 1 drop in each eye ophthalmic daily     • olopatadine (PATANASE) 0.6 % solution nasal solution by Each Nare route 2 (two) times a day.     • Potassium 75 MG tablet Take  by mouth.     • sertraline (ZOLOFT) 25 MG tablet Take 1 tablet by mouth Daily. 90 tablet 3   • sodium chloride (NS) 0.9 % irrigation Irrigate with 60 mL to the affected area as directed by provider 2 (two) times a day. 1000  mL 2   • terbinafine (LamiSIL) 250 MG tablet Take 250 mg by mouth daily.     • traMADol (ULTRAM) 50 MG tablet Take 2 tablets by mouth Every 6 (Six) Hours As Needed for Severe Pain . 240 tablet 5   • Vitamins A & D (Vitamin A & D) 5000-400 units capsule Take  by mouth.       Current Facility-Administered Medications on File Prior to Visit   Medication Dose Route Frequency Provider Last Rate Last Admin   • cyanocobalamin injection 1,000 mcg  1,000 mcg Intramuscular Q28 Days Saranya Terrell PA-C   1,000 mcg at 10/08/19 1648   • cyanocobalamin injection 1,000 mcg  1,000 mcg Intramuscular Q28 Days Saranya Terrell, PA-C   1,000 mcg at 01/31/20 1454   • cyanocobalamin injection 1,000 mcg  1,000 mcg Intramuscular Q28 Days Saranya Terrell, PA-C   1,000 mcg at 06/29/20 1347      Allergies   Allergen Reactions   • Penicillins       Past Medical History:   Diagnosis Date   • Allergic rhinitis    • Benign paroxysmal positional vertigo    • Bursitis/tendonitis, shoulder    • Chronic nonsuppurative otitis media    • Depressive disorder    • Fatigue    • Headache, cervicogenic    • Hypothyroidism    • Insomnia    • Myalgia    • Nail fungus    • Nervousness    • OA (osteoarthritis) of shoulder    • CHIQUITA (obstructive sleep apnea)    • Osteoporosis    • Preventive measure    • Sciatica of right side    • TSH (thyroid-stimulating hormone deficiency)    • Vitamin D deficiency      Past Surgical History:   Procedure Laterality Date   • FOOT SURGERY Right     pinched nerve   • FOOT SURGERY Left 01/17/2019    1st MPJ fusion - Dr. Gwendolyn Del Rosario ; [facility]    • NOSE SURGERY  1985     Family History   Problem Relation Age of Onset   • Stroke Mother    • Cancer Father    • Breast cancer Sister 73   • Breast cancer Paternal Aunt         DX AGE UNKNOWN   • Breast cancer Cousin         SEVERAL COUSINS - DX AGES UNKNOWN   • Breast cancer Paternal Aunt         DX AGE UNKNOWN   • Ovarian cancer Neg Hx       Social History  "    Socioeconomic History   • Marital status:      Spouse name: Not on file   • Number of children: Not on file   • Years of education: Not on file   • Highest education level: Not on file   Tobacco Use   • Smoking status: Never Smoker   • Smokeless tobacco: Never Used   Substance and Sexual Activity   • Alcohol use: Yes     Comment: social   • Drug use: No   • Sexual activity: Yes        Review of Systems   Constitutional: Negative.    HENT: Negative.    Eyes: Negative.    Respiratory: Negative.    Cardiovascular: Negative.    Gastrointestinal: Negative.    Endocrine: Negative.    Genitourinary: Negative.    Musculoskeletal: Positive for arthralgias.   Skin: Negative.    Allergic/Immunologic: Negative.    Neurological: Negative.    Hematological: Negative.    Psychiatric/Behavioral: Negative.        The following portions of the patient's history were reviewed and updated as appropriate: allergies, current medications, past family history, past medical history, past social history, past surgical history and problem list.    Physical Exam:   /64   Pulse 82   Ht 174 cm (68.5\")   Wt 68.4 kg (150 lb 12.8 oz)   LMP  (LMP Unknown)   BMI 22.59 kg/m²   GENERAL: Body habitus: normal weight for height    Lower extremity edema: Left: none; Right: none    Gait: normal     Mental Status:  awake and alert; oriented to person, place, and time  MSK:  Tibia:  Right:  non tender; Left:  non tender        Ankle:  Right: non tender; Left:  non tender        Foot:  Right:  No tenderness in the right foot today, no change in the hallux valgus metatarsus primus varus and moderate hammertoes, the third toe does have a purplish discoloration when hanging down, it blanches, the toe is not swollen, there is no erythema, there is no fluctuance no swelling no purulence.  There is a small scab on the tip of the third toe and I removed it there is a small superficial ulcer under it, but no definite signs of infection.  The toe " is not tender.  It has a moderate hammertoe deformity but is relatively flexible; Left:  non tender    NEURO Sensation:  diminished    Medical Decision Making    Data Review:   ordered and reviewed x-rays today    Assessment/Plan/Diagnosis/Treatment Options:   1. Toe injury, right, subsequent encounter  I am not certain why she has had this persistent problem.  I do not see any definite signs of osteomyelitis, the small ulcer is very superficial.  Given that its been going on for 3 months if this was infection I would expect the toe to be clinically much worse.  We will try to obtain the studies from [facility].  I will order an MRI of the toe.  I want her to stay only in an open sandal and put a Bactroban dressing on it once a day, I will see her back following the MRI      Addendum: We were able to obtain records from [facility].  They are dated 3/12/2021.  She had ABIs done that were normal, she had a normal white count, metabolic panel and normal x-rays.  (They did note the hallux valgus as expected) the ER notes indicate they thought she had cellulitis and she was started on clindamycin  - XR Toe 2+ View Right; Future  - MRI Foot Right With & Without Contrast; Future        Gwendolyn Huff MD

## 2021-05-20 ENCOUNTER — OFFICE VISIT (OUTPATIENT)
Dept: FAMILY MEDICINE CLINIC | Facility: CLINIC | Age: 71
End: 2021-05-20

## 2021-05-20 VITALS
HEART RATE: 80 BPM | WEIGHT: 156 LBS | DIASTOLIC BLOOD PRESSURE: 76 MMHG | HEIGHT: 68 IN | BODY MASS INDEX: 23.64 KG/M2 | OXYGEN SATURATION: 99 % | SYSTOLIC BLOOD PRESSURE: 134 MMHG | TEMPERATURE: 97 F

## 2021-05-20 DIAGNOSIS — M47.816 LUMBAR SPONDYLOSIS: ICD-10-CM

## 2021-05-20 DIAGNOSIS — E03.9 ACQUIRED HYPOTHYROIDISM: Primary | ICD-10-CM

## 2021-05-20 DIAGNOSIS — G89.29 CHRONIC NECK PAIN: ICD-10-CM

## 2021-05-20 DIAGNOSIS — M15.9 PRIMARY OSTEOARTHRITIS INVOLVING MULTIPLE JOINTS: ICD-10-CM

## 2021-05-20 DIAGNOSIS — M54.2 CHRONIC NECK PAIN: ICD-10-CM

## 2021-05-20 DIAGNOSIS — M47.812 SPONDYLOSIS OF CERVICAL REGION WITHOUT MYELOPATHY OR RADICULOPATHY: ICD-10-CM

## 2021-05-20 DIAGNOSIS — G62.9 NEUROPATHY: ICD-10-CM

## 2021-05-20 PROCEDURE — 99214 OFFICE O/P EST MOD 30 MIN: CPT | Performed by: PHYSICIAN ASSISTANT

## 2021-05-20 PROCEDURE — 96372 THER/PROPH/DIAG INJ SC/IM: CPT | Performed by: PHYSICIAN ASSISTANT

## 2021-05-20 RX ORDER — CYANOCOBALAMIN 1000 UG/ML
1000 INJECTION, SOLUTION INTRAMUSCULAR; SUBCUTANEOUS ONCE
Status: COMPLETED | OUTPATIENT
Start: 2021-05-20 | End: 2021-05-20

## 2021-05-20 RX ORDER — METHOCARBAMOL 500 MG/1
500 TABLET, FILM COATED ORAL 4 TIMES DAILY
Qty: 40 TABLET | Refills: 1 | Status: SHIPPED | OUTPATIENT
Start: 2021-05-20 | End: 2021-08-13

## 2021-05-20 RX ORDER — LEVOTHYROXINE SODIUM 0.03 MG/1
25 TABLET ORAL DAILY
Qty: 90 TABLET | Refills: 3 | Status: SHIPPED | OUTPATIENT
Start: 2021-05-20 | End: 2022-07-07

## 2021-05-20 RX ADMIN — CYANOCOBALAMIN 1000 MCG: 1000 INJECTION, SOLUTION INTRAMUSCULAR; SUBCUTANEOUS at 15:45

## 2021-05-20 NOTE — PROGRESS NOTES
Subjective   Marci Snyder is a 70 y.o. female  Neck Pain (increased neck pain x6 weeks, req possible xray and rf on tramadol ) and Hypothyroidism (follow up thyroid, possible labs, refills on levothyroxine )      History of Present Illness  Patient is a very pleasant 70-year-old white female who comes in today for follow-up of hypothyroidism as well as chronic back pain, hand pain and shoulder pain associate with arthritis and lumbar spondylosis.  Her chronic shoulder hand and back pain are currently stable on tramadol at current dosage of 100 mg 4 times daily and she is due for refills.  She denies any adverse effects with medication.  Without this current medication regimen she has severe pain limiting her ability to carry out ADLs.  She states that she has history of chronic neck pain with herniated disks at C5-6 and 7 going back over 10 years but in general has done well ~6 weeks ago.  Never had surgery on her neck.  She states the past week she had constant, daily neck pain specifically in the back right portion of her neck worse with extending her spine/neck upwards or turning her neck to the right.  She has been doing more driving lately but no other change in activity.  Occasionally she has sharp pain radiating into her shoulder and upper arm.  She states her neck pain is constant.  The following portions of the patient's history were reviewed and updated as appropriate: allergies, current medications, past social history and problem list    Review of Systems   Constitutional: Negative.  Negative for fatigue and unexpected weight change.   Eyes: Negative for visual disturbance.   Respiratory: Negative.    Cardiovascular: Negative for chest pain and palpitations.   Gastrointestinal: Negative.  Negative for constipation and diarrhea.   Endocrine: Negative for cold intolerance and heat intolerance.   Genitourinary: Negative.    Musculoskeletal: Positive for arthralgias, back pain and neck pain. Negative  for gait problem and myalgias.   Neurological: Positive for numbness. Negative for dizziness, tremors and weakness.   Psychiatric/Behavioral: Negative for agitation. The patient is not nervous/anxious.        Objective     Vitals:    05/20/21 1412   BP: 134/76   Pulse: 80   Temp: 97 °F (36.1 °C)   SpO2: 99%       Physical Exam  Vitals and nursing note reviewed.   Constitutional:       General: She is not in acute distress.     Appearance: Normal appearance. She is well-developed and normal weight. She is not ill-appearing, toxic-appearing or diaphoretic.   HENT:      Head: Normocephalic and atraumatic.   Neck:      Thyroid: No thyromegaly.      Vascular: No JVD.      Trachea: No tracheal deviation.   Cardiovascular:      Rate and Rhythm: Normal rate and regular rhythm.   Pulmonary:      Effort: Pulmonary effort is normal.      Breath sounds: Normal breath sounds. No stridor.   Musculoskeletal:      Cervical back: Tenderness present. Spinous process tenderness and muscular tenderness present. Decreased range of motion.   Lymphadenopathy:      Cervical: No cervical adenopathy.   Skin:     General: Skin is warm and dry.      Coloration: Skin is not jaundiced or pale.      Findings: No bruising.   Neurological:      Mental Status: She is alert and oriented to person, place, and time.      Motor: No weakness.      Coordination: Coordination normal.      Gait: Gait normal.   Psychiatric:         Mood and Affect: Mood normal.         Behavior: Behavior normal.         Thought Content: Thought content normal.         Judgment: Judgment normal.         Assessment/Plan     Diagnoses and all orders for this visit:    1. Acquired hypothyroidism (Primary)  -     TSH; Future  -     levothyroxine (SYNTHROID, LEVOTHROID) 25 MCG tablet; Take 1 tablet by mouth Daily.  Dispense: 90 tablet; Refill: 3    2. Spondylosis of cervical region without myelopathy or radiculopathy    3. Lumbar spondylosis    4. Primary osteoarthritis involving  multiple joints    5. Chronic neck pain  -     XR Spine Cervical Complete 4 or 5 View; Future    6. Neuropathy  -     cyanocobalamin injection 1,000 mcg    Other orders  -     methocarbamol (ROBAXIN) 500 MG tablet; Take 1 tablet by mouth 4 (Four) Times a Day. As needed for muscle spasm in neck  Dispense: 40 tablet; Refill: 1  -     Diclofenac Sodium (VOLTAREN) 1 % gel gel; Apply 2 g topically to the appropriate area as directed 2 (Two) Times a Day. For neck pain  Dispense: 100 g; Refill: 2    Refill given of tramadol at current dosage 50 mg tablets take 2 4 times daily for chronic pain associate with arthritis #240 with 6 refills.  Discussed abuse potential and controlled substance as of this medication.  Octavio reviewed, appropriate.  Will contact patient with x-ray report I received it.  Discussed may need MRI and/or referral to physical therapy or orthopedic specialist.  B12 shot given today.

## 2021-05-21 ENCOUNTER — HOSPITAL ENCOUNTER (OUTPATIENT)
Dept: GENERAL RADIOLOGY | Facility: HOSPITAL | Age: 71
Discharge: HOME | End: 2021-05-21
Admitting: PHYSICIAN ASSISTANT

## 2021-05-21 DIAGNOSIS — M54.2 CHRONIC NECK PAIN: ICD-10-CM

## 2021-05-21 DIAGNOSIS — G89.29 CHRONIC NECK PAIN: ICD-10-CM

## 2021-05-21 PROCEDURE — 72050 X-RAY EXAM NECK SPINE 4/5VWS: CPT

## 2021-06-01 ENCOUNTER — TRANSCRIBE ORDERS (OUTPATIENT)
Dept: FAMILY MEDICINE CLINIC | Facility: CLINIC | Age: 71
End: 2021-06-01

## 2021-06-01 DIAGNOSIS — M54.2 NECK PAIN: Primary | ICD-10-CM

## 2021-06-11 ENCOUNTER — HOSPITAL ENCOUNTER (OUTPATIENT)
Dept: MRI IMAGING | Facility: HOSPITAL | Age: 71
Discharge: HOME | End: 2021-06-11

## 2021-06-11 DIAGNOSIS — S99.921D TOE INJURY, RIGHT, SUBSEQUENT ENCOUNTER: ICD-10-CM

## 2021-06-11 PROCEDURE — A9577 INJ MULTIHANCE: HCPCS | Performed by: ORTHOPAEDIC SURGERY

## 2021-06-11 PROCEDURE — 73720 MRI LWR EXTREMITY W/O&W/DYE: CPT

## 2021-06-11 PROCEDURE — 82565 ASSAY OF CREATININE: CPT

## 2021-06-11 PROCEDURE — 0 GADOBENATE DIMEGLUMINE 529 MG/ML SOLUTION: Performed by: ORTHOPAEDIC SURGERY

## 2021-06-11 PROCEDURE — 72141 MRI NECK SPINE W/O DYE: CPT

## 2021-06-11 RX ADMIN — GADOBENATE DIMEGLUMINE 14 ML: 529 INJECTION, SOLUTION INTRAVENOUS at 18:47

## 2021-06-13 LAB — CREAT BLDA-MCNC: 1.2 MG/DL (ref 0.6–1.3)

## 2021-06-14 DIAGNOSIS — G89.29 CHRONIC NECK PAIN WITH ABNORMAL NEUROLOGIC EXAMINATION: Primary | ICD-10-CM

## 2021-06-14 DIAGNOSIS — M54.2 CHRONIC NECK PAIN WITH ABNORMAL NEUROLOGIC EXAMINATION: Primary | ICD-10-CM

## 2021-06-23 ENCOUNTER — OFFICE VISIT (OUTPATIENT)
Dept: ORTHOPEDIC SURGERY | Facility: CLINIC | Age: 71
End: 2021-06-23

## 2021-06-23 VITALS
HEART RATE: 85 BPM | HEIGHT: 68 IN | WEIGHT: 148 LBS | DIASTOLIC BLOOD PRESSURE: 61 MMHG | SYSTOLIC BLOOD PRESSURE: 149 MMHG | BODY MASS INDEX: 22.43 KG/M2

## 2021-06-23 DIAGNOSIS — M86.9 OSTEOMYELITIS OF THIRD TOE OF RIGHT FOOT (HCC): Primary | ICD-10-CM

## 2021-06-23 DIAGNOSIS — G62.9 NEUROPATHY: ICD-10-CM

## 2021-06-23 PROCEDURE — 99214 OFFICE O/P EST MOD 30 MIN: CPT | Performed by: ORTHOPAEDIC SURGERY

## 2021-06-23 NOTE — PROGRESS NOTES
ESTABLISHED PATIENT    Patient: Marci Snyder  : 1950    Primary Care Provider: Jimbo Vuong MD    Requesting Provider: As above    Follow-up (MRI (21) follow up; Toe injury, right)      History    Chief Complaint: Right third toe pain    History of Present Illness: She returns for follow-up of the MRI of her right foot.  I looked at the films and the report, 2021.  It does look like she has chronic osteomyelitis in the distal phalanx of the third toe.  She reports that it still red when it hangs down, still a little bit puffy, the ulcer on the tip of the toe has closed.  She has had a lot of other problems since I last saw her including neck and shoulder pain, some calf pain, and she is being worked up by Dr. Anglin's office.    Current Outpatient Medications on File Prior to Visit   Medication Sig Dispense Refill   • Allergy 4 MG tablet Take  by mouth Daily.     • Apoaequorin (Prevagen Extra Strength) 20 MG capsule Take 1 tablet by mouth Daily. 30 capsule 11   • azelastine (OPTIVAR) 0.05 % ophthalmic solution azelastine 0.05 % eye drops     • baclofen (LIORESAL) 10 MG tablet Take 1 tablet by mouth Daily. 30 tablet 5   • buPROPion XL (WELLBUTRIN XL) 150 MG 24 hr tablet Take 1 tablet by mouth 2 (Two) Times a Day. 180 tablet 3   • Calcium Carbonate-Vit D-Min (CALCIUM 1200) 1899-0780 MG-UNIT chewable tablet Chew.     • Calcium Carbonate-Vitamin D 600-200 MG-UNIT tablet Take 1 tablet by mouth.     • cetirizine (ZyrTEC) 10 MG tablet Take 10 mg by mouth daily.     • Cholecalciferol (VITAMIN D3) 2000 UNITS tablet Take  by mouth.     • diclofenac (VOLTAREN) 1 % gel gel Apply 4 g topically to the appropriate area as directed 2 (Two) Times a Day. To hands as needed 100 g 11   • Diclofenac Sodium (VOLTAREN) 1 % gel gel Apply 2 g topically to the appropriate area as directed 2 (Two) Times a Day. For neck pain 100 g 2   • ESTRACE VAGINAL 0.1 MG/GM vaginal cream   1   •  fluocinonide-emollient (LIDEX-E) 0.05 % cream Apply  topically to the appropriate area as directed 2 (Two) Times a Day. 30 g 1   • FLUZONE HIGH-DOSE 0.5 ML suspension prefilled syringe injection inject 0.5 milliliter intramuscularly  0   • ipratropium (ATROVENT) 0.03 % nasal spray 1 spray 2 (Two) Times a Day.     • levocetirizine (XYZAL) 5 MG tablet Take 5 mg by mouth Daily.  0   • levothyroxine (SYNTHROID, LEVOTHROID) 25 MCG tablet Take 1 tablet by mouth Daily. 90 tablet 3   • loratadine (CLARITIN) 10 MG tablet Take 1 tablet by mouth Daily. 90 tablet 3   • Magnesium 100 MG tablet Take  by mouth.     • magnesium oxide (MAG-OX) 400 MG tablet Take 400 mg by mouth.     • Melatonin 10 MG capsule Take 2 capsules by mouth daily.     • methocarbamol (ROBAXIN) 500 MG tablet Take 1 tablet by mouth 4 (Four) Times a Day. As needed for muscle spasm in neck 40 tablet 1   • methscopolamine (PAMINE) 2.5 MG tablet Take 1 tablet by mouth 2 (Two) Times a Day As Needed (congestion). 120 tablet 5   • montelukast (SINGULAIR) 10 MG tablet Take 1 tablet by mouth Every Night. 90 tablet 3   • mupirocin (BACTROBAN) 2 % cream Apply  topically 3 (Three) Times a Day. 15 g 2   • mupirocin (BACTROBAN) 2 % ointment mupirocin 2 % topical ointment     • mupirocin (Bactroban) 2 % ointment Apply  topically to the appropriate area as directed Daily. 30 g 2   • naproxen (NAPROSYN) 500 MG tablet Take 1 tablet by mouth 2 (Two) Times a Day With Meals. 180 tablet 3   • olopatadine (PATADAY) 0.2 % solution ophthalmic solution 1 drop daily. 1 drop in each eye ophthalmic daily     • olopatadine (PATANASE) 0.6 % solution nasal solution by Each Nare route 2 (two) times a day.     • Potassium 75 MG tablet Take  by mouth.     • sertraline (ZOLOFT) 25 MG tablet Take 1 tablet by mouth Daily. 90 tablet 3   • sodium chloride (NS) 0.9 % irrigation Irrigate with 60 mL to the affected area as directed by provider 2 (two) times a day. 1000 mL 2   • terbinafine (LamiSIL)  250 MG tablet Take 250 mg by mouth daily.     • traMADol (ULTRAM) 50 MG tablet Take 2 tablets by mouth Every 6 (Six) Hours As Needed for Severe Pain . 240 tablet 5   • Vitamins A & D (Vitamin A & D) 5000-400 units capsule Take  by mouth.       Current Facility-Administered Medications on File Prior to Visit   Medication Dose Route Frequency Provider Last Rate Last Admin   • cyanocobalamin injection 1,000 mcg  1,000 mcg Intramuscular Q28 Days Saranya Terrell., PA-C   1,000 mcg at 10/08/19 1648   • cyanocobalamin injection 1,000 mcg  1,000 mcg Intramuscular Q28 Days Saranya Terrell N., PA-C   1,000 mcg at 01/31/20 1454   • cyanocobalamin injection 1,000 mcg  1,000 mcg Intramuscular Q28 Days Saranya Terrell., PA-C   1,000 mcg at 06/29/20 1347      Allergies   Allergen Reactions   • Penicillins       Past Medical History:   Diagnosis Date   • Allergic rhinitis    • Benign paroxysmal positional vertigo    • Bursitis/tendonitis, shoulder    • Chronic nonsuppurative otitis media    • Depressive disorder    • Fatigue    • Headache, cervicogenic    • Hypothyroidism    • Insomnia    • Myalgia    • Nail fungus    • Nervousness    • OA (osteoarthritis) of shoulder    • CHIQUITA (obstructive sleep apnea)    • Osteoporosis    • Preventive measure    • Sciatica of right side    • TSH (thyroid-stimulating hormone deficiency)    • Vitamin D deficiency      Past Surgical History:   Procedure Laterality Date   • FOOT SURGERY Right     pinched nerve   • FOOT SURGERY Left 01/17/2019    1st MPJ fusion - Dr. Gwendolyn Del Rosario ; [facility]    • NOSE SURGERY  1985     Family History   Problem Relation Age of Onset   • Stroke Mother    • Cancer Father    • Breast cancer Sister 73   • Breast cancer Paternal Aunt         DX AGE UNKNOWN   • Breast cancer Cousin         SEVERAL COUSINS - DX AGES UNKNOWN   • Breast cancer Paternal Aunt         DX AGE UNKNOWN   • Ovarian cancer Neg Hx       Social History     Socioeconomic History   •  "Marital status:      Spouse name: Not on file   • Number of children: Not on file   • Years of education: Not on file   • Highest education level: Not on file   Tobacco Use   • Smoking status: Never Smoker   • Smokeless tobacco: Never Used   Substance and Sexual Activity   • Alcohol use: Yes     Comment: social   • Drug use: No   • Sexual activity: Yes        Review of Systems   Constitutional: Negative.    HENT: Negative.    Eyes: Negative.    Respiratory: Negative.    Cardiovascular: Negative.    Gastrointestinal: Negative.    Endocrine: Negative.    Genitourinary: Negative.    Musculoskeletal: Positive for arthralgias.   Skin: Negative.    Allergic/Immunologic: Negative.    Neurological: Negative.    Hematological: Negative.    Psychiatric/Behavioral: Negative.        The following portions of the patient's history were reviewed and updated as appropriate: allergies, current medications, past family history, past medical history, past social history, past surgical history and problem list.    Physical Exam:   /61   Pulse 85   Ht 172.8 cm (68.03\")   Wt 67.1 kg (148 lb)   LMP  (LMP Unknown)   BMI 22.48 kg/m²   GENERAL: Gait: normal       MSK:  Ankle:  Right: non tender; Left:  non tender        Foot:  Right:  Slightly tender in the distal phalanx of the third toe, mild dependent rubor, toe is slightly thickened, the ulcer on the tip is healed there is just a small callus there, no change in the hammertoes, otherwise nontender; Left:  non tender    NEURO Sensation: She has a history of neuropathy but is intact to light touch    Medical Decision Making    Data Review:   reviewed radiology images and reviewed radiology results    Assessment/Plan/Diagnosis/Treatment Options:   1. Osteomyelitis of third toe of right foot (CMS/HCC)  The MRI definitely suggest osteomyelitis of the distal phalanx of the third toe.  I looked at the films myself.  It is unusual given her history, but it may be that she has " enough neuropathy that the injury persisted as a wound long enough to infect the bone without her really realizing it.  In any event the MRI it looks like osteomyelitis now.  We discussed the options.  I explained that 1 option for treatment would be to see infectious disease and consider 6 weeks of IV antibiotics.  I think however that all the infectious disease doctors would recommend that she just go ahead with a simple amputation of the toe at the PIP joint.  The risk of 6 weeks of IV antibiotics and the risks to renal dysfunction etc. outweigh the simple amputation.  I explained that that is what I would recommend.  I explained that she would not notice the absence of the toe except cosmetically.  I would remove it at the PIP joint.  It would not affect her balance etc.  We can do it as an outpatient under local with sedation.  She initially decided that is what she would do.  And we gave her surgery date.  She then spoke again with the surgery scheduler and she reports she is going to think about it.  I would be happy to go either way if she wishes.  She also certainly could seek another opinion at .  If we did this she would be in a postop shoe 2 weeks at least and not be able to drive.  She remembers how foot surgery swells because of the left foot surgery.  We discussed the risks including but not limited to: death, infection, neurovascular damage, strokes, heart attacks, blood clots, chronic pain, deformity, stiffness, need for  further surgery,  amputation, etc.  Questions asked and answered in detail.  I would definitely recommend that she do either treatment, either the antibiotics or the surgery, because of the small risk that the infection can spread to her foot.  We discussed all of this in detail.  We will tentatively schedule the surgery she will call back if she wishes to change      - Case Request; Standing  - CBC and Differential; Future  - Basic metabolic panel; Future  - Hemoglobin A1c;  Future  - ECG 12 Lead; Future  - ceFAZolin (ANCEF) 2 g in sodium chloride 0.9 % 100 mL IVPB  - Sedimentation rate; Future  - C-reactive protein; Future  - Case Request    2. Neuropathy  She has neuropathy secondary to B12 deficiency, mild on exam but may be sufficient to have had an ulcer on this toe longer than she knew

## 2021-07-02 ENCOUNTER — OFFICE VISIT (OUTPATIENT)
Dept: ORTHOPEDIC SURGERY | Facility: CLINIC | Age: 71
End: 2021-07-02

## 2021-07-02 ENCOUNTER — APPOINTMENT (OUTPATIENT)
Dept: GENERAL RADIOLOGY | Facility: HOSPITAL | Age: 71
End: 2021-07-02

## 2021-07-02 VITALS
WEIGHT: 147.93 LBS | SYSTOLIC BLOOD PRESSURE: 139 MMHG | HEIGHT: 68 IN | BODY MASS INDEX: 22.42 KG/M2 | HEART RATE: 73 BPM | DIASTOLIC BLOOD PRESSURE: 54 MMHG

## 2021-07-02 DIAGNOSIS — M17.11 PRIMARY OSTEOARTHRITIS OF RIGHT KNEE: Primary | ICD-10-CM

## 2021-07-02 DIAGNOSIS — M25.561 RIGHT KNEE PAIN, UNSPECIFIED CHRONICITY: ICD-10-CM

## 2021-07-02 PROCEDURE — 99214 OFFICE O/P EST MOD 30 MIN: CPT | Performed by: ORTHOPAEDIC SURGERY

## 2021-07-02 NOTE — PROGRESS NOTES
Orthopaedic Clinic Note: Knee New Patient    Chief Complaint   Patient presents with   • Right Knee - Pain        HPI    Marci Snyder is a 70 y.o. female who presents with right knee pain for 6 week(s). Onset atraumatic and gradual in nature. Pain is localized to the entire knee (globally) and is a 9/10 on the pain scale. Pain is described as burning and throbbing. Associated symptoms include popping. The pain is worse with sitting and lying on affected side; ice make it better. Previous treatments have included: NSAIDS since symptom onset. Although some transient relief was reported with these interventions, these conservative measures have failed and symptoms have persisted. The patient is limited in daily activities and has had a significant decrease in quality of life as a result. She denies fevers, chills, or constitutional symptoms.    I have reviewed the following portions of the patient's history:History of Present Illness    Past Medical History:   Diagnosis Date   • Allergic rhinitis    • Benign paroxysmal positional vertigo    • Bursitis/tendonitis, shoulder    • Chronic nonsuppurative otitis media    • Depressive disorder    • Fatigue    • Headache, cervicogenic    • Hypothyroidism    • Insomnia    • Myalgia    • Nail fungus    • Nervousness    • OA (osteoarthritis) of shoulder    • CHIQUITA (obstructive sleep apnea)    • Osteoporosis    • Preventive measure    • Sciatica of right side    • TSH (thyroid-stimulating hormone deficiency)    • Vitamin D deficiency       Past Surgical History:   Procedure Laterality Date   • FOOT SURGERY Right     pinched nerve   • FOOT SURGERY Left 01/17/2019    1st MPJ fusion - Dr. Gwendolyn Del Rosario ; [facility]    • NOSE SURGERY  1985      Family History   Problem Relation Age of Onset   • Stroke Mother    • Cancer Father    • Breast cancer Sister 73   • Breast cancer Paternal Aunt         DX AGE UNKNOWN   • Breast cancer Cousin         SEVERAL COUSINS - DX  AGES UNKNOWN   • Breast cancer Paternal Aunt         DX AGE UNKNOWN   • Ovarian cancer Neg Hx      Social History     Socioeconomic History   • Marital status:      Spouse name: Not on file   • Number of children: Not on file   • Years of education: Not on file   • Highest education level: Not on file   Tobacco Use   • Smoking status: Never Smoker   • Smokeless tobacco: Never Used   Substance and Sexual Activity   • Alcohol use: Yes     Comment: social   • Drug use: No   • Sexual activity: Yes      Current Outpatient Medications on File Prior to Visit   Medication Sig Dispense Refill   • Allergy 4 MG tablet Take  by mouth Daily.     • Apoaequorin (Prevagen Extra Strength) 20 MG capsule Take 1 tablet by mouth Daily. 30 capsule 11   • azelastine (OPTIVAR) 0.05 % ophthalmic solution azelastine 0.05 % eye drops     • baclofen (LIORESAL) 10 MG tablet Take 1 tablet by mouth Daily. 30 tablet 5   • buPROPion XL (WELLBUTRIN XL) 150 MG 24 hr tablet Take 1 tablet by mouth 2 (Two) Times a Day. 180 tablet 3   • Calcium Carbonate-Vit D-Min (CALCIUM 1200) 6240-4304 MG-UNIT chewable tablet Chew.     • Calcium Carbonate-Vitamin D 600-200 MG-UNIT tablet Take 1 tablet by mouth.     • cetirizine (ZyrTEC) 10 MG tablet Take 10 mg by mouth daily.     • Cholecalciferol (VITAMIN D3) 2000 UNITS tablet Take  by mouth.     • diclofenac (VOLTAREN) 1 % gel gel Apply 4 g topically to the appropriate area as directed 2 (Two) Times a Day. To hands as needed 100 g 11   • Diclofenac Sodium (VOLTAREN) 1 % gel gel Apply 2 g topically to the appropriate area as directed 2 (Two) Times a Day. For neck pain 100 g 2   • ESTRACE VAGINAL 0.1 MG/GM vaginal cream   1   • fluocinonide-emollient (LIDEX-E) 0.05 % cream Apply  topically to the appropriate area as directed 2 (Two) Times a Day. 30 g 1   • FLUZONE HIGH-DOSE 0.5 ML suspension prefilled syringe injection inject 0.5 milliliter intramuscularly  0   • ipratropium (ATROVENT) 0.03 % nasal spray 1  spray 2 (Two) Times a Day.     • levocetirizine (XYZAL) 5 MG tablet Take 5 mg by mouth Daily.  0   • levothyroxine (SYNTHROID, LEVOTHROID) 25 MCG tablet Take 1 tablet by mouth Daily. 90 tablet 3   • loratadine (CLARITIN) 10 MG tablet Take 1 tablet by mouth Daily. 90 tablet 3   • Magnesium 100 MG tablet Take  by mouth.     • magnesium oxide (MAG-OX) 400 MG tablet Take 400 mg by mouth.     • Melatonin 10 MG capsule Take 2 capsules by mouth daily.     • methocarbamol (ROBAXIN) 500 MG tablet Take 1 tablet by mouth 4 (Four) Times a Day. As needed for muscle spasm in neck 40 tablet 1   • methscopolamine (PAMINE) 2.5 MG tablet Take 1 tablet by mouth 2 (Two) Times a Day As Needed (congestion). 120 tablet 5   • montelukast (SINGULAIR) 10 MG tablet Take 1 tablet by mouth Every Night. 90 tablet 3   • mupirocin (BACTROBAN) 2 % cream Apply  topically 3 (Three) Times a Day. 15 g 2   • mupirocin (BACTROBAN) 2 % ointment mupirocin 2 % topical ointment     • mupirocin (Bactroban) 2 % ointment Apply  topically to the appropriate area as directed Daily. 30 g 2   • naproxen (NAPROSYN) 500 MG tablet Take 1 tablet by mouth 2 (Two) Times a Day With Meals. 180 tablet 3   • olopatadine (PATADAY) 0.2 % solution ophthalmic solution 1 drop daily. 1 drop in each eye ophthalmic daily     • olopatadine (PATANASE) 0.6 % solution nasal solution by Each Nare route 2 (two) times a day.     • Potassium 75 MG tablet Take  by mouth.     • sertraline (ZOLOFT) 25 MG tablet Take 1 tablet by mouth Daily. 90 tablet 3   • sodium chloride (NS) 0.9 % irrigation Irrigate with 60 mL to the affected area as directed by provider 2 (two) times a day. 1000 mL 2   • terbinafine (LamiSIL) 250 MG tablet Take 250 mg by mouth daily.     • traMADol (ULTRAM) 50 MG tablet Take 2 tablets by mouth Every 6 (Six) Hours As Needed for Severe Pain . 240 tablet 5   • Vitamins A & D (Vitamin A & D) 5000-400 units capsule Take  by mouth.       Current Facility-Administered Medications  "on File Prior to Visit   Medication Dose Route Frequency Provider Last Rate Last Admin   • cyanocobalamin injection 1,000 mcg  1,000 mcg Intramuscular Q28 Days Saranya Terrell PA-C   1,000 mcg at 10/08/19 1648   • cyanocobalamin injection 1,000 mcg  1,000 mcg Intramuscular Q28 Days Saranya Terrell, PA-C   1,000 mcg at 01/31/20 1454   • cyanocobalamin injection 1,000 mcg  1,000 mcg Intramuscular Q28 Days Saranya Terrell, PA-C   1,000 mcg at 06/29/20 1347      Allergies   Allergen Reactions   • Penicillins         Review of Systems   Constitutional: Negative.    HENT: Negative.    Eyes: Negative.    Respiratory: Negative.    Cardiovascular: Negative.    Gastrointestinal: Negative.    Endocrine: Negative.    Genitourinary: Negative.    Musculoskeletal: Positive for arthralgias.   Skin: Negative.    Allergic/Immunologic: Negative.    Neurological: Negative.    Hematological: Negative.    Psychiatric/Behavioral: Negative.         The patient's Review of Systems was personally reviewed and confirmed as accurate.    The following portions of the patient's history were reviewed and updated as appropriate: allergies, current medications, past family history, past medical history, past social history, past surgical history and problem list.    Physical Exam  Blood pressure 139/54, pulse 73, height 172.8 cm (68.03\"), weight 67.1 kg (147 lb 14.9 oz), not currently breastfeeding.    Body mass index is 22.47 kg/m².    GENERAL APPEARANCE: awake, alert & oriented x 3, in no acute distress and well developed, well nourished  PSYCH: normal affect  LUNGS:  breathing nonlabored  EYES: sclera anicteric  CARDIOVASCULAR: palpable dorsalis pedis, palpable posterior tibial bilaterally. Capillary refill less than 2 seconds  EXTREMITIES: no clubbing, cyanosis  GAIT:  Antalgic            Right Lower Extremity Exam:   ----------  Hip Exam  ----------  FLEXION CONTRACTURE: None  FLEXION: 110 degrees  INTERNAL ROTATION: 20 degrees at 90 " degrees of flexion   EXTERNAL ROTATION: 40 degrees at 90 degrees of flexion    PAIN WITH HIP MOTION: no  ----------  Knee Exam  ----------  ALIGNMENT: mild varus, correctible to neutral    RANGE OF MOTION:  Normal (0-120 degrees) with no extensor lag or flexion contracture  LIGAMENTOUS STABILITY:   stable to varus and valgus stress at terminal extension and 30 degrees; retensioning of the MCL is appreciated with valgus stress at 30 degrees consistent with medial compartment degeneration     STRENGTH:  5/5 knee flexion, extension. 5/5 ankle dorsiflexion and plantarflexion.     PAIN WITH PALPATION: medial joint line, lateral joint line and anterior knee  KNEE EFFUSION: yes, trace effusion  PAIN WITH KNEE ROM: yes, global  PATELLAR CREPITUS: yes, painful and symptomatic  SPECIAL EXAM FINDINGS:  none    REFLEXES:  PATELLAR 2+/4  ACHILLES 2+/4    CLONUS: no  STRAIGHT LEG TEST:   negative    SENSATION TO LIGHT TOUCH:  DEEP PERONEAL/SUPERFICIAL PERONEAL/SURAL/SAPHENOUS/TIBIAL:   intact    EDEMA:  no  ERYTHEMA:  no  WOUNDS/INCISIONS:  no        Left Lower Extremity Exam:   ----------  Hip Exam  ----------  FLEXION CONTRACTURE: None  FLEXION: 110 degrees  INTERNAL ROTATION: 20 degrees at 90 degrees of flexion   EXTERNAL ROTATION: 40 degrees at 90 degrees of flexion    PAIN WITH HIP MOTION: no  ----------  Knee Exam  ----------  ALIGNMENT: neutral, no varus or valgus deformity     RANGE OF MOTION:  Normal (0-120 degrees) with no extensor lag or flexion contracture  LIGAMENTOUS STABILITY:   stable to varus and valgus stress at terminal extension and 30 degrees without any evidence of laxity     STRENGTH:  5/5 knee flexion, extension. 5/5 ankle dorsiflexion and plantarflexion.     PAIN WITH PALPATION: denies tenderness to palpation about the knee, denies medial or lateral joint line pain  KNEE EFFUSION: no  PAIN WITH KNEE ROM: no  PATELLAR CREPITUS: yes, asymptomatic  SPECIAL EXAM FINDINGS:  Negative patellar  compression    REFLEXES:  PATELLAR 2+/4  ACHILLES 2+/4    CLONUS: negative  STRAIGHT LEG TEST:   negative    SENSATION TO LIGHT TOUCH:  DEEP PERONEAL/SUPERFICIAL PERONEAL/SURAL/SAPHENOUS/TIBIAL:   intact    EDEMA:  no  ERYTHEMA:  no  WOUNDS/INCISIONS: no overlying skin problems.    ______________________________________________________________________  ______________________________________________________________________    RADIOGRAPHIC FINDINGS:   Indication: Right knee pain    Comparison: No prior xrays are available for comparison    Right knee(s) 4 views: moderate tricompartmental osteoarthritis with slight varus alignment. Chondrocalcinosis of the medial and lateral compartments is identified. Small periarticular osteophytes visualized no compartments. No acute bony injury or fracture.      Assessment/Plan:   Diagnosis Plan   1. Primary osteoarthritis of right knee  Large Joint Arthrocentesis   2. Right knee pain, unspecified chronicity  XR Knee 4+ View Right     Patient has right knee osteoarthritis. I discussed treatment options. She has failed oral anti-inflammatory treatment with Naprosyn. I discussed proceeding to cortisone injection versus viscosupplementation injection. Patient currently has osteomyelitis of the third toe of her right foot and is not wanting to undergo cortisone injections due to the presence of this infection. She is agreeable to pursuing viscosupplementation injections. We will obtain insurance preauthorization and then initiate injections after insurance approval    Abiodun Baird MD  07/02/21  12:10 EDT

## 2021-07-06 ENCOUNTER — TELEPHONE (OUTPATIENT)
Dept: ORTHOPEDIC SURGERY | Facility: CLINIC | Age: 71
End: 2021-07-06

## 2021-07-08 ENCOUNTER — PRE-ADMISSION TESTING (OUTPATIENT)
Dept: PREADMISSION TESTING | Facility: HOSPITAL | Age: 71
End: 2021-07-08

## 2021-07-08 VITALS — WEIGHT: 156.75 LBS | HEIGHT: 68 IN | BODY MASS INDEX: 23.76 KG/M2

## 2021-07-08 DIAGNOSIS — M86.9 OSTEOMYELITIS OF THIRD TOE OF RIGHT FOOT (HCC): ICD-10-CM

## 2021-07-08 LAB
ANION GAP SERPL CALCULATED.3IONS-SCNC: 11 MMOL/L (ref 5–15)
BASOPHILS # BLD AUTO: 0.04 10*3/MM3 (ref 0–0.2)
BASOPHILS NFR BLD AUTO: 0.9 % (ref 0–1.5)
BUN SERPL-MCNC: 20 MG/DL (ref 8–23)
BUN/CREAT SERPL: 20.4 (ref 7–25)
CALCIUM SPEC-SCNC: 9.6 MG/DL (ref 8.6–10.5)
CHLORIDE SERPL-SCNC: 103 MMOL/L (ref 98–107)
CO2 SERPL-SCNC: 28 MMOL/L (ref 22–29)
CREAT SERPL-MCNC: 0.98 MG/DL (ref 0.57–1)
CRP SERPL-MCNC: <0.3 MG/DL (ref 0–0.5)
DEPRECATED RDW RBC AUTO: 45.1 FL (ref 37–54)
EOSINOPHIL # BLD AUTO: 0.35 10*3/MM3 (ref 0–0.4)
EOSINOPHIL NFR BLD AUTO: 7.7 % (ref 0.3–6.2)
ERYTHROCYTE [DISTWIDTH] IN BLOOD BY AUTOMATED COUNT: 13.1 % (ref 12.3–15.4)
ERYTHROCYTE [SEDIMENTATION RATE] IN BLOOD: 7 MM/HR (ref 0–30)
GFR SERPL CREATININE-BSD FRML MDRD: 56 ML/MIN/1.73
GLUCOSE SERPL-MCNC: 72 MG/DL (ref 65–99)
HBA1C MFR BLD: 5.3 % (ref 4.8–5.6)
HCT VFR BLD AUTO: 41.5 % (ref 34–46.6)
HGB BLD-MCNC: 13.3 G/DL (ref 12–15.9)
IMM GRANULOCYTES # BLD AUTO: 0.01 10*3/MM3 (ref 0–0.05)
IMM GRANULOCYTES NFR BLD AUTO: 0.2 % (ref 0–0.5)
LYMPHOCYTES # BLD AUTO: 1.45 10*3/MM3 (ref 0.7–3.1)
LYMPHOCYTES NFR BLD AUTO: 31.9 % (ref 19.6–45.3)
MCH RBC QN AUTO: 30.2 PG (ref 26.6–33)
MCHC RBC AUTO-ENTMCNC: 32 G/DL (ref 31.5–35.7)
MCV RBC AUTO: 94.1 FL (ref 79–97)
MONOCYTES # BLD AUTO: 0.56 10*3/MM3 (ref 0.1–0.9)
MONOCYTES NFR BLD AUTO: 12.3 % (ref 5–12)
NEUTROPHILS NFR BLD AUTO: 2.13 10*3/MM3 (ref 1.7–7)
NEUTROPHILS NFR BLD AUTO: 47 % (ref 42.7–76)
NRBC BLD AUTO-RTO: 0 /100 WBC (ref 0–0.2)
PLATELET # BLD AUTO: 238 10*3/MM3 (ref 140–450)
PMV BLD AUTO: 10.6 FL (ref 6–12)
POTASSIUM SERPL-SCNC: 4.6 MMOL/L (ref 3.5–5.2)
QT INTERVAL: 386 MS
QTC INTERVAL: 416 MS
RBC # BLD AUTO: 4.41 10*6/MM3 (ref 3.77–5.28)
SODIUM SERPL-SCNC: 142 MMOL/L (ref 136–145)
WBC # BLD AUTO: 4.54 10*3/MM3 (ref 3.4–10.8)

## 2021-07-08 PROCEDURE — 36415 COLL VENOUS BLD VENIPUNCTURE: CPT

## 2021-07-08 PROCEDURE — 80048 BASIC METABOLIC PNL TOTAL CA: CPT

## 2021-07-08 PROCEDURE — 93010 ELECTROCARDIOGRAM REPORT: CPT | Performed by: INTERNAL MEDICINE

## 2021-07-08 PROCEDURE — 83036 HEMOGLOBIN GLYCOSYLATED A1C: CPT

## 2021-07-08 PROCEDURE — 93005 ELECTROCARDIOGRAM TRACING: CPT

## 2021-07-08 PROCEDURE — 85025 COMPLETE CBC W/AUTO DIFF WBC: CPT

## 2021-07-08 PROCEDURE — 85652 RBC SED RATE AUTOMATED: CPT

## 2021-07-08 PROCEDURE — 86140 C-REACTIVE PROTEIN: CPT

## 2021-07-08 RX ORDER — PRASTERONE (DHEA) 50 MG
1 CAPSULE ORAL DAILY
COMMUNITY

## 2021-07-08 NOTE — PAT
Patient instructed to bring CPAP mask and tubing to the hospital for overnight stay.  Explained that it is not necessary to bring their CPAP machine to the hospital instead a CPAP machine will be provided for use by the hospital. If patient knows their CPAP settings, those settings will be implemented.  If not, the CPAP machine will be utilized on the auto setting using their mask and tubing.    Patient verbalized understanding.    Patient to apply Chlorhexadine wipes  to surgical area (as instructed) the night before procedure and the AM of procedure. Wipes provided.    Patient instructed to drink 20 ounces (or until full) of Gatorade and it needs to be completed 1 hour (for Main OR patients) or 2 hours (scheduled  section patients) before given arrival time for procedure (NO RED Gatorade)    Patient verbalized understanding.    It was noted during Pre Admission Testing that patient was wearing some form of fingernail polish (gel/regular) and/or acrylic/artificial nails.  Patient was told that polish and/or artificial nails must be removed for surgery.  If a patient had recent manicure, and would rather not remove polish or artificial nails. Then the minimum requirement is that the polish/artificial nails must be removed from the middle finger on each hand.  Patient verbalized understanding.    If patient was having surgery on an upper extremity, then the patient was instructed that fingernail polish/artificial fingernails must be removed for surgery.  NO EXCEPTIONS.  Patient verbalized understanding.    If patient was having surgery on a lower extremity, then the patient was instructed that toenail polish on both extremities must be removed for surgery.  NO EXCEPTIONS. Patient verbalized understanding.     COVID TEST SCHEDULED 2021

## 2021-07-11 ENCOUNTER — APPOINTMENT (OUTPATIENT)
Dept: PREADMISSION TESTING | Facility: HOSPITAL | Age: 71
End: 2021-07-11

## 2021-07-11 LAB — SARS-COV-2 RNA NOSE QL NAA+PROBE: NOT DETECTED

## 2021-07-11 PROCEDURE — U0004 COV-19 TEST NON-CDC HGH THRU: HCPCS

## 2021-07-11 PROCEDURE — C9803 HOPD COVID-19 SPEC COLLECT: HCPCS

## 2021-07-13 ENCOUNTER — HOSPITAL ENCOUNTER (OUTPATIENT)
Facility: HOSPITAL | Age: 71
Setting detail: HOSPITAL OUTPATIENT SURGERY
Discharge: HOME | End: 2021-07-13
Attending: ORTHOPAEDIC SURGERY | Admitting: ORTHOPAEDIC SURGERY

## 2021-07-13 ENCOUNTER — ANESTHESIA EVENT (OUTPATIENT)
Dept: PERIOP | Facility: HOSPITAL | Age: 71
End: 2021-07-13

## 2021-07-13 ENCOUNTER — ANESTHESIA (OUTPATIENT)
Dept: PERIOP | Facility: HOSPITAL | Age: 71
End: 2021-07-13

## 2021-07-13 VITALS
OXYGEN SATURATION: 99 % | RESPIRATION RATE: 16 BRPM | BODY MASS INDEX: 23.64 KG/M2 | DIASTOLIC BLOOD PRESSURE: 63 MMHG | SYSTOLIC BLOOD PRESSURE: 127 MMHG | HEIGHT: 68 IN | HEART RATE: 63 BPM | TEMPERATURE: 97.2 F | WEIGHT: 156 LBS

## 2021-07-13 DIAGNOSIS — M86.271 SUBACUTE OSTEOMYELITIS OF RIGHT FOOT (HCC): Primary | ICD-10-CM

## 2021-07-13 DIAGNOSIS — M86.9 OSTEOMYELITIS OF THIRD TOE OF RIGHT FOOT (HCC): ICD-10-CM

## 2021-07-13 PROCEDURE — 25010000002 ROPIVACAINE PER 1 MG: Performed by: ORTHOPAEDIC SURGERY

## 2021-07-13 PROCEDURE — 25010000002 DEXAMETHASONE PER 1 MG: Performed by: NURSE ANESTHETIST, CERTIFIED REGISTERED

## 2021-07-13 PROCEDURE — 88305 TISSUE EXAM BY PATHOLOGIST: CPT | Performed by: ORTHOPAEDIC SURGERY

## 2021-07-13 PROCEDURE — 87102 FUNGUS ISOLATION CULTURE: CPT | Performed by: ORTHOPAEDIC SURGERY

## 2021-07-13 PROCEDURE — 28825 PARTIAL AMPUTATION OF TOE: CPT | Performed by: ORTHOPAEDIC SURGERY

## 2021-07-13 PROCEDURE — 25010000002 PROPOFOL 10 MG/ML EMULSION: Performed by: NURSE ANESTHETIST, CERTIFIED REGISTERED

## 2021-07-13 PROCEDURE — 87116 MYCOBACTERIA CULTURE: CPT | Performed by: ORTHOPAEDIC SURGERY

## 2021-07-13 PROCEDURE — 25010000002 ONDANSETRON PER 1 MG: Performed by: NURSE ANESTHETIST, CERTIFIED REGISTERED

## 2021-07-13 PROCEDURE — 87075 CULTR BACTERIA EXCEPT BLOOD: CPT | Performed by: ORTHOPAEDIC SURGERY

## 2021-07-13 PROCEDURE — 87205 SMEAR GRAM STAIN: CPT | Performed by: ORTHOPAEDIC SURGERY

## 2021-07-13 PROCEDURE — 87206 SMEAR FLUORESCENT/ACID STAI: CPT | Performed by: ORTHOPAEDIC SURGERY

## 2021-07-13 PROCEDURE — 87176 TISSUE HOMOGENIZATION CULTR: CPT | Performed by: ORTHOPAEDIC SURGERY

## 2021-07-13 PROCEDURE — 25010000003 CEFAZOLIN IN DEXTROSE 2-4 GM/100ML-% SOLUTION: Performed by: ORTHOPAEDIC SURGERY

## 2021-07-13 PROCEDURE — 25010000003 LIDOCAINE 1 % SOLUTION 20 ML VIAL: Performed by: ORTHOPAEDIC SURGERY

## 2021-07-13 PROCEDURE — 25010000002 FENTANYL CITRATE (PF) 50 MCG/ML SOLUTION: Performed by: NURSE ANESTHETIST, CERTIFIED REGISTERED

## 2021-07-13 PROCEDURE — 87070 CULTURE OTHR SPECIMN AEROBIC: CPT | Performed by: ORTHOPAEDIC SURGERY

## 2021-07-13 RX ORDER — CEFAZOLIN SODIUM 2 G/100ML
2 INJECTION, SOLUTION INTRAVENOUS ONCE
Status: COMPLETED | OUTPATIENT
Start: 2021-07-13 | End: 2021-07-13

## 2021-07-13 RX ORDER — IPRATROPIUM BROMIDE AND ALBUTEROL SULFATE 2.5; .5 MG/3ML; MG/3ML
3 SOLUTION RESPIRATORY (INHALATION) ONCE AS NEEDED
Status: DISCONTINUED | OUTPATIENT
Start: 2021-07-13 | End: 2021-07-13 | Stop reason: HOSPADM

## 2021-07-13 RX ORDER — HYDROCODONE BITARTRATE AND ACETAMINOPHEN 7.5; 325 MG/1; MG/1
1-2 TABLET ORAL EVERY 6 HOURS PRN
Qty: 30 TABLET | Refills: 0 | Status: SHIPPED | OUTPATIENT
Start: 2021-07-13 | End: 2021-08-13

## 2021-07-13 RX ORDER — DEXAMETHASONE SODIUM PHOSPHATE 4 MG/ML
INJECTION, SOLUTION INTRA-ARTICULAR; INTRALESIONAL; INTRAMUSCULAR; INTRAVENOUS; SOFT TISSUE AS NEEDED
Status: DISCONTINUED | OUTPATIENT
Start: 2021-07-13 | End: 2021-07-13 | Stop reason: SURG

## 2021-07-13 RX ORDER — FENTANYL CITRATE 50 UG/ML
INJECTION, SOLUTION INTRAMUSCULAR; INTRAVENOUS AS NEEDED
Status: DISCONTINUED | OUTPATIENT
Start: 2021-07-13 | End: 2021-07-13 | Stop reason: SURG

## 2021-07-13 RX ORDER — MAGNESIUM HYDROXIDE 1200 MG/15ML
LIQUID ORAL AS NEEDED
Status: DISCONTINUED | OUTPATIENT
Start: 2021-07-13 | End: 2021-07-13 | Stop reason: HOSPADM

## 2021-07-13 RX ORDER — SODIUM CHLORIDE 0.9 % (FLUSH) 0.9 %
10 SYRINGE (ML) INJECTION AS NEEDED
Status: DISCONTINUED | OUTPATIENT
Start: 2021-07-13 | End: 2021-07-13 | Stop reason: HOSPADM

## 2021-07-13 RX ORDER — FENTANYL CITRATE 50 UG/ML
50 INJECTION, SOLUTION INTRAMUSCULAR; INTRAVENOUS
Status: DISCONTINUED | OUTPATIENT
Start: 2021-07-13 | End: 2021-07-13 | Stop reason: HOSPADM

## 2021-07-13 RX ORDER — PROPOFOL 10 MG/ML
VIAL (ML) INTRAVENOUS AS NEEDED
Status: DISCONTINUED | OUTPATIENT
Start: 2021-07-13 | End: 2021-07-13 | Stop reason: SURG

## 2021-07-13 RX ORDER — POLYMYXIN B 500000 [USP'U]/1
INJECTION, POWDER, LYOPHILIZED, FOR SOLUTION INTRAMUSCULAR; INTRATHECAL; INTRAVENOUS; OPHTHALMIC AS NEEDED
Status: DISCONTINUED | OUTPATIENT
Start: 2021-07-13 | End: 2021-07-13 | Stop reason: HOSPADM

## 2021-07-13 RX ORDER — MIDAZOLAM HYDROCHLORIDE 1 MG/ML
0.5 INJECTION INTRAMUSCULAR; INTRAVENOUS
Status: DISCONTINUED | OUTPATIENT
Start: 2021-07-13 | End: 2021-07-13 | Stop reason: HOSPADM

## 2021-07-13 RX ORDER — ACETAMINOPHEN 650 MG/1
650 SUPPOSITORY RECTAL ONCE AS NEEDED
Status: DISCONTINUED | OUTPATIENT
Start: 2021-07-13 | End: 2021-07-13 | Stop reason: HOSPADM

## 2021-07-13 RX ORDER — ONDANSETRON 2 MG/ML
4 INJECTION INTRAMUSCULAR; INTRAVENOUS ONCE AS NEEDED
Status: DISCONTINUED | OUTPATIENT
Start: 2021-07-13 | End: 2021-07-13 | Stop reason: HOSPADM

## 2021-07-13 RX ORDER — ONDANSETRON 4 MG/1
4 TABLET, FILM COATED ORAL EVERY 6 HOURS PRN
Qty: 30 TABLET | Refills: 0 | Status: SHIPPED | OUTPATIENT
Start: 2021-07-13 | End: 2021-08-13

## 2021-07-13 RX ORDER — ACETAMINOPHEN 325 MG/1
650 TABLET ORAL ONCE AS NEEDED
Status: DISCONTINUED | OUTPATIENT
Start: 2021-07-13 | End: 2021-07-13 | Stop reason: HOSPADM

## 2021-07-13 RX ORDER — FAMOTIDINE 20 MG/1
20 TABLET, FILM COATED ORAL
Status: COMPLETED | OUTPATIENT
Start: 2021-07-13 | End: 2021-07-13

## 2021-07-13 RX ORDER — LIDOCAINE HYDROCHLORIDE 10 MG/ML
0.5 INJECTION, SOLUTION EPIDURAL; INFILTRATION; INTRACAUDAL; PERINEURAL ONCE AS NEEDED
Status: COMPLETED | OUTPATIENT
Start: 2021-07-13 | End: 2021-07-13

## 2021-07-13 RX ORDER — SODIUM CHLORIDE, SODIUM LACTATE, POTASSIUM CHLORIDE, CALCIUM CHLORIDE 600; 310; 30; 20 MG/100ML; MG/100ML; MG/100ML; MG/100ML
9 INJECTION, SOLUTION INTRAVENOUS CONTINUOUS PRN
Status: DISCONTINUED | OUTPATIENT
Start: 2021-07-13 | End: 2021-07-13 | Stop reason: HOSPADM

## 2021-07-13 RX ORDER — OXYCODONE HYDROCHLORIDE AND ACETAMINOPHEN 5; 325 MG/1; MG/1
1 TABLET ORAL ONCE AS NEEDED
Status: DISCONTINUED | OUTPATIENT
Start: 2021-07-13 | End: 2021-07-13 | Stop reason: HOSPADM

## 2021-07-13 RX ORDER — SODIUM CHLORIDE 0.9 % (FLUSH) 0.9 %
10 SYRINGE (ML) INJECTION EVERY 12 HOURS SCHEDULED
Status: DISCONTINUED | OUTPATIENT
Start: 2021-07-13 | End: 2021-07-13 | Stop reason: HOSPADM

## 2021-07-13 RX ORDER — ONDANSETRON 2 MG/ML
INJECTION INTRAMUSCULAR; INTRAVENOUS AS NEEDED
Status: DISCONTINUED | OUTPATIENT
Start: 2021-07-13 | End: 2021-07-13 | Stop reason: SURG

## 2021-07-13 RX ORDER — OXYCODONE AND ACETAMINOPHEN 7.5; 325 MG/1; MG/1
2 TABLET ORAL EVERY 4 HOURS PRN
Status: DISCONTINUED | OUTPATIENT
Start: 2021-07-13 | End: 2021-07-13 | Stop reason: HOSPADM

## 2021-07-13 RX ORDER — HYDROMORPHONE HYDROCHLORIDE 1 MG/ML
0.5 INJECTION, SOLUTION INTRAMUSCULAR; INTRAVENOUS; SUBCUTANEOUS
Status: DISCONTINUED | OUTPATIENT
Start: 2021-07-13 | End: 2021-07-13 | Stop reason: HOSPADM

## 2021-07-13 RX ADMIN — FAMOTIDINE 20 MG: 20 TABLET ORAL at 07:09

## 2021-07-13 RX ADMIN — LIDOCAINE HYDROCHLORIDE 0.5 ML: 10 INJECTION, SOLUTION EPIDURAL; INFILTRATION; INTRACAUDAL; PERINEURAL at 06:55

## 2021-07-13 RX ADMIN — ONDANSETRON 4 MG: 2 INJECTION INTRAMUSCULAR; INTRAVENOUS at 07:49

## 2021-07-13 RX ADMIN — PROPOFOL 100 MG: 10 INJECTION, EMULSION INTRAVENOUS at 07:36

## 2021-07-13 RX ADMIN — SODIUM CHLORIDE, POTASSIUM CHLORIDE, SODIUM LACTATE AND CALCIUM CHLORIDE 9 ML/HR: 600; 310; 30; 20 INJECTION, SOLUTION INTRAVENOUS at 06:55

## 2021-07-13 RX ADMIN — PROPOFOL 140 MCG/KG/MIN: 10 INJECTION, EMULSION INTRAVENOUS at 07:36

## 2021-07-13 RX ADMIN — FENTANYL CITRATE 50 MCG: 50 INJECTION, SOLUTION INTRAMUSCULAR; INTRAVENOUS at 07:32

## 2021-07-13 RX ADMIN — DEXAMETHASONE SODIUM PHOSPHATE 4 MG: 4 INJECTION, SOLUTION INTRA-ARTICULAR; INTRALESIONAL; INTRAMUSCULAR; INTRAVENOUS; SOFT TISSUE at 07:49

## 2021-07-13 RX ADMIN — CEFAZOLIN SODIUM 2 G: 10 INJECTION, POWDER, FOR SOLUTION INTRAVENOUS at 07:42

## 2021-07-13 RX ADMIN — FENTANYL CITRATE 50 MCG: 50 INJECTION, SOLUTION INTRAMUSCULAR; INTRAVENOUS at 07:36

## 2021-07-13 NOTE — ANESTHESIA POSTPROCEDURE EVALUATION
Patient: Marci Snyder    Procedure Summary     Date: 07/13/21 Room / Location:  [facility]    Anesthesia Start: 0732 Anesthesia Stop: 0823    Procedure: amputate right 3rd toe at PIP joint (Right Fingers) Diagnosis:       Osteomyelitis of third toe of right foot (CMS/HCC)      (Osteomyelitis of third toe of right foot (CMS/HCC) [M86.9])    Surgeons: Gwendolyn Del Rosario MD Provider: Paco Worthington MD    Anesthesia Type: general ASA Status: 3          Anesthesia Type: general    Vitals  No vitals data found for the desired time range.          Post Anesthesia Care and Evaluation    Patient location during evaluation: PACU  Patient participation: complete - patient participated  Level of consciousness: awake and alert  Pain management: adequate  Airway patency: patent  Anesthetic complications: No anesthetic complications  PONV Status: none  Cardiovascular status: hemodynamically stable and acceptable  Respiratory status: nonlabored ventilation, acceptable and nasal cannula  Hydration status: acceptable

## 2021-07-13 NOTE — BRIEF OP NOTE
Orthopedics amputate right 3rd toe at PIP joint  Brief Op Note    Marci Snyder  7/13/2021    Pre-op Diagnosis:   Osteomyelitis of third toe of right foot (CMS/HCC) [M86.9] distal phalanx    Post-op Diagnosis:  same       Post-Op Diagnosis Codes:     * Osteomyelitis of third toe of right foot (CMS/HCC) [M86.9]    Procedure(s):  amputate right 3rd toe at PIP joint    Surgeon(s):  Gwendolyn Del Rosario MD    Circulator: Lane Beard RN  Scrub Person: Cherise Medrano  Assistant: Magaly Plascencia PA-C     Assistant: Magaly Plascencia PA-C  was responsible for performing the following activities: Retraction, Suction, Irrigation, Suturing, Closing and Placing Dressing and their skilled assistance was necessary for the success of this case.    Anesthesia:  Monitored Anesthesia Care    Staff:   Circulator: Lane Beard RN  Scrub Person: Cherise Medrano  Assistant: Magaly Plascencia PA-C    Estimated Blood Loss:5cc      Specimens: culture and permanent      Drains:  none    Complications:  None    Tourniquet:: none      Dressing:soft    Disposition:rr stable    Gwendolyn Del Rosario MD     Date: 7/13/2021  Time: 08:00 EDT

## 2021-07-13 NOTE — OP NOTE
Operative Report    07/13/21  08:01 EDT    Preoperative diagnosis: Osteomyelitis right third toe distal phalanx    Postoperative diagnosis: Same    Anesthesia: General with blocks for postop pain control    Surgeon: Gwendolyn Huff M.D.    Assistant: Magaly FERNANDO, present for the entire procedure including prepping, draping, retraction, closure, dressing.    Operative procedure: Amputation right third toe through PIP joint    Operative indications: This is an extremely pleasant 70-year-old woman with greater than 4-month history of redness and swelling in the distal aspect of the right third toe.  She has had ulceration and drainage.  Currently there is no drainage but the toe remains red and enlarged.  MRI shows osteomyelitis of the distal phalanx of the toe.  She does not have any systemic symptoms.    Operative procedure: The patient was taken to the operating room where she was given sedation.  She was given preoperative antibiotics.  The first timeout was called and I placed a digital block in the right third toe using 10 cc of half percent ropivacaine and 1% lidocaine.  The right leg was then prepped and draped in the usual sterile fashion.  The appropriate timeout was called.  I made a fishmouth shaped incision over the middle phalanx of the third toe.  This was carried down through soft tissues sharply.  The vessels were cauterized.  The nerves were placed on stretch transected sharply and allowed to retract proximally.  The toe was removed through the PIP joint.  There was no signs of infection at this level.  On the back table I opened the toe and obtain deep cultures per routine.  These instruments were not reused.  The incision was irrigated copiously with antibiotic solution using pulsatile lavage.  Incision was closed with nylon.  The foot was dressed sterilely.  She was awakened and transferred to recovery in stable condition.  Postop plan will be discharged home weightbearing on her heel in a  postop shoe.    Estimated blood loss: 5 cc    Specimens: Cultures and permanent    Drains: None    Complications: None    Gwendolyn Del Rosario MD  07/13/21  08:01 EDT

## 2021-07-13 NOTE — INTERVAL H&P NOTE
"[facility] Pre-op    Full history and physical note from office is attached.    Of note, she does have a left knee abrasion from a fall earlier this week.    /60 (BP Location: Right arm, Patient Position: Lying)   Pulse 69   Temp 97.5 °F (36.4 °C) (Temporal)   Resp 18   Ht 172.7 cm (68\")   Wt 70.8 kg (156 lb)   LMP  (LMP Unknown)   SpO2 97%   BMI 23.72 kg/m²     Immunizations:  Influenza:  2020  Pneumococcal:  UTD  Tetanus:  Unknown  Covid x2: 2021    LAB Results:  Lab Results   Component Value Date    WBC 4.54 07/08/2021    HGB 13.3 07/08/2021    HCT 41.5 07/08/2021    MCV 94.1 07/08/2021     07/08/2021    NEUTROABS 2.13 07/08/2021    GLUCOSE 72 07/08/2021    BUN 20 07/08/2021    CREATININE 0.98 07/08/2021    EGFRIFNONA 56 (L) 07/08/2021    EGFRIFAFRI 66 10/08/2019     07/08/2021    K 4.6 07/08/2021     07/08/2021    CO2 28.0 07/08/2021    MG 2.3 10/08/2019    CALCIUM 9.6 07/08/2021    ALBUMIN 4.60 10/08/2019    AST 23 10/08/2019    ALT 23 10/08/2019    BILITOT 0.2 10/08/2019     6/11/21 MRI right foot:  IMPRESSION:  1. Diffuse edema, replacement of marrow fat signal and diffuse  enhancement of the distal phalanx of the third digit, suspicious for  underlying osteomyelitis. No evidence of abscess.     2. No evidence of potential osteomyelitis, acute or healing trauma  elsewhere.     3. Extensive changes of first MTP joint DJD incidentally noted.    Cancer Staging (if applicable)  Cancer Patient: __ yes __no __unknown__N/A; If yes, clinical stage T:__ N:__M:__, stage group or __N/A      Impression: Osteomyelitis of third toe of right foot      Plan: amputate right 3rd toe at PIP joint      CECY Samuels   7/13/2021   06:53 EDT   "

## 2021-07-13 NOTE — ANESTHESIA PREPROCEDURE EVALUATION
Anesthesia Evaluation     Patient summary reviewed and Nursing notes reviewed   no history of anesthetic complications:  NPO Solid Status: > 8 hours  NPO Liquid Status: > 2 hours           Airway   Mallampati: III  TM distance: >3 FB  Neck ROM: full  Possible difficult intubation  Dental    (+) implants        Pulmonary     breath sounds clear to auscultation  (+) sleep apnea on CPAP,   Cardiovascular   Exercise tolerance: good (4-7 METS)    ECG reviewed  Rhythm: regular  Rate: normal        Neuro/Psych  (+) headaches, numbness, psychiatric history Depression,     GI/Hepatic/Renal/Endo    (+)   thyroid problem hypothyroidism    Musculoskeletal     Abdominal   (-) obese    Abdomen: soft.   Substance History      OB/GYN          Other   arthritis,                      Anesthesia Plan    ASA 3     general     intravenous induction     Anesthetic plan, all risks, benefits, and alternatives have been provided, discussed and informed consent has been obtained with: patient.    Plan discussed with CRNA.

## 2021-07-13 NOTE — DISCHARGE INSTRUCTIONS
1. Weight bear on heel in post op shoe  2. Elevate operated foot over heart  3. Change the dressing every 4 days, thin layer Bactroban cream (in chart) gauze and ace bandage, do not get the incision(s) wet.    4. Call the office if any problems: [phone]  5. Take the Zofran with the pain meds if you have nausea/vomiting  6. Take 81mg aspirin per day to help prevent blood clots

## 2021-07-15 LAB
CYTO UR: NORMAL
LAB AP CASE REPORT: NORMAL
LAB AP CLINICAL INFORMATION: NORMAL
PATH REPORT.FINAL DX SPEC: NORMAL
PATH REPORT.GROSS SPEC: NORMAL

## 2021-07-16 LAB
BACTERIA SPEC AEROBE CULT: NORMAL
GRAM STN SPEC: NORMAL

## 2021-07-18 LAB — BACTERIA SPEC ANAEROBE CULT: NORMAL

## 2021-07-28 ENCOUNTER — OFFICE VISIT (OUTPATIENT)
Dept: ORTHOPEDIC SURGERY | Facility: CLINIC | Age: 71
End: 2021-07-28

## 2021-07-28 VITALS — TEMPERATURE: 97.1 F

## 2021-07-28 DIAGNOSIS — M86.9 OSTEOMYELITIS OF THIRD TOE OF RIGHT FOOT (HCC): Primary | ICD-10-CM

## 2021-07-28 PROCEDURE — 99024 POSTOP FOLLOW-UP VISIT: CPT | Performed by: ORTHOPAEDIC SURGERY

## 2021-07-28 RX ORDER — AZITHROMYCIN 250 MG/1
250 TABLET, FILM COATED ORAL DAILY
COMMUNITY
End: 2021-08-13

## 2021-07-28 NOTE — PROGRESS NOTES
Post-op (2 weeks s/p  (R) FOOT AMPUTATE 3RD TOE AT PIP JOINT 07/13/2021 )      Marci Snyder is 2 weeks status post amputate right third toe 7/13/2021. She reports no fever, chills.  She reports pain is well controlled.  They have been taking aspirin for DVT prophylaxis.  They have been weight bearing on heel in post op shoe.      Past Surgical History:   Procedure Laterality Date   • AMPUTATION DIGIT Right 7/13/2021    Procedure: AMPUTATE 3RD TOE AT PIP JOINT RIGHT;  Surgeon: Gwendolyn Del Rosario MD;  Location: [facility];  Service: Orthopedics;  Laterality: Right;   • COLONOSCOPY     • FOOT SURGERY Right     pinched nerve   • FOOT SURGERY Left 01/17/2019    1st MPJ fusion - Dr. Gwendolyn Del Rosario ; [facility]    • NOSE SURGERY  1985    DEVIATED SEPTUM       Temp 97.1 °F (36.2 °C)   LMP  (LMP Unknown)         Good alignment, no erythema, no drainage, no sign of infection, normal post op swelling right foot    reviewed prior lab results    Assessment and Plan:   1. Osteomyelitis of third toe of right foot (CMS/HCC)  She is doing well, the pathology report confirms osteomyelitis, but we remove the whole focus.  We removed her sutures.  She may take a shower.  No soaking until all the scabs are gone.  She may gradually get back into a shoe.  I will see her as needed      \    Gwendolyn Del Rosario MD

## 2021-08-05 ENCOUNTER — CLINICAL SUPPORT (OUTPATIENT)
Dept: ORTHOPEDIC SURGERY | Facility: CLINIC | Age: 71
End: 2021-08-05

## 2021-08-05 DIAGNOSIS — M17.11 PRIMARY OSTEOARTHRITIS OF RIGHT KNEE: Primary | ICD-10-CM

## 2021-08-05 PROCEDURE — 20610 DRAIN/INJ JOINT/BURSA W/O US: CPT | Performed by: ORTHOPAEDIC SURGERY

## 2021-08-05 NOTE — PROGRESS NOTES
Procedure   Large Joint Arthrocentesis: R knee  Date/Time: 8/5/2021 2:51 PM  Consent given by: patient  Site marked: site marked  Timeout: Immediately prior to procedure a time out was called to verify the correct patient, procedure, equipment, support staff and site/side marked as required   Supporting Documentation  Indications: pain   Procedure Details  Location: knee - R knee  Preparation: Patient was prepped and draped in the usual sterile fashion  Needle size: 23 G  Approach: anterolateral  Medications administered: 30 mg Hyaluronan 30 MG/2ML  Patient tolerance: patient tolerated the procedure well with no immediate complications

## 2021-08-05 NOTE — PROGRESS NOTES
Patient returns for initiation right knee Orthovisc injections.  Denies interval changes since her last visit.    Procedure Note:  I discussed with the patient the potential benefits of performing a therapeutic injection of the right knee as well as potential risks including but not limited to infection, swelling, pain, bleeding, bruising, nerve/vessel damage, pseudoseptic reaction, and worsening joint pain. After informed consent and verifying correct patient, procedure site, and type of procedure, the area was prepped with alcohol, ethyl chloride was used to numb the skin. Via the superolateral approach, the viscosupplementation syringe contents were injected into the right knee. The patient tolerated the procedure well. There were no complications. A sterile dressing was placed over the injection site.    Follow up 1 week.

## 2021-08-12 ENCOUNTER — CLINICAL SUPPORT (OUTPATIENT)
Dept: ORTHOPEDIC SURGERY | Facility: CLINIC | Age: 71
End: 2021-08-12

## 2021-08-12 DIAGNOSIS — M17.11 PRIMARY OSTEOARTHRITIS OF RIGHT KNEE: Primary | ICD-10-CM

## 2021-08-12 PROCEDURE — 20610 DRAIN/INJ JOINT/BURSA W/O US: CPT | Performed by: ORTHOPAEDIC SURGERY

## 2021-08-12 NOTE — PROGRESS NOTES
Patient returns for second Orthovisc injection the right knee.  Denies complications with the first injection.  Overall her pain has improved.    Procedure Note:  I discussed with the patient the potential benefits of performing a therapeutic injection of the right knee as well as potential risks including but not limited to infection, swelling, pain, bleeding, bruising, nerve/vessel damage, pseudoseptic reaction, and worsening joint pain. After informed consent and verifying correct patient, procedure site, and type of procedure, the area was prepped with alcohol, ethyl chloride was used to numb the skin. Via the superolateral approach, the viscosupplementation syringe contents were injected into the right knee. The patient tolerated the procedure well. There were no complications. A sterile dressing was placed over the injection site.    Follow up 1 week.

## 2021-08-12 NOTE — PROGRESS NOTES
Procedure   Large Joint Arthrocentesis: R knee  Date/Time: 8/12/2021 3:01 PM  Consent given by: patient  Site marked: site marked  Timeout: Immediately prior to procedure a time out was called to verify the correct patient, procedure, equipment, support staff and site/side marked as required   Supporting Documentation  Indications: pain   Procedure Details  Location: knee - R knee  Preparation: Patient was prepped and draped in the usual sterile fashion  Needle size: 23 G  Approach: anterolateral  Medications administered: 30 mg Hyaluronan 30 MG/2ML  Patient tolerance: patient tolerated the procedure well with no immediate complications

## 2021-08-13 ENCOUNTER — TRANSCRIBE ORDERS (OUTPATIENT)
Dept: FAMILY MEDICINE CLINIC | Facility: CLINIC | Age: 71
End: 2021-08-13

## 2021-08-13 ENCOUNTER — OFFICE VISIT (OUTPATIENT)
Dept: NEUROSURGERY | Facility: CLINIC | Age: 71
End: 2021-08-13

## 2021-08-13 ENCOUNTER — OFFICE VISIT (OUTPATIENT)
Dept: FAMILY MEDICINE CLINIC | Facility: CLINIC | Age: 71
End: 2021-08-13

## 2021-08-13 VITALS
WEIGHT: 153 LBS | TEMPERATURE: 97.2 F | SYSTOLIC BLOOD PRESSURE: 138 MMHG | HEIGHT: 69 IN | OXYGEN SATURATION: 98 % | BODY MASS INDEX: 22.66 KG/M2 | HEART RATE: 80 BPM | DIASTOLIC BLOOD PRESSURE: 82 MMHG

## 2021-08-13 VITALS
TEMPERATURE: 97.1 F | HEIGHT: 69 IN | HEART RATE: 96 BPM | BODY MASS INDEX: 22.33 KG/M2 | SYSTOLIC BLOOD PRESSURE: 112 MMHG | WEIGHT: 150.8 LBS | DIASTOLIC BLOOD PRESSURE: 66 MMHG | RESPIRATION RATE: 20 BRPM

## 2021-08-13 DIAGNOSIS — M79.642 LEFT HAND PAIN: ICD-10-CM

## 2021-08-13 DIAGNOSIS — M72.0 DUPUYTREN'S CONTRACTURE OF LEFT HAND: ICD-10-CM

## 2021-08-13 DIAGNOSIS — M72.0 DUPUYTREN CONTRACTURE: Primary | ICD-10-CM

## 2021-08-13 DIAGNOSIS — M54.2 CHRONIC NECK PAIN WITH ABNORMAL NEUROLOGIC EXAMINATION: Primary | ICD-10-CM

## 2021-08-13 DIAGNOSIS — M54.2 CHRONIC NECK PAIN: Primary | ICD-10-CM

## 2021-08-13 DIAGNOSIS — M47.812 SPONDYLOSIS OF CERVICAL REGION WITHOUT MYELOPATHY OR RADICULOPATHY: ICD-10-CM

## 2021-08-13 DIAGNOSIS — G62.9 NEUROPATHY: ICD-10-CM

## 2021-08-13 DIAGNOSIS — G89.29 CHRONIC NECK PAIN WITH ABNORMAL NEUROLOGIC EXAMINATION: Primary | ICD-10-CM

## 2021-08-13 DIAGNOSIS — G89.29 CHRONIC NECK PAIN: Primary | ICD-10-CM

## 2021-08-13 PROCEDURE — 99213 OFFICE O/P EST LOW 20 MIN: CPT | Performed by: PHYSICIAN ASSISTANT

## 2021-08-13 PROCEDURE — 99204 OFFICE O/P NEW MOD 45 MIN: CPT | Performed by: NEUROLOGICAL SURGERY

## 2021-08-13 NOTE — PROGRESS NOTES
Subjective   Marci Snyder is a 70 y.o. female  Hand Pain (fell 3 weeks ago and injured left hand, seen by Dr. Cobos office last week, req second opinion ) and Med Refill (med refill on neck pain, req refill on Tramadol and Volteral gel)      History of Present Illness  Patient is a pleasant 70-year-old female who comes in today for evaluation and treatment of 2 separate medical conditions she is following up on pain management in association with chronic neck pain due to degenerative disc disease currently treated with tramadol daily and Voltaren gel.  Pain level is tolerable at this point she is able to carry out ADLs on this medication regimen denies any adverse effects from it.  Second issue is a new problem she states few weeks ago she tripped and fell hitting her left hand so she went to see an orthopedic hand surgeon for evaluation was told it was not fracture there is nothing acute going on but that she had Dupuytren's contracture.  She was given an injection she would like a second opinion with another hand surgeon as she is continuing to have some discomfort in her hand.  The following portions of the patient's history were reviewed and updated as appropriate: allergies, current medications, past social history and problem list    Review of Systems   Constitutional: Positive for activity change.   HENT: Negative for sore throat, trouble swallowing and voice change.    Respiratory: Negative for shortness of breath.    Cardiovascular: Negative.    Musculoskeletal: Positive for arthralgias, myalgias, neck pain and neck stiffness. Negative for gait problem and joint swelling.   Skin: Negative.  Negative for rash and wound.   Neurological: Negative for weakness and numbness.   Hematological: Negative for adenopathy.       Objective     Vitals:    08/13/21 1432   BP: 138/82   Pulse: 80   Temp: 97.2 °F (36.2 °C)   SpO2: 98%       Physical Exam  Vitals and nursing note reviewed.   Constitutional:        General: She is not in acute distress.     Appearance: Normal appearance. She is well-developed. She is not ill-appearing, toxic-appearing or diaphoretic.   HENT:      Head: Normocephalic and atraumatic.   Neck:      Thyroid: No thyromegaly.      Vascular: No JVD.      Trachea: No tracheal deviation.   Pulmonary:      Effort: Pulmonary effort is normal.      Breath sounds: Normal breath sounds. No stridor.   Musculoskeletal:         General: Tenderness ( Palm of left hand) present. No deformity.      Cervical back: Spinous process tenderness and muscular tenderness present. Decreased range of motion.   Lymphadenopathy:      Cervical: No cervical adenopathy.   Skin:     General: Skin is warm and dry.      Coloration: Skin is not pale.      Findings: No erythema or rash.   Neurological:      Mental Status: She is alert.      Motor: No abnormal muscle tone.      Coordination: Coordination normal.         Assessment/Plan     Diagnoses and all orders for this visit:    1. Chronic neck pain with abnormal neurologic examination (Primary)    2. Spondylosis of cervical region without myelopathy or radiculopathy    3. Neuropathy    4. Dupuytren's contracture of left hand       Referral placed for second opinion at patient's request to hand orthopedist Dr. José Manuel Tobin.    + Refill given current dosage of tramadol 50 mg tablets take 2 tabs 4 times daily #240 with 5 refills.  Follow-up in 6 months for recheck.    As part of this patient's treatment plan, patient will be prescribed controlled substances. The patient has been made aware of appropriate use of such medications, including potential risk of somnolence, limited ability to drive and /or work safely, and potential for dependence or overdose. It has also been made clear that these medications are for use by this patient only, without concomitant use of alcohol or other substances unless prescribed.Controlled substance status of medication discussed with patient,  discussed risks of medication including abuse potential and diversion potential and need to follow up for reevaluation appointment in order to receive further refills.    Please note that portions of this document were completed with a voice recognition program. Efforts were made to edit the dictations, but occasionally words are mis-transcribed

## 2021-08-13 NOTE — PROGRESS NOTES
Subjective     Chief Complaint: Neck pain    Patient ID: Marci Snyder is a 70 y.o. female seen for consultation today at the request of  Saranya Terrell PA-C    History of Present Illness    This is a 70-year-old woman who presents to my office with chief complaints of a longstanding history of neck pain.  She had an exacerbation several months ago which was also characterized by loss of sensation along the posterior aspect of her neck.  She denies any gait instability.  She denies bladder/bowel incontinence.  She denies radiating pain into her shoulders, arms, or hands.  She does not have much in the way of medical comorbidities and reports that she is very active normally speaking.  She enjoys riding her bike and she participates in the [employer] program during the winter months.    The following portions of the patient's history were reviewed and updated as appropriate: allergies, current medications, past family history, past medical history, past social history, past surgical history and problem list.    Family history:   Family History   Problem Relation Age of Onset   • Stroke Mother    • Cancer Father    • Breast cancer Sister 73   • Breast cancer Paternal Aunt         DX AGE UNKNOWN   • Breast cancer Cousin         SEVERAL COUSINS - DX AGES UNKNOWN   • Breast cancer Paternal Aunt         DX AGE UNKNOWN   • Ovarian cancer Neg Hx        Social history:   Social History     Socioeconomic History   • Marital status:      Spouse name: Not on file   • Number of children: Not on file   • Years of education: Not on file   • Highest education level: Not on file   Tobacco Use   • Smoking status: Never Smoker   • Smokeless tobacco: Never Used   Vaping Use   • Vaping Use: Never used   Substance and Sexual Activity   • Alcohol use: Yes     Comment: social   • Drug use: No   • Sexual activity: Yes       Review of Systems   Constitutional: Negative for activity change, appetite change, chills,  "diaphoresis, fatigue, fever and unexpected weight change.   HENT: Negative for congestion, dental problem, drooling, ear discharge, ear pain, facial swelling, hearing loss, mouth sores, nosebleeds, postnasal drip, rhinorrhea, sinus pressure, sinus pain, sneezing, sore throat, tinnitus, trouble swallowing and voice change.    Eyes: Negative for photophobia, pain, discharge, redness, itching and visual disturbance.   Respiratory: Negative for apnea, cough, choking, chest tightness, shortness of breath, wheezing and stridor.    Cardiovascular: Negative for chest pain, palpitations and leg swelling.   Gastrointestinal: Negative for abdominal distention, abdominal pain, anal bleeding, blood in stool, constipation, diarrhea, nausea, rectal pain and vomiting.   Endocrine: Negative for cold intolerance, heat intolerance, polydipsia, polyphagia and polyuria.   Genitourinary: Negative for decreased urine volume, difficulty urinating, dysuria, enuresis, flank pain, frequency, genital sores, hematuria and urgency.   Musculoskeletal: Negative for arthralgias, back pain, gait problem, joint swelling, myalgias, neck pain and neck stiffness.   Skin: Negative for color change, pallor, rash and wound.   Allergic/Immunologic: Positive for environmental allergies. Negative for food allergies and immunocompromised state.   Neurological: Positive for numbness. Negative for dizziness, tremors, seizures, syncope, facial asymmetry, speech difficulty, weakness, light-headedness and headaches.   Hematological: Negative for adenopathy. Does not bruise/bleed easily.   Psychiatric/Behavioral: Negative for agitation, behavioral problems, confusion, decreased concentration, dysphoric mood, hallucinations, self-injury, sleep disturbance and suicidal ideas. The patient is not nervous/anxious and is not hyperactive.        Objective   Blood pressure 112/66, pulse 96, temperature 97.1 °F (36.2 °C), resp. rate 20, height 174 cm (68.5\"), weight 68.4 kg " (150 lb 12.8 oz), not currently breastfeeding.  Body mass index is 22.6 kg/m².    Physical Exam  Vitals and nursing note reviewed.   Constitutional:       Appearance: She is well-developed. She is not toxic-appearing.   HENT:      Head: Normocephalic and atraumatic.      Right Ear: Hearing normal.      Left Ear: Hearing normal.      Nose: Nose normal.   Eyes:      General: Lids are normal.      Conjunctiva/sclera: Conjunctivae normal.      Pupils: Pupils are equal, round, and reactive to light.   Neck:      Vascular: No JVD.   Cardiovascular:      Rate and Rhythm: Normal rate and regular rhythm.      Pulses:           Radial pulses are 2+ on the right side and 2+ on the left side.   Pulmonary:      Effort: Pulmonary effort is normal. No respiratory distress.      Breath sounds: No stridor. No wheezing.   Musculoskeletal:      Right shoulder: Tenderness present. Decreased range of motion.      Left shoulder: No tenderness. Decreased range of motion.      Cervical back: Normal range of motion.      Comments: No focal motor weakness is detected neither proximally nor distally in her upper extremities, however she does have significant right shoulder pain which manifests as pain limited deltoid motor examination on the right side   Skin:     General: Skin is warm and dry.      Findings: No erythema or rash.   Neurological:      Mental Status: She is alert and oriented to person, place, and time.      GCS: GCS eye subscore is 4. GCS verbal subscore is 5. GCS motor subscore is 6.      Cranial Nerves: No cranial nerve deficit.      Motor: No abnormal muscle tone.      Coordination: Romberg sign negative.      Gait: Gait is intact. Gait and tandem walk normal.      Deep Tendon Reflexes: Reflexes normal.      Reflex Scores:       Bicep reflexes are 1+ on the right side and 1+ on the left side.       Brachioradialis reflexes are 1+ on the right side and 1+ on the left side.     Comments: Muscle stretch reflexes are symmetric  and hyporeactive.  Casual and tandem gait are performed unremarkably.  Station is unbroken.    Christine sign is absent.   Psychiatric:         Behavior: Behavior normal.         Thought Content: Thought content normal.         Judgment: Judgment normal.         Assessment/Plan     Independent Review of Radiographic Studies:      Available for my review is a MRI of the cervical spine which was performed on June 11, 2021.  This demonstrates advanced degenerative disc disease and facet arthropathy present throughout the subaxial spine.  There is a very large facet spur and hypertrophied ligamentum flavum eccentric to the left side at C4-5 which does encroach upon the central canal to a moderate, bordering on severe degree.  There is complete disc space collapse a t C5-6 with a broad-based disc osteophyte complex which narrows the central canal to a moderate degree.  Cervical lordosis is somewhat decreased.  There is no abnormal signal within the spinal cord parenchyma to suggest myelomalacia.    Medical Decision Making:      This is a 70-year-old woman with chronic neck pain.  She has arthritis but does not have any clinical complaints of cervical radiculopathy and does not have any objective findings of cervical myelopathy.  She is an excellent candidate for pain management and physical therapy.  I reviewed the signs and symptoms of cervical radiculopathy as well as cervical myelopathy with her.  I directed her to contact my office with new or worsening symptoms, otherwise she can follow-up with me on an as-needed basis.  She deferred having a formal referral to physical therapy and would like to try some exercises on her own but she is interested in starting with some injections.  If she fails to improve with injections and home exercises then I think it is reasonable to try physical therapy, however at this point there is no role for surgical intervention unless her symptoms change dramatically.    Diagnoses and all  orders for this visit:    1. Chronic neck pain (Primary)  -     Ambulatory Referral to Pain Management        No follow-ups on file.           This document signed by TAMMI Marcus MD August 13, 2021 13:28 EDT

## 2021-08-16 NOTE — TELEPHONE ENCOUNTER
Caller: Marci Snyder    Relationship: Self    Best call back number: [phone] (H)    Medication needed:   Diclofenac Sodium (VOLTAREN) 1 % gel gel  2 g, 2 Times Daily 2 ordered         Summary: Apply 2 g topically to the appropriate area as directed 2 (Two) Times a Day. For neck pain, Starting Thu 5/20/2021, Normal   Dose, Route, Frequency: 2 g, Topical, 2 Times Daily          When do you need the refill by: TODAY    What additional details did the patient provide when requesting the medication:   PATIENT STATES THAT SHE IS OUT OF THE MEDICATION     Does the patient have less than a 3 day supply:  [x] Yes  [] No    What is the patient's preferred pharmacy:    [id number] [place] [address] [address] AT [facility] [phone] [facility] [phone] [place]      PATIENT HAS BEEN ADVISED THAT THIS REQUEST HAS BEEN MARKED AS A HIGH PRIORITY TO ALLOW 48 HOURS FOR THE CLINICAL TEAM TO FOLLOW UP ON THIS REQUEST,  IF SYMPTOMS WORSENS TO SEEK OUT EMERGENT CARE. PATIENT  FULLY UNDERSTANDS.

## 2021-08-19 ENCOUNTER — CLINICAL SUPPORT (OUTPATIENT)
Dept: ORTHOPEDIC SURGERY | Facility: CLINIC | Age: 71
End: 2021-08-19

## 2021-08-19 DIAGNOSIS — M17.11 PRIMARY OSTEOARTHRITIS OF RIGHT KNEE: Primary | ICD-10-CM

## 2021-08-19 PROCEDURE — 20610 DRAIN/INJ JOINT/BURSA W/O US: CPT | Performed by: ORTHOPAEDIC SURGERY

## 2021-08-19 NOTE — PROGRESS NOTES
Procedure   Large Joint Arthrocentesis: R knee  Date/Time: 8/19/2021 3:28 PM  Consent given by: patient  Site marked: site marked  Timeout: Immediately prior to procedure a time out was called to verify the correct patient, procedure, equipment, support staff and site/side marked as required   Supporting Documentation  Indications: pain   Procedure Details  Location: knee - R knee  Preparation: Patient was prepped and draped in the usual sterile fashion  Needle size: 23 G  Approach: anterolateral  Medications administered: 30 mg Hyaluronan 30 MG/2ML  Patient tolerance: patient tolerated the procedure well with no immediate complications

## 2021-08-19 NOTE — PROGRESS NOTES
Patient returns for third Orthovisc injection the right knee.  Denies complications with prior to injections.  Overall her pain has improved.    Procedure Note:  I discussed with the patient the potential benefits of performing a therapeutic injection of the right knee as well as potential risks including but not limited to infection, swelling, pain, bleeding, bruising, nerve/vessel damage, pseudoseptic reaction, and worsening joint pain. After informed consent and verifying correct patient, procedure site, and type of procedure, the area was prepped with alcohol, ethyl chloride was used to numb the skin. Via the superolateral approach, the viscosupplementation syringe contents were injected into the right knee. The patient tolerated the procedure well. There were no complications. A sterile dressing was placed over the injection site.    Follow up as needed.

## 2021-08-23 ENCOUNTER — TELEPHONE (OUTPATIENT)
Dept: FAMILY MEDICINE CLINIC | Facility: CLINIC | Age: 71
End: 2021-08-23

## 2021-08-23 NOTE — TELEPHONE ENCOUNTER
Caller: Marci Snyder    Relationship: Self    Best call back number: [phone]    What orders are you requesting (i.e. lab or imaging): prescription for oxygen supplies    In what timeframe would the patient need to come in: Asap    Where will you receive your lab/imaging services:     Additional notes: patient is providing fax medical supplies [phone]

## 2021-08-24 LAB
FUNGUS WND CULT: NORMAL
MYCOBACTERIUM SPEC CULT: NORMAL
NIGHT BLUE STAIN TISS: NORMAL

## 2021-08-24 NOTE — TELEPHONE ENCOUNTER
I have no idea what she is talking about.  Unknown what oxygen supplies she is needing.  I have not seen her officially as a patient in 18 months.  I saw her recently when she accompanied her sister to a visit.

## 2021-08-24 NOTE — TELEPHONE ENCOUNTER
I just recently saw patient and to my knowledge she does not use oxygen can you please find out more about this.  I do not know what she is needing

## 2021-08-25 ENCOUNTER — OFFICE VISIT (OUTPATIENT)
Dept: ORTHOPEDIC SURGERY | Facility: CLINIC | Age: 71
End: 2021-08-25

## 2021-08-25 DIAGNOSIS — M86.9 OSTEOMYELITIS OF THIRD TOE OF RIGHT FOOT (HCC): Primary | ICD-10-CM

## 2021-08-25 PROCEDURE — 99024 POSTOP FOLLOW-UP VISIT: CPT | Performed by: ORTHOPAEDIC SURGERY

## 2021-08-25 NOTE — TELEPHONE ENCOUNTER
[facility], order is on my desk for signature

## 2021-08-25 NOTE — PROGRESS NOTES
Post-op Follow-up (4 week follow up - 6 weeks s/p  (R) FOOT AMPUTATE 3RD TOE AT PIP JOINT 07/13/2021)      Marci Snyder is 6 weeks status post amputate right third toe at PIP joint, 7/13/2021. She reports no fever, chills.  She is here because she is concerned that the toe turns purple and red when it is in the dependent position, especially at the end of a long day}.      Past Surgical History:   Procedure Laterality Date   • AMPUTATION DIGIT Right 7/13/2021    Procedure: AMPUTATE 3RD TOE AT PIP JOINT RIGHT;  Surgeon: Gwendolyn Del Rosario MD;  Location: [facility];  Service: Orthopedics;  Laterality: Right;   • COLONOSCOPY     • FOOT SURGERY Right     pinched nerve   • FOOT SURGERY Left 01/17/2019    1st MPJ fusion - Dr. Gwendolyn Del Rosario ; [facility]    • NOSE SURGERY  1985    DEVIATED SEPTUM       LMP  (LMP Unknown)         Good alignment, no erythema, no drainage, no sign of infection, normal post op swelling right foot third toe incision is fully healed, she has normal dependent rubor, it resolves with elevation    none    Assessment and Plan:   1. Osteomyelitis of third toe of right foot (CMS/HCC)  I reassured her that the color change is normal.  It is dependent rubor.  I showed her how to it resolves with elevation above her heart.  I explained that when she is worried about it she should put her foot way above her heart, if it gets pale again it is normal, if the redness remains then she should worry about infection and come in.  I will be happy to see her anytime at all          Gwendolyn Del Rosario MD

## 2021-08-30 ENCOUNTER — TRANSCRIBE ORDERS (OUTPATIENT)
Dept: ADMINISTRATIVE | Facility: HOSPITAL | Age: 71
End: 2021-08-30

## 2021-08-30 DIAGNOSIS — Z12.31 ENCOUNTER FOR SCREENING MAMMOGRAM FOR MALIGNANT NEOPLASM OF BREAST: Primary | ICD-10-CM

## 2021-09-15 ENCOUNTER — TELEPHONE (OUTPATIENT)
Dept: FAMILY MEDICINE CLINIC | Facility: CLINIC | Age: 71
End: 2021-09-15

## 2021-09-15 NOTE — TELEPHONE ENCOUNTER
Caller: Marci Snyder    Relationship: Self    Best call back number: [phone]    What orders are you requesting (i.e. lab or imaging): ORDERS FOR A PRESCRIPTION TO BE SENT TO [facility] FOR THE SUPPLIES FOR CPAP MACHINE SHE STATES THAT SHE NEEDS THE TUBING THE NOSE GEAR  HEAD GEAR AND FILTERS    In what timeframe would the patient need to come in: AS SOON AS POSSIBLE         Additional notes: PLEASE CALL THE PATIENT TO LET HER KNOW IF THAT HAS BEEN SENT OVER SHE STATES THAT THE FAX NUMBER FOR [facility]  [phone]

## 2021-09-19 PROBLEM — F32.A ANXIETY AND DEPRESSION: Status: ACTIVE | Noted: 2021-09-19

## 2021-09-19 PROBLEM — F41.9 ANXIETY AND DEPRESSION: Status: ACTIVE | Noted: 2021-09-19

## 2021-09-19 PROBLEM — M48.02 CERVICAL SPINAL STENOSIS: Status: ACTIVE | Noted: 2021-09-19

## 2021-09-19 PROBLEM — M50.30 DDD (DEGENERATIVE DISC DISEASE), CERVICAL: Status: ACTIVE | Noted: 2021-09-19

## 2021-09-19 PROBLEM — M47.812 CERVICAL SPONDYLOSIS WITHOUT MYELOPATHY: Status: ACTIVE | Noted: 2021-09-19

## 2021-10-08 ENCOUNTER — APPOINTMENT (OUTPATIENT)
Dept: MAMMOGRAPHY | Facility: HOSPITAL | Age: 71
End: 2021-10-08

## 2021-11-04 ENCOUNTER — OFFICE VISIT (OUTPATIENT)
Dept: FAMILY MEDICINE CLINIC | Facility: CLINIC | Age: 71
End: 2021-11-04

## 2021-11-04 VITALS
HEART RATE: 77 BPM | BODY MASS INDEX: 22.96 KG/M2 | WEIGHT: 155 LBS | SYSTOLIC BLOOD PRESSURE: 138 MMHG | HEIGHT: 69 IN | DIASTOLIC BLOOD PRESSURE: 62 MMHG | OXYGEN SATURATION: 99 % | TEMPERATURE: 97.2 F

## 2021-11-04 DIAGNOSIS — Z00.00 MEDICARE ANNUAL WELLNESS VISIT, SUBSEQUENT: Primary | ICD-10-CM

## 2021-11-04 DIAGNOSIS — M47.812 SPONDYLOSIS OF CERVICAL REGION WITHOUT MYELOPATHY OR RADICULOPATHY: ICD-10-CM

## 2021-11-04 DIAGNOSIS — M54.2 CHRONIC NECK PAIN WITH ABNORMAL NEUROLOGIC EXAMINATION: ICD-10-CM

## 2021-11-04 DIAGNOSIS — G89.29 CHRONIC NECK PAIN WITH ABNORMAL NEUROLOGIC EXAMINATION: ICD-10-CM

## 2021-11-04 DIAGNOSIS — E53.8 B12 DEFICIENCY: ICD-10-CM

## 2021-11-04 DIAGNOSIS — M15.9 PRIMARY OSTEOARTHRITIS INVOLVING MULTIPLE JOINTS: ICD-10-CM

## 2021-11-04 PROCEDURE — 1125F AMNT PAIN NOTED PAIN PRSNT: CPT | Performed by: PHYSICIAN ASSISTANT

## 2021-11-04 PROCEDURE — 96372 THER/PROPH/DIAG INJ SC/IM: CPT | Performed by: PHYSICIAN ASSISTANT

## 2021-11-04 PROCEDURE — 1170F FXNL STATUS ASSESSED: CPT | Performed by: PHYSICIAN ASSISTANT

## 2021-11-04 PROCEDURE — 1159F MED LIST DOCD IN RCRD: CPT | Performed by: PHYSICIAN ASSISTANT

## 2021-11-04 PROCEDURE — 96160 PT-FOCUSED HLTH RISK ASSMT: CPT | Performed by: PHYSICIAN ASSISTANT

## 2021-11-04 PROCEDURE — G0439 PPPS, SUBSEQ VISIT: HCPCS | Performed by: PHYSICIAN ASSISTANT

## 2021-11-04 RX ORDER — CYANOCOBALAMIN 1000 UG/ML
1000 INJECTION, SOLUTION INTRAMUSCULAR; SUBCUTANEOUS
Status: SHIPPED | OUTPATIENT
Start: 2021-11-04

## 2021-11-04 RX ADMIN — CYANOCOBALAMIN 1000 MCG: 1000 INJECTION, SOLUTION INTRAMUSCULAR; SUBCUTANEOUS at 14:53

## 2021-11-04 NOTE — PROGRESS NOTES
The ABCs of the Annual Wellness Visit  Subsequent Medicare Wellness Visit    Chief Complaint   Patient presents with   • Medicare Wellness-subsequent     Medicare wellness Exam     Subjective   History of Present Illness:  Marci Snyder is a 71 y.o. female who presents for an Subsequent Medicare Wellness Visit.    The following portions of the patient's history were reviewed and   updated as appropriate: allergies, current medications, past medical history, past social history, past surgical history and problem list.    Compared to one year ago, the patient feels her physical   health is better.    Compared to one year ago, the patient feels her mental   health is better.    Recent Hospitalizations:  She was not admitted to the hospital during the last year.       Current Medical Providers:  Patient Care Team:  Jimbo Vuong MD as PCP - General (Family Medicine)    Outpatient Medications Prior to Visit   Medication Sig Dispense Refill   • Allergy 4 MG tablet Take 4 mg by mouth Daily. WAL-FINATE     • Apoaequorin (Prevagen Extra Strength) 20 MG capsule Take 1 tablet by mouth Daily. 30 capsule 11   • azelastine (OPTIVAR) 0.05 % ophthalmic solution azelastine 0.05 % eye drops     • buPROPion XL (WELLBUTRIN XL) 150 MG 24 hr tablet Take 1 tablet by mouth 2 (Two) Times a Day. 180 tablet 3   • cetirizine (ZyrTEC) 10 MG tablet Take 10 mg by mouth daily.     • Cholecalciferol (Vitamin D3) 1.25 MG (13016 UT) tablet Take 1 tablet by mouth Daily. STOPPED TAKING FOR UPCOMING PROCEDURE     • DHEA 50 MG capsule Take 1 capsule by mouth Daily.     • Diclofenac Sodium (VOLTAREN) 1 % gel gel Apply 2 g topically to the appropriate area as directed 2 (Two) Times a Day. For neck pain 100 g 11   • fluocinonide-emollient (LIDEX-E) 0.05 % cream Apply  topically to the appropriate area as directed 2 (Two) Times a Day. 30 g 1   • FLUZONE HIGH-DOSE 0.5 ML suspension prefilled syringe injection inject 0.5 milliliter  intramuscularly  0   • levocetirizine (XYZAL) 5 MG tablet Take 5 mg by mouth Daily.  0   • levothyroxine (SYNTHROID, LEVOTHROID) 25 MCG tablet Take 1 tablet by mouth Daily. 90 tablet 3   • loratadine (CLARITIN) 10 MG tablet Take 1 tablet by mouth Daily. 90 tablet 3   • Melatonin 10 MG capsule Take 5 capsules by mouth Every Night.     • methscopolamine (PAMINE) 2.5 MG tablet Take 1 tablet by mouth 2 (Two) Times a Day As Needed (congestion). (Patient taking differently: Take 2.5 mg by mouth 4 (Four) Times a Day.) 120 tablet 5   • montelukast (SINGULAIR) 10 MG tablet Take 1 tablet by mouth Every Night. (Patient taking differently: Take 10 mg by mouth Daily.) 90 tablet 3   • naproxen (NAPROSYN) 500 MG tablet Take 1 tablet by mouth 2 (Two) Times a Day With Meals. (Patient taking differently: Take 500 mg by mouth 2 (Two) Times a Day With Meals. STOPPED TAKING X1 WEEK FOR UPCOMING PROCEDURE.) 180 tablet 3   • sertraline (ZOLOFT) 25 MG tablet Take 1 tablet by mouth Daily. 90 tablet 3   • traMADol (ULTRAM) 50 MG tablet Take 2 tablets by mouth Every 6 (Six) Hours As Needed for Severe Pain . (Patient taking differently: Take 100 mg by mouth 4 (Four) Times a Day. For chronic Arthritis) 240 tablet 5     Facility-Administered Medications Prior to Visit   Medication Dose Route Frequency Provider Last Rate Last Admin   • cyanocobalamin injection 1,000 mcg  1,000 mcg Intramuscular Q28 Days Saranya Terrell PA-C   1,000 mcg at 10/08/19 1648   • cyanocobalamin injection 1,000 mcg  1,000 mcg Intramuscular Q28 Days Saranya Terrell PA-C   1,000 mcg at 01/31/20 1454       Opioid medication/s are on active medication list.  and I have evaluated her active treatment plan and pain score trends (see table).  Vitals:    11/04/21 1419   PainSc:   6     I have reviewed the chart for potential of high risk medication and harmful drug interactions in the elderly.            Aspirin is not on active medication list.  Aspirin use is not  "indicated based on review of current medical condition/s. Risk of harm outweighs potential benefits.  .    Patient Active Problem List   Diagnosis   • Osteoporosis   • Vitamin D deficiency   • Sciatica of right side   • Hypothyroidism   • Depressive disorder   • Osteoarthritis of shoulder   • Allergic rhinitis   • Benign paroxysmal positional vertigo   • Pain in both feet   • Acquired metatarsus varus of right foot   • Contracture of joint of right foot   • Osteomyelitis of right foot (HCC)   • Osteomyelitis of third toe of right foot (HCC)   • Cervical spinal stenosis   • DDD (degenerative disc disease), cervical   • Cervical spondylosis without myelopathy   • Anxiety and depression     Advance Care Planning   has NO advanced directive - not interested in additional information    Review of Systems   Constitutional: Positive for activity change ( Able to be more active lately now the foot is healed).   Respiratory: Negative.    Cardiovascular: Negative.    Musculoskeletal: Positive for arthralgias and myalgias. Negative for gait problem and joint swelling.        Chronic arthritic pain stable on tramadol 4 times daily   Skin: Negative.    Neurological: Negative for weakness and numbness.         Objective       Vitals:    11/04/21 1419   BP: 138/62   Pulse: 77   Temp: 97.2 °F (36.2 °C)   SpO2: 99%   Weight: 70.3 kg (155 lb)   Height: 174 cm (68.5\")   PainSc:   6     BMI Readings from Last 1 Encounters:   11/04/21 23.22 kg/m²   BMI is within normal parameters. No follow-up required.    Does the patient have evidence of cognitive impairment? No    Physical Exam  Vitals and nursing note reviewed.   Constitutional:       General: She is not in acute distress.     Appearance: Normal appearance. She is well-developed and normal weight. She is not ill-appearing, toxic-appearing or diaphoretic.   HENT:      Head: Normocephalic and atraumatic.   Neck:      Vascular: No JVD.   Cardiovascular:      Rate and Rhythm: Normal " rate and regular rhythm.      Heart sounds: Normal heart sounds. No murmur heard.      Pulmonary:      Effort: Pulmonary effort is normal. No respiratory distress.      Breath sounds: Normal breath sounds.   Chest:      Chest wall: No tenderness.   Abdominal:      General: There is no distension.      Palpations: Abdomen is soft.      Tenderness: There is no abdominal tenderness.   Musculoskeletal:         General: No swelling. Normal range of motion.   Skin:     General: Skin is warm and dry.      Coloration: Skin is not pale.      Findings: No erythema.   Neurological:      Mental Status: She is alert and oriented to person, place, and time.   Psychiatric:         Mood and Affect: Mood normal.         Behavior: Behavior normal.         Thought Content: Thought content normal.         Judgment: Judgment normal.               HEALTH RISK ASSESSMENT    Smoking Status:  Social History     Tobacco Use   Smoking Status Never Smoker   Smokeless Tobacco Never Used     Alcohol Consumption:  Social History     Substance and Sexual Activity   Alcohol Use Yes    Comment: social     Fall Risk Screen:    [facility], and patient is at MODERATE risk for falls. Assessment completed on:11/4/2021    Depression Screen:   PHQ-2/PHQ-9 Depression Screening 11/4/2021   Little interest or pleasure in doing things 0   Feeling down, depressed, or hopeless 0   Total Score 0       Health Habits and Functional and Cognitive Screening:  Functional & Cognitive Status 11/4/2021   Do you have difficulty preparing food and eating? No   Do you have difficulty bathing yourself, getting dressed or grooming yourself? No   Do you have difficulty using the toilet? -   Do you have difficulty moving around from place to place? No   Do you have trouble with steps or getting out of a bed or a chair? No   Current Diet Well Balanced Diet   Dental Exam Up to date   Eye Exam Up to date   Exercise (times per week) 3 times per week    Current Exercises Include Walking   Current Exercise Activities Include -   Do you need help using the phone?  No   Are you deaf or do you have serious difficulty hearing?  No   Do you need help with transportation? No   Do you need help shopping? No   Do you need help preparing meals?  No   Do you need help with housework?  No   Do you need help with laundry? No   Do you need help taking your medications? No   Do you need help managing money? No   Do you ever drive or ride in a car without wearing a seat belt? No   Have you felt unusual stress, anger or loneliness in the last month? No   Who do you live with? Spouse   If you need help, do you have trouble finding someone available to you? No   Have you been bothered in the last four weeks by sexual problems? No   Do you have difficulty concentrating, remembering or making decisions? No       Age-appropriate Screening Schedule:  Refer to the list below for future screening recommendations based on patient's age, sex and/or medical conditions. Orders for these recommended tests are listed in the plan section. The patient has been provided with a written plan.    Health Maintenance   Topic Date Due   • TDAP/TD VACCINES (1 - Tdap) 11/02/2022 (Originally 10/22/1969)   • MAMMOGRAM  11/07/2021   • HEMOGLOBIN A1C  01/08/2022   • DXA SCAN  01/31/2022   • DIABETIC EYE EXAM  02/03/2022   • INFLUENZA VACCINE  Completed   • DIABETIC FOOT EXAM  Discontinued   • URINE MICROALBUMIN  Discontinued   • ZOSTER VACCINE  Discontinued            Assessment/Plan     CMS Preventative Services Quick Reference  Risk Factors Identified During Encounter  Chronic Pain   The above risks/problems have been discussed with the patient.  Follow up actions/plans if indicated are seen below in the Assessment/Plan Section.  Pertinent information has been shared with the patient in the After Visit Summary.    Diagnoses and all orders for this visit:    1. Medicare annual wellness visit, subsequent  (Primary)    2. B12 deficiency  -     cyanocobalamin injection 1,000 mcg    3. Chronic neck pain with abnormal neurologic examination    4. Spondylosis of cervical region without myelopathy or radiculopathy    5. Primary osteoarthritis involving multiple joints        Follow Up:  Return in about 6 months (around 5/4/2022) for Recheck.     An After Visit Summary and PPPS were given to the patient.         Refill given for tramadol 50 Milgram tablets take 2 tablets 4 times daily for chronic arthritic pain #240 with 5 refills.  Follow-up in 6 months for recheck.    As part of this patient's treatment plan, patient will be prescribed controlled substances. The patient has been made aware of appropriate use of such medications, including potential risk of somnolence, limited ability to drive and /or work safely, and potential for dependence or overdose. It has also been made clear that these medications are for use by this patient only, without concomitant use of alcohol or other substances unless prescribed.Controlled substance status of medication discussed with patient, discussed risks of medication including abuse potential and diversion potential and need to follow up for reevaluation appointment in order to receive further refills.    Part of this note may be an electronic transcription/translation of spoken language to printed text using the Dragon Dictation System.      Patient has been erroneously marked as diabetic. Based on the available clinical information, she does not have diabetes and should therefore be excluded from diabetic health maintenance and quality measures for the remainder of the reporting period.

## 2021-11-05 ENCOUNTER — APPOINTMENT (OUTPATIENT)
Dept: MAMMOGRAPHY | Facility: HOSPITAL | Age: 71
End: 2021-11-05

## 2021-12-17 ENCOUNTER — APPOINTMENT (OUTPATIENT)
Dept: MAMMOGRAPHY | Facility: HOSPITAL | Age: 71
End: 2021-12-17

## 2021-12-29 ENCOUNTER — OFFICE VISIT (OUTPATIENT)
Dept: ORTHOPEDIC SURGERY | Facility: CLINIC | Age: 71
End: 2021-12-29

## 2021-12-29 VITALS
DIASTOLIC BLOOD PRESSURE: 82 MMHG | HEIGHT: 69 IN | WEIGHT: 150.8 LBS | BODY MASS INDEX: 22.33 KG/M2 | SYSTOLIC BLOOD PRESSURE: 130 MMHG

## 2021-12-29 DIAGNOSIS — M79.671 RIGHT FOOT PAIN: Primary | ICD-10-CM

## 2021-12-29 DIAGNOSIS — M79.671 FOOT PAIN, RIGHT: ICD-10-CM

## 2021-12-29 PROCEDURE — 20600 DRAIN/INJ JOINT/BURSA W/O US: CPT | Performed by: ORTHOPAEDIC SURGERY

## 2021-12-29 PROCEDURE — 99213 OFFICE O/P EST LOW 20 MIN: CPT | Performed by: ORTHOPAEDIC SURGERY

## 2021-12-29 RX ORDER — TRIAMCINOLONE ACETONIDE 40 MG/ML
40 INJECTION, SUSPENSION INTRA-ARTICULAR; INTRAMUSCULAR
Status: COMPLETED | OUTPATIENT
Start: 2021-12-29 | End: 2021-12-29

## 2021-12-29 RX ORDER — LIDOCAINE HYDROCHLORIDE 10 MG/ML
0.5 INJECTION, SOLUTION INFILTRATION; PERINEURAL
Status: COMPLETED | OUTPATIENT
Start: 2021-12-29 | End: 2021-12-29

## 2021-12-29 RX ADMIN — LIDOCAINE HYDROCHLORIDE 0.5 ML: 10 INJECTION, SOLUTION INFILTRATION; PERINEURAL at 15:17

## 2021-12-29 RX ADMIN — TRIAMCINOLONE ACETONIDE 40 MG: 40 INJECTION, SUSPENSION INTRA-ARTICULAR; INTRAMUSCULAR at 15:17

## 2021-12-29 NOTE — PROGRESS NOTES
Procedure   Small Joint Arthrocentesis  Consent given by: patient  Site marked: site marked  Timeout: Immediately prior to procedure a time out was called to verify the correct patient, procedure, equipment, support staff and site/side marked as required   Supporting Documentation  Indications: pain   Procedure Details  Location: great toe - Great toe joint: Right MTPJ.  Preparation: Patient was prepped and draped in the usual sterile fashion  Needle size: 22 G  Approach: medial  Medications administered: 40 mg triamcinolone acetonide 40 MG/ML; 0.5 mL lidocaine 1 %  Patient tolerance: patient tolerated the procedure well with no immediate complications

## 2021-12-29 NOTE — PROGRESS NOTES
ESTABLISHED PATIENT    Patient: Marci Snyder  : 1950    Primary Care Provider: Jimbo Vuong MD    Requesting Provider: As above    Pain of the Right Foot      History    Chief Complaint: Right great toe pain    History of Present Illness: She returns unexpectedly with increased pain in the right great toe.  The left great toe where we did the fusion is doing well.  She is healed with respect to the partial amputation of the right third toe.  She reports that even with any type of shoes she is having a lot of aching pain in the right great toe.    Current Outpatient Medications on File Prior to Visit   Medication Sig Dispense Refill   • Allergy 4 MG tablet Take 4 mg by mouth Daily. WAL-FINATE     • Apoaequorin (Prevagen Extra Strength) 20 MG capsule Take 1 tablet by mouth Daily. 30 capsule 11   • azelastine (OPTIVAR) 0.05 % ophthalmic solution azelastine 0.05 % eye drops     • buPROPion XL (WELLBUTRIN XL) 150 MG 24 hr tablet Take 1 tablet by mouth 2 (Two) Times a Day. 180 tablet 3   • cetirizine (ZyrTEC) 10 MG tablet Take 10 mg by mouth daily.     • Cholecalciferol (Vitamin D3) 1.25 MG (81791 UT) tablet Take 1 tablet by mouth Daily. STOPPED TAKING FOR UPCOMING PROCEDURE     • DHEA 50 MG capsule Take 1 capsule by mouth Daily.     • Diclofenac Sodium (VOLTAREN) 1 % gel gel Apply 2 g topically to the appropriate area as directed 2 (Two) Times a Day. For neck pain 100 g 11   • fluocinonide-emollient (LIDEX-E) 0.05 % cream Apply  topically to the appropriate area as directed 2 (Two) Times a Day. 30 g 1   • FLUZONE HIGH-DOSE 0.5 ML suspension prefilled syringe injection inject 0.5 milliliter intramuscularly  0   • levocetirizine (XYZAL) 5 MG tablet Take 5 mg by mouth Daily.  0   • levothyroxine (SYNTHROID, LEVOTHROID) 25 MCG tablet Take 1 tablet by mouth Daily. 90 tablet 3   • loratadine (CLARITIN) 10 MG tablet Take 1 tablet by mouth Daily. 90 tablet 3   • Melatonin 10 MG capsule Take 5  capsules by mouth Every Night.     • methscopolamine (PAMINE) 2.5 MG tablet Take 1 tablet by mouth 2 (Two) Times a Day As Needed (congestion). (Patient taking differently: Take 2.5 mg by mouth 4 (Four) Times a Day.) 120 tablet 5   • montelukast (SINGULAIR) 10 MG tablet Take 1 tablet by mouth Every Night. (Patient taking differently: Take 10 mg by mouth Daily.) 90 tablet 3   • naproxen (NAPROSYN) 500 MG tablet Take 1 tablet by mouth 2 (Two) Times a Day With Meals. (Patient taking differently: Take 500 mg by mouth 2 (Two) Times a Day With Meals. STOPPED TAKING X1 WEEK FOR UPCOMING PROCEDURE.) 180 tablet 3   • sertraline (ZOLOFT) 25 MG tablet Take 1 tablet by mouth Daily. 90 tablet 3   • traMADol (ULTRAM) 50 MG tablet Take 2 tablets by mouth Every 6 (Six) Hours As Needed for Severe Pain . (Patient taking differently: Take 100 mg by mouth 4 (Four) Times a Day. For chronic Arthritis) 240 tablet 5     Current Facility-Administered Medications on File Prior to Visit   Medication Dose Route Frequency Provider Last Rate Last Admin   • cyanocobalamin injection 1,000 mcg  1,000 mcg Intramuscular Q28 Days Saranya Terrell PA-C   1,000 mcg at 10/08/19 1648   • cyanocobalamin injection 1,000 mcg  1,000 mcg Intramuscular Q28 Days Saranya Terrell PA-C   1,000 mcg at 01/31/20 1454   • cyanocobalamin injection 1,000 mcg  1,000 mcg Intramuscular Q28 Days Saranya Terrell PA-C   1,000 mcg at 11/04/21 1453      Allergies   Allergen Reactions   • Penicillins Anaphylaxis     Prev. Tolerated Keflex      Past Medical History:   Diagnosis Date   • Allergic rhinitis    • Benign paroxysmal positional vertigo    • Bursitis/tendonitis, shoulder    • Chronic nonsuppurative otitis media    • Depressive disorder    • Fatigue    • Headache, cervicogenic    • Hypothyroidism    • Insomnia    • Myalgia    • Nail fungus    • Nervousness    • OA (osteoarthritis) of shoulder    • CHIQUITA (obstructive sleep apnea)     COMPLIANT WITH CPAP USE   •  Osteoporosis    • Preventive measure    • Sciatica of right side    • TSH (thyroid-stimulating hormone deficiency)    • Vitamin D deficiency    • Wears glasses      Past Surgical History:   Procedure Laterality Date   • AMPUTATION DIGIT Right 7/13/2021    Procedure: AMPUTATE 3RD TOE AT PIP JOINT RIGHT;  Surgeon: Gwendolyn Del Rosario MD;  Location: [facility];  Service: Orthopedics;  Laterality: Right;   • COLONOSCOPY     • FOOT SURGERY Right     pinched nerve   • FOOT SURGERY Left 01/17/2019    1st MPJ fusion - Dr. Gwendolyn Del Rosario ; Replaced by [facility]    • NOSE SURGERY  1985    DEVIATED SEPTUM     Family History   Problem Relation Age of Onset   • Stroke Mother    • Cancer Father    • Breast cancer Sister 73   • Breast cancer Paternal Aunt         DX AGE UNKNOWN   • Breast cancer Cousin         SEVERAL COUSINS - DX AGES UNKNOWN   • Breast cancer Paternal Aunt         DX AGE UNKNOWN   • Ovarian cancer Neg Hx       Social History     Socioeconomic History   • Marital status:    Tobacco Use   • Smoking status: Never Smoker   • Smokeless tobacco: Never Used   Vaping Use   • Vaping Use: Never used   Substance and Sexual Activity   • Alcohol use: Yes     Comment: social   • Drug use: No   • Sexual activity: Yes        Review of Systems   Constitutional: Negative.    HENT: Negative.    Eyes: Negative.    Respiratory: Negative.    Cardiovascular: Negative.    Gastrointestinal: Negative.    Endocrine: Negative.    Genitourinary: Negative.    Musculoskeletal: Positive for arthralgias, back pain and neck pain.   Skin: Negative.    Allergic/Immunologic: Positive for environmental allergies.   Neurological: Negative.    Hematological: Negative.    Psychiatric/Behavioral: Negative.        The following portions of the patient's history were reviewed and updated as appropriate: allergies, current medications, past family history, past medical history, past social history, past surgical history and problem  "list.    Physical Exam:   /82   Ht 174 cm (68.5\")   Wt 68.4 kg (150 lb 12.8 oz)   LMP  (LMP Unknown)   BMI 22.59 kg/m²   GENERAL: Body habitus: normal weight for height    Lower extremity edema: Left: none; Right: none    Gait: antalgic with the first few steps     Mental Status:  awake and alert; oriented to person, place, and time  MSK:  [place]:  Right:  non tender; Left:  non tender        Ankle:  Right: non tender; Left:  non tender        Foot:  Right:  Very tender over the first MTPJ, pain with a grind test, third toe incision is healed; Left:  Stable first MTPJ healed incision nontender    NEURO Sensation:  intact    Medical Decision Making    Data Review:   ordered and reviewed x-rays today    Assessment/Plan/Diagnosis/Treatment Options:   1. Right foot pain  Has had progression of arthritis in the right first MTPJ.  We talked about the options.  She thinks she might like to have surgery next year.  She would like something to help the pain  at present.  We talked about cortisone injection.  She would like to try that.  I will see her again in 6 to 9 months standing 2 views of the right foot    After discussing the risks (including infection, skin atrophy, etc), time out was called,  the right 1st MTPJ was prepped and using sterile technique injected with 0.5cc of 1% lidocaine and 1cc (40mg) triamcinolone.  A band aid was applied.  No complications.       - X    Gwendolyn Huff MD                      "

## 2022-01-08 DIAGNOSIS — M19.012 PRIMARY OSTEOARTHRITIS OF LEFT SHOULDER: ICD-10-CM

## 2022-01-10 RX ORDER — NAPROXEN 500 MG/1
TABLET ORAL
Qty: 180 TABLET | Refills: 3 | Status: SHIPPED | OUTPATIENT
Start: 2022-01-10 | End: 2023-01-03

## 2022-01-26 ENCOUNTER — OFFICE VISIT (OUTPATIENT)
Dept: ORTHOPEDIC SURGERY | Facility: CLINIC | Age: 72
End: 2022-01-26

## 2022-01-26 VITALS
HEIGHT: 69 IN | DIASTOLIC BLOOD PRESSURE: 72 MMHG | WEIGHT: 150.79 LBS | SYSTOLIC BLOOD PRESSURE: 124 MMHG | BODY MASS INDEX: 22.33 KG/M2

## 2022-01-26 DIAGNOSIS — M79.671 RIGHT FOOT PAIN: Primary | ICD-10-CM

## 2022-01-26 PROCEDURE — 99212 OFFICE O/P EST SF 10 MIN: CPT | Performed by: ORTHOPAEDIC SURGERY

## 2022-01-26 NOTE — PROGRESS NOTES
ESTABLISHED PATIENT    Patient: Marci Snyder  : 1950    Primary Care Provider: Jimbo Vuong MD    Requesting Provider: As above    Pain of the Right Foot (Right foot pain-last cortisone injection to the 1st MTPJ given on 21)      History    Chief Complaint: Right great toe pain    History of Present Illness: She returns reporting she had about a week or 2 of improvement after the injection but the toe was hurting again.  She wants to make sure there is no infection in it similar to what she had in the amputated third toe.  She has not had any fever no chills.  No open wounds.  No drainage    Current Outpatient Medications on File Prior to Visit   Medication Sig Dispense Refill   • Allergy 4 MG tablet Take 4 mg by mouth Daily. WAL-FINATE     • Apoaequorin (Prevagen Extra Strength) 20 MG capsule Take 1 tablet by mouth Daily. 30 capsule 11   • azelastine (OPTIVAR) 0.05 % ophthalmic solution azelastine 0.05 % eye drops     • buPROPion XL (WELLBUTRIN XL) 150 MG 24 hr tablet Take 1 tablet by mouth 2 (Two) Times a Day. 180 tablet 3   • cetirizine (ZyrTEC) 10 MG tablet Take 10 mg by mouth daily.     • Cholecalciferol (Vitamin D3) 1.25 MG (66550 UT) tablet Take 1 tablet by mouth Daily. STOPPED TAKING FOR UPCOMING PROCEDURE     • DHEA 50 MG capsule Take 1 capsule by mouth Daily.     • Diclofenac Sodium (VOLTAREN) 1 % gel gel Apply 2 g topically to the appropriate area as directed 2 (Two) Times a Day. For neck pain 100 g 11   • fluocinonide-emollient (LIDEX-E) 0.05 % cream Apply  topically to the appropriate area as directed 2 (Two) Times a Day. 30 g 1   • FLUZONE HIGH-DOSE 0.5 ML suspension prefilled syringe injection inject 0.5 milliliter intramuscularly  0   • levocetirizine (XYZAL) 5 MG tablet Take 5 mg by mouth Daily.  0   • levothyroxine (SYNTHROID, LEVOTHROID) 25 MCG tablet Take 1 tablet by mouth Daily. 90 tablet 3   • loratadine (CLARITIN) 10 MG tablet Take 1 tablet by mouth Daily.  90 tablet 3   • Melatonin 10 MG capsule Take 5 capsules by mouth Every Night.     • methscopolamine (PAMINE) 2.5 MG tablet Take 1 tablet by mouth 2 (Two) Times a Day As Needed (congestion). (Patient taking differently: Take 2.5 mg by mouth 4 (Four) Times a Day.) 120 tablet 5   • montelukast (SINGULAIR) 10 MG tablet Take 1 tablet by mouth Every Night. (Patient taking differently: Take 10 mg by mouth Daily.) 90 tablet 3   • naproxen (NAPROSYN) 500 MG tablet TAKE 1 TABLET BY MOUTH TWICE DAILY WITH MEALS 180 tablet 3   • sertraline (ZOLOFT) 25 MG tablet Take 1 tablet by mouth Daily. 90 tablet 3   • traMADol (ULTRAM) 50 MG tablet Take 2 tablets by mouth Every 6 (Six) Hours As Needed for Severe Pain . (Patient taking differently: Take 100 mg by mouth 4 (Four) Times a Day. For chronic Arthritis) 240 tablet 5     Current Facility-Administered Medications on File Prior to Visit   Medication Dose Route Frequency Provider Last Rate Last Admin   • cyanocobalamin injection 1,000 mcg  1,000 mcg Intramuscular Q28 Days Saranya Terrell PA-C   1,000 mcg at 10/08/19 1648   • cyanocobalamin injection 1,000 mcg  1,000 mcg Intramuscular Q28 Days Saranya Terrell PA-C   1,000 mcg at 01/31/20 1454   • cyanocobalamin injection 1,000 mcg  1,000 mcg Intramuscular Q28 Days Saranya Terrell PA-C   1,000 mcg at 11/04/21 1453      Allergies   Allergen Reactions   • Penicillins Anaphylaxis     Prev. Tolerated Keflex      Past Medical History:   Diagnosis Date   • Allergic rhinitis    • Benign paroxysmal positional vertigo    • Bursitis/tendonitis, shoulder    • Chronic nonsuppurative otitis media    • Depressive disorder    • Fatigue    • Headache, cervicogenic    • Hypothyroidism    • Insomnia    • Myalgia    • Nail fungus    • Nervousness    • OA (osteoarthritis) of shoulder    • CHIQUITA (obstructive sleep apnea)     COMPLIANT WITH CPAP USE   • Osteoporosis    • Preventive measure    • Sciatica of right side    • TSH (thyroid-stimulating  "hormone deficiency)    • Vitamin D deficiency    • Wears glasses      Past Surgical History:   Procedure Laterality Date   • AMPUTATION DIGIT Right 7/13/2021    Procedure: AMPUTATE 3RD TOE AT PIP JOINT RIGHT;  Surgeon: Gwendolyn Del Rosario MD;  Location: [facility];  Service: Orthopedics;  Laterality: Right;   • COLONOSCOPY     • FOOT SURGERY Right     pinched nerve   • FOOT SURGERY Left 01/17/2019    1st MPJ fusion - Dr. Gwendolyn Del Rosario ; [facility]    • NOSE SURGERY  1985    DEVIATED SEPTUM     Family History   Problem Relation Age of Onset   • Stroke Mother    • Cancer Father    • Breast cancer Sister 73   • Breast cancer Paternal Aunt         DX AGE UNKNOWN   • Breast cancer Cousin         SEVERAL COUSINS - DX AGES UNKNOWN   • Breast cancer Paternal Aunt         DX AGE UNKNOWN   • Ovarian cancer Neg Hx       Social History     Socioeconomic History   • Marital status:    Tobacco Use   • Smoking status: Never Smoker   • Smokeless tobacco: Never Used   Vaping Use   • Vaping Use: Never used   Substance and Sexual Activity   • Alcohol use: Yes     Comment: social   • Drug use: No   • Sexual activity: Yes        Review of Systems   Constitutional: Negative.    HENT: Negative.    Eyes: Negative.    Respiratory: Negative.    Cardiovascular: Negative.    Gastrointestinal: Negative.    Endocrine: Negative.    Genitourinary: Negative.    Musculoskeletal: Positive for arthralgias.   Skin: Negative.    Allergic/Immunologic: Negative.    Neurological: Negative.    Hematological: Negative.    Psychiatric/Behavioral: Negative.        The following portions of the patient's history were reviewed and updated as appropriate: allergies, current medications, past family history, past medical history, past social history, past surgical history and problem list.    Physical Exam:   /72   Ht 174 cm (68.5\")   Wt 68.4 kg (150 lb 12.7 oz)   LMP  (LMP Unknown)   BMI 22.59 kg/m²   Right great toe has severe " hallux valgus and is tender over the dorsal osteophytes, but there is no open wounds, there is nothing that suggest infection.  Third toe amputation site is fully healed.  Medical Decision Making    Data Review:   none    Assessment/Plan/Diagnosis/Treatment Options:   1. Right foot pain  I reassured her that I do not see any signs of infection.  The pain is due to the arthritis in the joint.  She will return in August she wants to consider fusion of the joint in the fall        Gwendolyn Huff MD

## 2022-01-27 ENCOUNTER — APPOINTMENT (OUTPATIENT)
Dept: MAMMOGRAPHY | Facility: HOSPITAL | Age: 72
End: 2022-01-27

## 2022-02-14 DIAGNOSIS — F32.A DEPRESSIVE DISORDER: ICD-10-CM

## 2022-02-14 RX ORDER — BUPROPION HYDROCHLORIDE 150 MG/1
150 TABLET ORAL 2 TIMES DAILY
Qty: 180 TABLET | Refills: 0 | Status: SHIPPED | OUTPATIENT
Start: 2022-02-14 | End: 2022-03-17 | Stop reason: SDUPTHER

## 2022-02-14 NOTE — TELEPHONE ENCOUNTER
Caller: ClaudioMarci    Relationship: Self    Best call back number: [phone]    Requested Prescriptions:   Requested Prescriptions     Pending Prescriptions Disp Refills   • buPROPion XL (WELLBUTRIN XL) 150 MG 24 hr tablet 180 tablet 3     Sig: Take 1 tablet by mouth 2 (Two) Times a Day.        Pharmacy where request should be sent: [id number] [address] AT [facility] [phone] [facility] [phone] FX     Additional details provided by patient: PATIENT STATES THAT SHE IS OUT OF THIS MEDICATION.    Does the patient have less than a 3 day supply:  [x] Yes  [] No    Palma Velarde Rep   02/14/22 09:05 EST

## 2022-02-18 ENCOUNTER — TELEPHONE (OUTPATIENT)
Dept: PAIN MEDICINE | Facility: CLINIC | Age: 72
End: 2022-02-18

## 2022-02-18 NOTE — TELEPHONE ENCOUNTER
PATIENT CANCELLED THE FOLLOWING NEW PATIENT APPTS. WITH ME:  1. 9/23/2021  2. 11/11/2021  3. 1/27/2022  4. 2/3/2022  5. 2/24/2022    SHE WILL NOT BE RESCHEDULED. PLEASE LET REFERRING OFFICE KNOW. THANKS. Dr GARCIA

## 2022-02-26 ENCOUNTER — APPOINTMENT (OUTPATIENT)
Dept: MAMMOGRAPHY | Facility: HOSPITAL | Age: 72
End: 2022-02-26

## 2022-03-02 ENCOUNTER — APPOINTMENT (OUTPATIENT)
Dept: MAMMOGRAPHY | Facility: HOSPITAL | Age: 72
End: 2022-03-02

## 2022-03-04 ENCOUNTER — OFFICE VISIT (OUTPATIENT)
Dept: ORTHOPEDIC SURGERY | Facility: CLINIC | Age: 72
End: 2022-03-04

## 2022-03-04 VITALS
BODY MASS INDEX: 22.13 KG/M2 | SYSTOLIC BLOOD PRESSURE: 124 MMHG | HEIGHT: 69 IN | DIASTOLIC BLOOD PRESSURE: 74 MMHG | WEIGHT: 149.4 LBS

## 2022-03-04 DIAGNOSIS — M79.671 RIGHT FOOT PAIN: Primary | ICD-10-CM

## 2022-03-04 PROCEDURE — 99212 OFFICE O/P EST SF 10 MIN: CPT | Performed by: PHYSICIAN ASSISTANT

## 2022-03-04 RX ORDER — SODIUM FLUORIDE 6 MG/ML
PASTE, DENTIFRICE DENTAL SEE ADMIN INSTRUCTIONS
COMMUNITY
Start: 2022-02-23

## 2022-03-04 RX ORDER — PILOCARPINE HYDROCHLORIDE 5 MG/1
TABLET, FILM COATED ORAL
COMMUNITY
Start: 2022-02-24

## 2022-03-04 NOTE — PROGRESS NOTES
"        [facility] Note        Subjective     CC: Follow-up (Right Great toe pain, last cortisone injection to the 1st MTPJ given on 12/29/21))      HPI    Marci Snyder is a 71 y.o. female. Patient returns with concerns about her right bunion.  She reports redness with some skin abrasion from shoe wear.  She has placed herself back into a sandal that does not cause pressure over the bunion.  No drainage.  No warmth.       ROS:    Constiutional:Pt denies fever, chills, nausea, or vomiting.  MSK:as above        Objective      Past Medical History  Past Medical History:   Diagnosis Date   • Allergic rhinitis    • Benign paroxysmal positional vertigo    • Bursitis/tendonitis, shoulder    • Chronic nonsuppurative otitis media    • Depressive disorder    • Fatigue    • Headache, cervicogenic    • Hypothyroidism    • Insomnia    • Myalgia    • Nail fungus    • Nervousness    • OA (osteoarthritis) of shoulder    • CHIQUITA (obstructive sleep apnea)     COMPLIANT WITH CPAP USE   • Osteoporosis    • Preventive measure    • Sciatica of right side    • TSH (thyroid-stimulating hormone deficiency)    • Vitamin D deficiency    • Wears glasses          Physical Exam  /74   Ht 174 cm (68.5\")   Wt 67.8 kg (149 lb 6.4 oz)   LMP  (LMP Unknown)   BMI 22.38 kg/m²     Body mass index is 22.38 kg/m².    Patient is well nourished and well developed.        Ortho Exam  Right foot exam: Right bunion is midlly erythematous which resolves with elevation.  2 tiny abrasions on the bunion with no sign of infection.  Tender as expected.      Imaging/Labs/EMG Reviewed:  Imaging Results (Last 24 Hours)     ** No results found for the last 24 hours. **            Assessment    Assessment:  1. Right foot pain        Plan:  1. Recommend over the counter anti-inflammatories for pain and/or swelling  2. Right foot pain.  I reassured the patient that the bunion  Is not affected.  The redness resolves with elevation and it " is secondary to shoe pressure.  She should continue with her open sandal or get a very wide shoe to avoid pressure.  She will return to see DR. Huff as scheduled in 6/22 or sooner if needed.      Stephanie Schaefer PA-C  03/09/22  12:00 EST

## 2022-03-17 ENCOUNTER — OFFICE VISIT (OUTPATIENT)
Dept: FAMILY MEDICINE CLINIC | Facility: CLINIC | Age: 72
End: 2022-03-17

## 2022-03-17 VITALS
HEART RATE: 77 BPM | WEIGHT: 155 LBS | BODY MASS INDEX: 22.96 KG/M2 | SYSTOLIC BLOOD PRESSURE: 144 MMHG | HEIGHT: 69 IN | OXYGEN SATURATION: 97 % | DIASTOLIC BLOOD PRESSURE: 72 MMHG | TEMPERATURE: 97.2 F

## 2022-03-17 DIAGNOSIS — M47.816 LUMBAR SPONDYLOSIS: ICD-10-CM

## 2022-03-17 DIAGNOSIS — K00.9 DENTAL ANOMALY: ICD-10-CM

## 2022-03-17 DIAGNOSIS — F41.9 ANXIETY: ICD-10-CM

## 2022-03-17 DIAGNOSIS — M19.012 PRIMARY OSTEOARTHRITIS OF LEFT SHOULDER: ICD-10-CM

## 2022-03-17 DIAGNOSIS — M15.9 PRIMARY OSTEOARTHRITIS INVOLVING MULTIPLE JOINTS: Primary | ICD-10-CM

## 2022-03-17 DIAGNOSIS — F32.A DEPRESSIVE DISORDER: ICD-10-CM

## 2022-03-17 DIAGNOSIS — E16.2 HYPOGLYCEMIA: ICD-10-CM

## 2022-03-17 DIAGNOSIS — M47.812 SPONDYLOSIS OF CERVICAL REGION WITHOUT MYELOPATHY OR RADICULOPATHY: ICD-10-CM

## 2022-03-17 LAB
EXPIRATION DATE: NORMAL
HBA1C MFR BLD: 5.3 %
Lab: NORMAL

## 2022-03-17 PROCEDURE — 83036 HEMOGLOBIN GLYCOSYLATED A1C: CPT | Performed by: PHYSICIAN ASSISTANT

## 2022-03-17 PROCEDURE — 3044F HG A1C LEVEL LT 7.0%: CPT | Performed by: PHYSICIAN ASSISTANT

## 2022-03-17 PROCEDURE — 99214 OFFICE O/P EST MOD 30 MIN: CPT | Performed by: PHYSICIAN ASSISTANT

## 2022-03-17 RX ORDER — TRAMADOL HYDROCHLORIDE 50 MG/1
100 TABLET ORAL EVERY 6 HOURS PRN
Qty: 240 TABLET | Refills: 5 | Status: SHIPPED | OUTPATIENT
Start: 2022-03-17 | End: 2022-09-09 | Stop reason: SDUPTHER

## 2022-03-17 RX ORDER — SERTRALINE HYDROCHLORIDE 25 MG/1
25 TABLET, FILM COATED ORAL DAILY
Qty: 90 TABLET | Refills: 3 | Status: SHIPPED | OUTPATIENT
Start: 2022-03-17 | End: 2022-03-17 | Stop reason: DRUGHIGH

## 2022-03-17 RX ORDER — AZITHROMYCIN 250 MG/1
TABLET, FILM COATED ORAL
Qty: 6 TABLET | Refills: 0 | Status: SHIPPED | OUTPATIENT
Start: 2022-03-17 | End: 2022-06-22

## 2022-03-17 RX ORDER — BUPROPION HYDROCHLORIDE 150 MG/1
150 TABLET ORAL 2 TIMES DAILY
Qty: 180 TABLET | Refills: 3 | Status: SHIPPED | OUTPATIENT
Start: 2022-03-17 | End: 2023-01-19 | Stop reason: SDUPTHER

## 2022-03-17 NOTE — PROGRESS NOTES
"Subjective   Marci Snyder is a 71 y.o. female  Osteoarthritis (Follow up on osteoarthritis, refill on tramadol ) and Med Refill (Med refill on Wellbutrin and Zoloft)      History of Present Illness     The patient consents to being recorded using [employer] services.     Patient is a very pleasant 71-year-old female comes in today for follow-up on depression, arthritis, and pain management related to arthritis. She is accompanied by an adult female.           The patient reports she is feeling well since she began the medication she was prescribed. She is able to get around the house okay. She denies back pain and took her medication for it today. The patient takes her medication 3 times a day and her back feels fine as long as she takes her medication. The patient reports she rides her e-bike on the trail and does not do it on the road.    The patient reports her feet are not doing well and states nothing can be done to it except surgery. She currently has a bunion and states it is not infected. The patient saw a doctor and an x-ray was obtained and she received a cortisone injection. She states she should have had surgery 2 months ago but \"she was putting it off\" and is unsure why. Her doctor is going to remove the bunion and arthritis with it. The patient reports experiencing foot pain. She will wear boots for some time and is not able to drive for 6 weeks. The patient states she will try to \"put off\" her surgery until 09/2022 or 10/2022. She states she has 4 athletic sandals.     The patient reports she gets \"really hot\" and experiences sweating around 11:00AM or 12:00pm. This has occurred 4 times, but not every day. The symptoms are similar to a drop in blood glucose and she would like to obtain blood work. She thought it may be due to the waffle but she is not always eating it. She did not eat eggs, but she did drink coffee with the waffles. The patient reports her dentist prescribed her medication for her dry " "mouth. She takes 2 a day. She would like a prescription of Z-shane for her sinuses. She tolerates Wellbutrin and Zoloft well. The patient states sometimes she feels like she has anxiety but when she goes on the medication \"it should take care of it\".     The following portions of the patient's history were reviewed and updated as appropriate: allergies, current medications, past social history and problem list    Review of Systems   Constitutional: Positive for diaphoresis. Negative for activity change, appetite change and fatigue.   Respiratory: Negative for chest tightness and shortness of breath.    Gastrointestinal: Negative for abdominal pain, diarrhea and nausea.   Endocrine: Positive for heat intolerance.   Musculoskeletal: Positive for arthralgias, back pain, joint swelling and myalgias. Negative for gait problem.        Multiple Areas   Skin: Negative.    Neurological: Negative for dizziness, tremors, weakness, light-headedness, numbness and headaches.   Psychiatric/Behavioral: Positive for sleep disturbance. Negative for agitation, behavioral problems, confusion, decreased concentration, dysphoric mood and suicidal ideas. The patient is nervous/anxious.        Objective     Vitals:    03/17/22 1444   BP: 144/72   Pulse: 77   Temp: 97.2 °F (36.2 °C)   SpO2: 97%     A1c 5.3 discussed with patient.  Physical Exam  Vitals and nursing note reviewed.   Constitutional:       General: She is not in acute distress.     Appearance: Normal appearance. She is well-developed. She is not ill-appearing, toxic-appearing or diaphoretic.   HENT:      Head: Normocephalic and atraumatic.   Neck:      Thyroid: No thyroid mass or thyromegaly.      Vascular: No carotid bruit or JVD.   Cardiovascular:      Rate and Rhythm: Normal rate and regular rhythm.      Pulses: Normal pulses.      Heart sounds: Normal heart sounds. No murmur heard.  Pulmonary:      Effort: Pulmonary effort is normal. No respiratory distress.      Breath " sounds: Normal breath sounds.   Abdominal:      Palpations: Abdomen is soft.      Tenderness: There is no abdominal tenderness.   Musculoskeletal:         General: Tenderness ( right foot) present. No swelling, deformity or signs of injury.      Comments: Evidence of arthritic changes in right foot and hands.   Skin:     General: Skin is warm and dry.      Coloration: Skin is not pale.      Findings: No erythema or rash.      Comments:  No evidence for perspiration or diaphoresis at this time   Neurological:      Mental Status: She is alert and oriented to person, place, and time.      Motor: No abnormal muscle tone.      Coordination: Coordination normal.   Psychiatric:         Attention and Perception: Attention and perception normal. She is attentive.         Mood and Affect: Mood is anxious. Mood is not depressed. Affect is not angry or inappropriate.         Speech: Speech normal.         Behavior: Behavior normal.         Thought Content: Thought content normal.         Cognition and Memory: Cognition normal.         Judgment: Judgment normal.         Assessment/Plan     Diagnoses and all orders for this visit:    1. Primary osteoarthritis involving multiple joints (Primary)  -     traMADol (ULTRAM) 50 MG tablet; Take 2 tablets by mouth Every 6 (Six) Hours As Needed for Severe Pain .  Dispense: 240 tablet; Refill: 5    2. Depressive disorder  -     buPROPion XL (WELLBUTRIN XL) 150 MG 24 hr tablet; Take 1 tablet by mouth 2 (Two) Times a Day.  Dispense: 180 tablet; Refill: 3    3. Primary osteoarthritis of left shoulder    4. Anxiety    5. Hypoglycemia  -     POC Glycosylated Hemoglobin (Hb A1C)    6. 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  Dispense: 240 tablet; Refill: 5    7. Lumbar spondylosis  -     traMADol (ULTRAM) 50 MG tablet; Take 2 tablets by mouth Every 6 (Six)  Hours As Needed for Severe Pain .  Dispense: 240 tablet; Refill: 5    8. Dental anomaly    Other orders  -     Discontinue: sertraline (ZOLOFT) 25 MG tablet; Take 1 tablet by mouth Daily.  Dispense: 90 tablet; Refill: 3  -     sertraline (Zoloft) 50 MG tablet; Take 1 tablet by mouth Daily. New dose for anxiety  Dispense: 90 tablet; Refill: 3  -     azithromycin (Zithromax Z-Cesar) 250 MG tablet; Take 2 tablets by mouth on day 1, then 1 tablet daily on days 2-5  Dispense: 6 tablet; Refill: 0       1. Primary osteoarthritis involving multiple joints (Primary)        - Prescription for tramadol 50mg was sent.     2. Depressive disorder       3. Primary osteoarthritis of left shoulder     4. Anxiety        - She is experiencing more anxiety.     5. Hypoglycemia        - POC Glycosylated Hemoglobin (Hb A1C)        - Experiencing episode of breaking out into a sweat and experienced lightheadedness at 4 separate times around mid morning.     6. Spondylosis of cervical region without myelopathy or radiculopathy        - Prescription for tramadol 50mg was sent.     7. Lumbar spondylosis        - Prescription for tramadol 50mg was sent.     8. Dental anomaly        - She was prescribed new medication that is an anticholinergic by her dentist which may possibly be related to her symptoms.    Transcribed from ambient dictation for Saranya Terrell PA-C by Drake Childers.  03/17/22   18:21 EDT    Patient verbalized consent to the visit recording.  I have personally performed the services described in this document as transcribed by the above individual, and it is both accurate and complete.  Saranya Terrell PA-C  3/18/2022  13:36 EDT

## 2022-03-25 ENCOUNTER — APPOINTMENT (OUTPATIENT)
Dept: MAMMOGRAPHY | Facility: HOSPITAL | Age: 72
End: 2022-03-25

## 2022-04-20 ENCOUNTER — TELEPHONE (OUTPATIENT)
Dept: PAIN MEDICINE | Facility: CLINIC | Age: 72
End: 2022-04-20

## 2022-04-20 NOTE — TELEPHONE ENCOUNTER
"  Caller: Marci Snyder    Relationship: Self    Best call back number: [phone] MAY LEAVE A MESSAGE    What is the best time to reach you: ANYTIME     Do you require a callback: YES.  PT IS CALLING TO R/S HER APPT THAT WAS CANCELLED FOR TOMORROW 04/21/2022. THERE IS A NOTE UNDER \"CANCEL NOTES\"  WITHIN THE APPT ON 04/21/2022 THAT SHE HAS HAD 5 CANCELLED APPTS.    ATTEMPTED TO WARM TRANSFER TO SEE IF I CAN RESCHEDULE. PLEASE CALL PATIENT. THANK YOU          "

## 2022-04-20 NOTE — TELEPHONE ENCOUNTER
Called and advised pt that due to 5 cancelled appointments she will not be r/s with this practice and that she needs to contact her referring provider to see where she needs to go. Pt understood.

## 2022-04-28 ENCOUNTER — APPOINTMENT (OUTPATIENT)
Dept: MAMMOGRAPHY | Facility: HOSPITAL | Age: 72
End: 2022-04-28

## 2022-05-11 ENCOUNTER — OFFICE VISIT (OUTPATIENT)
Dept: ORTHOPEDIC SURGERY | Facility: CLINIC | Age: 72
End: 2022-05-11

## 2022-05-11 VITALS
SYSTOLIC BLOOD PRESSURE: 130 MMHG | BODY MASS INDEX: 22.96 KG/M2 | WEIGHT: 154.98 LBS | HEIGHT: 69 IN | DIASTOLIC BLOOD PRESSURE: 78 MMHG

## 2022-05-11 DIAGNOSIS — M79.671 RIGHT FOOT PAIN: Primary | ICD-10-CM

## 2022-05-11 PROCEDURE — 99213 OFFICE O/P EST LOW 20 MIN: CPT | Performed by: ORTHOPAEDIC SURGERY

## 2022-05-11 NOTE — PROGRESS NOTES
ESTABLISHED PATIENT    Patient: Marci Snyder  : 1950    Primary Care Provider: Jimbo Vuong MD    Requesting Provider: As above    Follow-up (2 months- Right foot pain)      History    Chief Complaint: Right foot pain    History of Present Illness: She returns unexpectedly, she notes she is having more pain in the third toe where it rubs at night.  She tried some silicone spacers that did not seem to help or make it worse.    Current Outpatient Medications on File Prior to Visit   Medication Sig Dispense Refill   • Allergy 4 MG tablet Take 4 mg by mouth Daily. WAL-FINATE     • Apoaequorin (Prevagen Extra Strength) 20 MG capsule Take 1 tablet by mouth Daily. 30 capsule 11   • azelastine (OPTIVAR) 0.05 % ophthalmic solution azelastine 0.05 % eye drops     • azithromycin (Zithromax Z-Cesar) 250 MG tablet Take 2 tablets by mouth on day 1, then 1 tablet daily on days 2-5 6 tablet 0   • buPROPion XL (WELLBUTRIN XL) 150 MG 24 hr tablet Take 1 tablet by mouth 2 (Two) Times a Day. 180 tablet 3   • cetirizine (ZyrTEC) 10 MG tablet Take 10 mg by mouth daily.     • Cholecalciferol (Vitamin D3) 1.25 MG (64749 UT) tablet Take 1 tablet by mouth Daily. STOPPED TAKING FOR UPCOMING PROCEDURE     • DHEA 50 MG capsule Take 1 capsule by mouth Daily.     • Diclofenac Sodium (VOLTAREN) 1 % gel gel Apply 2 g topically to the appropriate area as directed 2 (Two) Times a Day. For neck pain 100 g 11   • fluocinonide-emollient (LIDEX-E) 0.05 % cream Apply  topically to the appropriate area as directed 2 (Two) Times a Day. 30 g 1   • FLUZONE HIGH-DOSE 0.5 ML suspension prefilled syringe injection inject 0.5 milliliter intramuscularly  0   • levocetirizine (XYZAL) 5 MG tablet Take 5 mg by mouth Daily.  0   • levothyroxine (SYNTHROID, LEVOTHROID) 25 MCG tablet Take 1 tablet by mouth Daily. 90 tablet 3   • loratadine (CLARITIN) 10 MG tablet Take 1 tablet by mouth Daily. 90 tablet 3   • Melatonin 10 MG capsule Take 5  capsules by mouth Every Night.     • methscopolamine (PAMINE) 2.5 MG tablet Take 1 tablet by mouth 2 (Two) Times a Day As Needed (congestion). (Patient taking differently: Take 2.5 mg by mouth 4 (Four) Times a Day.) 120 tablet 5   • montelukast (SINGULAIR) 10 MG tablet Take 1 tablet by mouth Every Night. (Patient taking differently: Take 10 mg by mouth Daily.) 90 tablet 3   • naproxen (NAPROSYN) 500 MG tablet TAKE 1 TABLET BY MOUTH TWICE DAILY WITH MEALS 180 tablet 3   • pilocarpine (SALAGEN) 5 MG tablet TAKE 3 TABLETS BY MOUTH DAILY. INCREASE TO 4 AS NEEDED     • PreviDent 5000 Booster Plus 1.1 % paste See Admin Instructions.     • sertraline (Zoloft) 50 MG tablet Take 1 tablet by mouth Daily. New dose for anxiety 90 tablet 3   • traMADol (ULTRAM) 50 MG tablet Take 2 tablets by mouth Every 6 (Six) Hours As Needed for Severe Pain . 240 tablet 5     Current Facility-Administered Medications on File Prior to Visit   Medication Dose Route Frequency Provider Last Rate Last Admin   • cyanocobalamin injection 1,000 mcg  1,000 mcg Intramuscular Q28 Days Saranya Terrell PA-C   1,000 mcg at 10/08/19 1648   • cyanocobalamin injection 1,000 mcg  1,000 mcg Intramuscular Q28 Days Saranya Terrell PA-C   1,000 mcg at 01/31/20 1454   • cyanocobalamin injection 1,000 mcg  1,000 mcg Intramuscular Q28 Days Saranya Terrell PA-C   1,000 mcg at 11/04/21 1453      Allergies   Allergen Reactions   • Penicillins Anaphylaxis     Prev. Tolerated Keflex      Past Medical History:   Diagnosis Date   • Allergic rhinitis    • Benign paroxysmal positional vertigo    • Bursitis/tendonitis, shoulder    • Chronic nonsuppurative otitis media    • Depressive disorder    • Fatigue    • Headache, cervicogenic    • Hypothyroidism    • Insomnia    • Myalgia    • Nail fungus    • Nervousness    • OA (osteoarthritis) of shoulder    • CHIQUITA (obstructive sleep apnea)     COMPLIANT WITH CPAP USE   • Osteoporosis    • Preventive measure    • Sciatica of  "right side    • TSH (thyroid-stimulating hormone deficiency)    • Vitamin D deficiency    • Wears glasses      Past Surgical History:   Procedure Laterality Date   • AMPUTATION DIGIT Right 7/13/2021    Procedure: AMPUTATE 3RD TOE AT PIP JOINT RIGHT;  Surgeon: Gwendolyn Del Rosario MD;  Location: [facility];  Service: Orthopedics;  Laterality: Right;   • COLONOSCOPY     • FOOT SURGERY Right     pinched nerve   • FOOT SURGERY Left 01/17/2019    1st MPJ fusion - Dr. Gwendolyn Del Rosario ; [facility]    • NOSE SURGERY  1985    DEVIATED SEPTUM     Family History   Problem Relation Age of Onset   • Stroke Mother    • Cancer Father    • Breast cancer Sister 73   • Breast cancer Paternal Aunt         DX AGE UNKNOWN   • Breast cancer Cousin         SEVERAL COUSINS - DX AGES UNKNOWN   • Breast cancer Paternal Aunt         DX AGE UNKNOWN   • Ovarian cancer Neg Hx       Social History     Socioeconomic History   • Marital status:    Tobacco Use   • Smoking status: Never Smoker   • Smokeless tobacco: Never Used   Vaping Use   • Vaping Use: Never used   Substance and Sexual Activity   • Alcohol use: Yes     Comment: social   • Drug use: No   • Sexual activity: Yes        Review of Systems   Constitutional: Negative.    HENT: Negative.    Eyes: Negative.    Respiratory: Negative.    Cardiovascular: Negative.    Gastrointestinal: Negative.    Endocrine: Negative.    Genitourinary: Negative.    Musculoskeletal: Positive for arthralgias.   Skin: Negative.    Allergic/Immunologic: Negative.    Neurological: Negative.    Hematological: Negative.    Psychiatric/Behavioral: Negative.        The following portions of the patient's history were reviewed and updated as appropriate: allergies, current medications, past family history, past medical history, past social history, past surgical history and problem list.    Physical Exam:   /78   Ht 174 cm (68.5\")   Wt 70.3 kg (154 lb 15.7 oz)   LMP  (LMP Unknown)   BMI 23.22 " kg/m²   GENERAL: No change in a prominent hallux valgus metatarsus primus varus on the right, no ulceration over the bunion, the amputated third toe is clean with no signs of infection, no calluses no ulcers    Medical Decision Making    Data Review:   none    Assessment/Plan/Diagnosis/Treatment Options:   1. Right foot pain  70 irritation of the amputated toe from rubbing on the other toes.  I recommend dancers lambswool.  That should help with the silicone sleeves made it worse.  I will see her again in August to probably plan for fusion of the right first MTPJ, standing 2 views of the foot at that time        Gwendolyn Huff MD

## 2022-05-19 ENCOUNTER — TELEPHONE (OUTPATIENT)
Dept: FAMILY MEDICINE CLINIC | Facility: CLINIC | Age: 72
End: 2022-05-19

## 2022-05-19 NOTE — TELEPHONE ENCOUNTER
Caller: Marci Snyder    Relationship: Self    Best call back number: [phone]    What orders are you requesting (i.e. lab or imaging): ANNUAL LAB WORK    In what timeframe would the patient need to come in: TODAY    Where will you receive your lab/imaging services: [facility]    Additional notes: PATIENT WAS SEEN IN MARCH AND WAS SUPPOSE TO DO LAB WORK HOWEVER THERE ARE NO ORDERS IN SYSTEM. PLEASE CALL PATIENT WHEN ORDERS HAVE BEEN PLACED

## 2022-06-01 ENCOUNTER — HOSPITAL ENCOUNTER (OUTPATIENT)
Dept: MAMMOGRAPHY | Facility: HOSPITAL | Age: 72
Discharge: HOME | End: 2022-06-01
Admitting: FAMILY MEDICINE

## 2022-06-01 DIAGNOSIS — Z12.31 ENCOUNTER FOR SCREENING MAMMOGRAM FOR MALIGNANT NEOPLASM OF BREAST: ICD-10-CM

## 2022-06-01 PROCEDURE — 77063 BREAST TOMOSYNTHESIS BI: CPT

## 2022-06-01 PROCEDURE — 77063 BREAST TOMOSYNTHESIS BI: CPT | Performed by: RADIOLOGY

## 2022-06-01 PROCEDURE — 77067 SCR MAMMO BI INCL CAD: CPT

## 2022-06-01 PROCEDURE — 77067 SCR MAMMO BI INCL CAD: CPT | Performed by: RADIOLOGY

## 2022-06-22 ENCOUNTER — OFFICE VISIT (OUTPATIENT)
Dept: ORTHOPEDIC SURGERY | Facility: CLINIC | Age: 72
End: 2022-06-22

## 2022-06-22 ENCOUNTER — OFFICE VISIT (OUTPATIENT)
Dept: FAMILY MEDICINE CLINIC | Facility: CLINIC | Age: 72
End: 2022-06-22

## 2022-06-22 VITALS
BODY MASS INDEX: 22.36 KG/M2 | SYSTOLIC BLOOD PRESSURE: 108 MMHG | DIASTOLIC BLOOD PRESSURE: 72 MMHG | WEIGHT: 151 LBS | HEIGHT: 69 IN

## 2022-06-22 VITALS
TEMPERATURE: 97.3 F | HEIGHT: 69 IN | BODY MASS INDEX: 23.11 KG/M2 | SYSTOLIC BLOOD PRESSURE: 128 MMHG | WEIGHT: 156 LBS | OXYGEN SATURATION: 99 % | HEART RATE: 75 BPM | DIASTOLIC BLOOD PRESSURE: 74 MMHG

## 2022-06-22 DIAGNOSIS — N64.59 OTHER SIGNS AND SYMPTOMS IN BREAST: ICD-10-CM

## 2022-06-22 DIAGNOSIS — L30.9 DERMATITIS: ICD-10-CM

## 2022-06-22 DIAGNOSIS — M79.671 FOOT PAIN, RIGHT: Primary | ICD-10-CM

## 2022-06-22 DIAGNOSIS — Z12.39 BREAST CANCER SCREENING, HIGH RISK PATIENT: Primary | ICD-10-CM

## 2022-06-22 DIAGNOSIS — B00.1 FEVER BLISTER: ICD-10-CM

## 2022-06-22 PROCEDURE — 99213 OFFICE O/P EST LOW 20 MIN: CPT | Performed by: PHYSICIAN ASSISTANT

## 2022-06-22 PROCEDURE — 99213 OFFICE O/P EST LOW 20 MIN: CPT | Performed by: ORTHOPAEDIC SURGERY

## 2022-06-22 RX ORDER — ACYCLOVIR 50 MG/G
1 OINTMENT TOPICAL
Qty: 5 G | Refills: 5 | Status: SHIPPED | OUTPATIENT
Start: 2022-06-22

## 2022-06-22 RX ORDER — FLUOCINONIDE GEL 0.5 MG/G
1 GEL TOPICAL 2 TIMES DAILY
Qty: 60 G | Refills: 2 | Status: SHIPPED | OUTPATIENT
Start: 2022-06-22

## 2022-06-22 RX ORDER — ACYCLOVIR 400 MG/1
400 TABLET ORAL
Qty: 25 TABLET | Refills: 5 | Status: SHIPPED | OUTPATIENT
Start: 2022-06-22

## 2022-06-22 NOTE — PROGRESS NOTES
Subjective   Marci Snyder is a 71 y.o. female  Itching (Itching on arms due to sun, req medication or cream), Follow-up (Follow up on Mammogram, req order for MRI), and Mouth Lesions (Req medication for cold sore)      History of Present Illness    The patient reports that she has sun poisoning. She states she gets it every summer. She has been using a medication that is not working.    The patient reports that she has a fever blister that is healing. The patient reports that she gets fever blisters all the time in the winter. She reports effectiveness of the pill. She is currently using Blistex.     The patient had breast density on her mammogram. She was told to reschedule another mammogram, which she did. She was told to allow 3 hours for the mammogram. She asked for an MRI and was told that a doctor would need to order the MRI. She was told this was the next procedure to have the insurance cover. Her sister passed away from breast cancer. She also states that every family member has passed away from cancer. She reported having an MRI 5 years ago because of a similar situation.       The following portions of the patient's history were reviewed and updated as appropriate: allergies, current medications, past social history and problem list    Review of Systems   Constitutional: Negative for fever.   Respiratory: Negative.    Cardiovascular: Negative.    Musculoskeletal: Negative for arthralgias.   Skin: Positive for color change and rash. Negative for pallor and wound.   Hematological: Negative.        Objective     Vitals:    06/22/22 1617   BP: 128/74   Pulse: 75   Temp: 97.3 °F (36.3 °C)   SpO2: 99%       Physical Exam  Vitals and nursing note reviewed.   Constitutional:       General: She is not in acute distress.     Appearance: Normal appearance. She is well-developed and normal weight. She is not ill-appearing, toxic-appearing or diaphoretic.   HENT:      Head: Normocephalic and atraumatic.    Skin:     General: Skin is warm and dry.      Coloration: Skin is not pale.      Findings: Erythema and rash present.      Comments: Fever blister on lip and erythematous contact dermatitis left arm   Neurological:      Mental Status: She is alert and oriented to person, place, and time.   Psychiatric:         Mood and Affect: Mood normal.         Behavior: Behavior normal.         Assessment & Plan     Diagnoses and all orders for this visit:    1. Breast cancer screening, high risk patient (Primary)  -     MRI Breast Bilateral Screening With & Without Contrast; Future    2. Other signs and symptoms in breast   -     MRI Breast Bilateral Screening With & Without Contrast; Future    3. Dermatitis    4. Fever blister    Other orders  -     fluocinonide (LIDEX) 0.05 % gel; Apply 1 application topically to the appropriate area as directed 2 (Two) Times a Day. To sun rash on arms  Dispense: 60 g; Refill: 2  -     acyclovir (Zovirax) 400 MG tablet; Take 1 tablet by mouth 5 (Five) Times a Day. For fever blisters  Dispense: 25 tablet; Refill: 5  -     acyclovir (Zovirax) 5 % ointment; Apply 1 application topically to the appropriate area as directed Every 3 (Three) Hours. For fever blisters  Dispense: 5 g; Refill: 5     Transcribed from ambient dictation for Saranya Terrell PA-C by Briana Olson.  06/22/22   18:02 EDT    Patient verbalized consent to the visit recording.

## 2022-06-22 NOTE — PROGRESS NOTES
ESTABLISHED PATIENT    Patient: Marci Snyder  : 1950    Primary Care Provider: Jimbo Vuong MD    Requesting Provider: As above    Follow-up (Right foot pain)      History    Chief Complaint: Right third toe pain    History of Present Illness: She has developed a new corn on the right third toe and wants to know what to do for it.  She would like to have surgery on the bunion in November.    Current Outpatient Medications on File Prior to Visit   Medication Sig Dispense Refill   • Allergy 4 MG tablet Take 4 mg by mouth Daily. WAL-FINATE     • Apoaequorin (Prevagen Extra Strength) 20 MG capsule Take 1 tablet by mouth Daily. 30 capsule 11   • azelastine (OPTIVAR) 0.05 % ophthalmic solution azelastine 0.05 % eye drops     • azithromycin (Zithromax Z-Cesar) 250 MG tablet Take 2 tablets by mouth on day 1, then 1 tablet daily on days 2-5 6 tablet 0   • buPROPion XL (WELLBUTRIN XL) 150 MG 24 hr tablet Take 1 tablet by mouth 2 (Two) Times a Day. 180 tablet 3   • cetirizine (ZyrTEC) 10 MG tablet Take 10 mg by mouth daily.     • Cholecalciferol (Vitamin D3) 1.25 MG (25923 UT) tablet Take 1 tablet by mouth Daily. STOPPED TAKING FOR UPCOMING PROCEDURE     • DHEA 50 MG capsule Take 1 capsule by mouth Daily.     • Diclofenac Sodium (VOLTAREN) 1 % gel gel Apply 2 g topically to the appropriate area as directed 2 (Two) Times a Day. For neck pain 100 g 11   • fluocinonide-emollient (LIDEX-E) 0.05 % cream Apply  topically to the appropriate area as directed 2 (Two) Times a Day. 30 g 1   • FLUZONE HIGH-DOSE 0.5 ML suspension prefilled syringe injection inject 0.5 milliliter intramuscularly  0   • levocetirizine (XYZAL) 5 MG tablet Take 5 mg by mouth Daily.  0   • levothyroxine (SYNTHROID, LEVOTHROID) 25 MCG tablet Take 1 tablet by mouth Daily. 90 tablet 3   • loratadine (CLARITIN) 10 MG tablet Take 1 tablet by mouth Daily. 90 tablet 3   • Melatonin 10 MG capsule Take 5 capsules by mouth Every Night.     •  methscopolamine (PAMINE) 2.5 MG tablet Take 1 tablet by mouth 2 (Two) Times a Day As Needed (congestion). (Patient taking differently: Take 2.5 mg by mouth 4 (Four) Times a Day.) 120 tablet 5   • montelukast (SINGULAIR) 10 MG tablet Take 1 tablet by mouth Every Night. (Patient taking differently: Take 10 mg by mouth Daily.) 90 tablet 3   • mupirocin (BACTROBAN) 2 % ointment Apply 1 application topically to the appropriate area as directed Daily. 66 g 5   • naproxen (NAPROSYN) 500 MG tablet TAKE 1 TABLET BY MOUTH TWICE DAILY WITH MEALS 180 tablet 3   • pilocarpine (SALAGEN) 5 MG tablet TAKE 3 TABLETS BY MOUTH DAILY. INCREASE TO 4 AS NEEDED     • PreviDent 5000 Booster Plus 1.1 % paste See Admin Instructions.     • sertraline (Zoloft) 50 MG tablet Take 1 tablet by mouth Daily. New dose for anxiety 90 tablet 3   • traMADol (ULTRAM) 50 MG tablet Take 2 tablets by mouth Every 6 (Six) Hours As Needed for Severe Pain . 240 tablet 5     Current Facility-Administered Medications on File Prior to Visit   Medication Dose Route Frequency Provider Last Rate Last Admin   • cyanocobalamin injection 1,000 mcg  1,000 mcg Intramuscular Q28 Days Saranya Terrell PA-C   1,000 mcg at 10/08/19 1648   • cyanocobalamin injection 1,000 mcg  1,000 mcg Intramuscular Q28 Days Saranya Terrell PA-C   1,000 mcg at 01/31/20 1454   • cyanocobalamin injection 1,000 mcg  1,000 mcg Intramuscular Q28 Days Saranya Terrell PA-C   1,000 mcg at 11/04/21 1453      Allergies   Allergen Reactions   • Penicillins Anaphylaxis     Prev. Tolerated Keflex      Past Medical History:   Diagnosis Date   • Allergic rhinitis    • Benign paroxysmal positional vertigo    • Bursitis/tendonitis, shoulder    • Chronic nonsuppurative otitis media    • Depressive disorder    • Fatigue    • Headache, cervicogenic    • Hypothyroidism    • Insomnia    • Myalgia    • Nail fungus    • Nervousness    • OA (osteoarthritis) of shoulder    • CHIQUITA (obstructive sleep apnea)      COMPLIANT WITH CPAP USE   • Osteoporosis    • Preventive measure    • Sciatica of right side    • TSH (thyroid-stimulating hormone deficiency)    • Vitamin D deficiency    • Wears glasses      Past Surgical History:   Procedure Laterality Date   • AMPUTATION DIGIT Right 7/13/2021    Procedure: AMPUTATE 3RD TOE AT PIP JOINT RIGHT;  Surgeon: Gwendolyn Del Rosario MD;  Location: [facility] OR;  Service: Orthopedics;  Laterality: Right;   • COLONOSCOPY     • FOOT SURGERY Right     pinched nerve   • FOOT SURGERY Left 01/17/2019    1st MPJ fusion - Dr. Gwendolyn Del Rosario ; [facility]    • NOSE SURGERY  1985    DEVIATED SEPTUM     Family History   Problem Relation Age of Onset   • Stroke Mother    • Cancer Father    • Breast cancer Sister 73   • Breast cancer Paternal Aunt         DX AGE UNKNOWN   • Breast cancer Paternal Aunt         DX AGE UNKNOWN   • Breast cancer Cousin         SEVERAL COUSINS - DX AGES UNKNOWN   • Ovarian cancer Neg Hx       Social History     Socioeconomic History   • Marital status:    Tobacco Use   • Smoking status: Never Smoker   • Smokeless tobacco: Never Used   Vaping Use   • Vaping Use: Never used   Substance and Sexual Activity   • Alcohol use: Yes     Comment: social   • Drug use: No   • Sexual activity: Yes        Review of Systems   Constitutional: Negative.    HENT: Negative.    Eyes: Negative.    Respiratory: Negative.    Cardiovascular: Negative.    Gastrointestinal: Negative.    Endocrine: Negative.    Genitourinary: Negative.    Musculoskeletal: Positive for arthralgias.   Skin: Negative.    Allergic/Immunologic: Negative.    Neurological: Negative.    Hematological: Negative.    Psychiatric/Behavioral: Negative.        The following portions of the patient's history were reviewed and updated as appropriate: allergies, current medications, past family history, past medical history, past social history, past surgical history and problem list.    Physical Exam:   LMP  (LMP  Unknown)   She does have a corn on the third toe    Medical Decision Making    Data Review:   ordered and reviewed x-rays today    Assessment/Plan/Diagnosis/Treatment Options:   1. Foot pain, right  I showed her how to use silicone sleeves to help protect this.  We talked about how to use a callus file.  I will see her in September to consider scheduling surgery to do a fusion of the first MTPJ in November  - XR Foot 2 View Right        Gwendolyn Huff MD

## 2022-07-07 DIAGNOSIS — E03.9 ACQUIRED HYPOTHYROIDISM: ICD-10-CM

## 2022-07-07 RX ORDER — LEVOTHYROXINE SODIUM 0.03 MG/1
TABLET ORAL
Qty: 90 TABLET | Refills: 3 | Status: SHIPPED | OUTPATIENT
Start: 2022-07-07

## 2022-07-20 ENCOUNTER — TELEPHONE (OUTPATIENT)
Dept: FAMILY MEDICINE CLINIC | Facility: CLINIC | Age: 72
End: 2022-07-20

## 2022-07-20 NOTE — TELEPHONE ENCOUNTER
Caller: Laurie Snyder    Relationship: Self    Best call back number:     What is the best time to reach you: ANYTIME    Who are you requesting to speak with (clinical staff, provider,  specific staff member): CARI ALDRICH    Do you know the name of the person who called: LAURIE    What was the call regarding: PATIENT WAS ORDERED AN MRI OF HER BREAST; HOWEVER SHE ADVISED THE RADIOLOGIST WANTS HER TO HAVE A 3 HOUR MAMMOGRAM PRIOR TO THE MRII THAT IS DUE THE WEEK AFTER THE MAMMOGRAMS; SHE HAS CONCERNS WHY THEY ARE ASKING FOR HER TO DO THIS    Do you require a callback: YES

## 2022-07-20 NOTE — TELEPHONE ENCOUNTER
I have no idea, I haven't heard of this before, I would advise she call to talk to the radiologist about their recommendation

## 2022-07-21 NOTE — TELEPHONE ENCOUNTER
Patient was verbally notified, she will continue with the mammogram and then the \ MRI per insurance protocols

## 2022-08-04 ENCOUNTER — HOSPITAL ENCOUNTER (OUTPATIENT)
Dept: MAMMOGRAPHY | Facility: HOSPITAL | Age: 72
Discharge: HOME | End: 2022-08-04

## 2022-08-04 ENCOUNTER — HOSPITAL ENCOUNTER (OUTPATIENT)
Dept: ULTRASOUND IMAGING | Facility: HOSPITAL | Age: 72
Discharge: HOME | End: 2022-08-04

## 2022-08-04 DIAGNOSIS — R92.8 ABNORMAL MAMMOGRAM: ICD-10-CM

## 2022-08-04 PROCEDURE — G0279 TOMOSYNTHESIS, MAMMO: HCPCS

## 2022-08-04 PROCEDURE — 76642 ULTRASOUND BREAST LIMITED: CPT

## 2022-08-04 PROCEDURE — 77065 DX MAMMO INCL CAD UNI: CPT

## 2022-08-04 PROCEDURE — 76642 ULTRASOUND BREAST LIMITED: CPT | Performed by: RADIOLOGY

## 2022-08-04 PROCEDURE — 77065 DX MAMMO INCL CAD UNI: CPT | Performed by: RADIOLOGY

## 2022-08-04 PROCEDURE — G0279 TOMOSYNTHESIS, MAMMO: HCPCS | Performed by: RADIOLOGY

## 2022-08-11 ENCOUNTER — HOSPITAL ENCOUNTER (OUTPATIENT)
Dept: MRI IMAGING | Facility: HOSPITAL | Age: 72
Discharge: HOME | End: 2022-08-11
Admitting: PHYSICIAN ASSISTANT

## 2022-08-11 DIAGNOSIS — N64.59 OTHER SIGNS AND SYMPTOMS IN BREAST: ICD-10-CM

## 2022-08-11 DIAGNOSIS — Z12.39 BREAST CANCER SCREENING, HIGH RISK PATIENT: ICD-10-CM

## 2022-08-11 LAB — CREAT BLDA-MCNC: 1.1 MG/DL (ref 0.6–1.3)

## 2022-08-11 PROCEDURE — 77049 MRI BREAST C-+ W/CAD BI: CPT | Performed by: RADIOLOGY

## 2022-08-11 PROCEDURE — A9577 INJ MULTIHANCE: HCPCS | Performed by: PHYSICIAN ASSISTANT

## 2022-08-11 PROCEDURE — 82565 ASSAY OF CREATININE: CPT

## 2022-08-11 PROCEDURE — C8937 CAD BREAST MRI: HCPCS

## 2022-08-11 PROCEDURE — 0 GADOBENATE DIMEGLUMINE 529 MG/ML SOLUTION: Performed by: PHYSICIAN ASSISTANT

## 2022-08-11 PROCEDURE — C8908 MRI W/O FOL W/CONT, BREAST,: HCPCS

## 2022-08-11 RX ADMIN — GADOBENATE DIMEGLUMINE 7 ML: 529 INJECTION, SOLUTION INTRAVENOUS at 13:42

## 2022-08-15 ENCOUNTER — TELEPHONE (OUTPATIENT)
Dept: MRI IMAGING | Facility: HOSPITAL | Age: 72
End: 2022-08-15

## 2022-09-09 ENCOUNTER — OFFICE VISIT (OUTPATIENT)
Dept: FAMILY MEDICINE CLINIC | Facility: CLINIC | Age: 72
End: 2022-09-09

## 2022-09-09 VITALS
TEMPERATURE: 97.2 F | HEART RATE: 80 BPM | OXYGEN SATURATION: 99 % | DIASTOLIC BLOOD PRESSURE: 76 MMHG | SYSTOLIC BLOOD PRESSURE: 126 MMHG | HEIGHT: 69 IN | WEIGHT: 152 LBS | BODY MASS INDEX: 22.51 KG/M2

## 2022-09-09 DIAGNOSIS — M47.816 LUMBAR SPONDYLOSIS: ICD-10-CM

## 2022-09-09 DIAGNOSIS — M15.9 PRIMARY OSTEOARTHRITIS INVOLVING MULTIPLE JOINTS: Primary | ICD-10-CM

## 2022-09-09 DIAGNOSIS — Z23 IMMUNIZATION DUE: ICD-10-CM

## 2022-09-09 DIAGNOSIS — L84 CORN OF TOE: ICD-10-CM

## 2022-09-09 DIAGNOSIS — M47.812 SPONDYLOSIS OF CERVICAL REGION WITHOUT MYELOPATHY OR RADICULOPATHY: ICD-10-CM

## 2022-09-09 PROCEDURE — 99213 OFFICE O/P EST LOW 20 MIN: CPT | Performed by: PHYSICIAN ASSISTANT

## 2022-09-09 PROCEDURE — G0008 ADMIN INFLUENZA VIRUS VAC: HCPCS | Performed by: PHYSICIAN ASSISTANT

## 2022-09-09 PROCEDURE — 90662 IIV NO PRSV INCREASED AG IM: CPT | Performed by: PHYSICIAN ASSISTANT

## 2022-09-09 RX ORDER — TRAMADOL HYDROCHLORIDE 50 MG/1
100 TABLET ORAL EVERY 6 HOURS PRN
Qty: 240 TABLET | Refills: 5 | Status: SHIPPED | OUTPATIENT
Start: 2022-09-09 | End: 2023-01-19 | Stop reason: SDUPTHER
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
## 2022-10-12 ENCOUNTER — OFFICE VISIT (OUTPATIENT)
Dept: ORTHOPEDIC SURGERY | Facility: CLINIC | Age: 72
End: 2022-10-12

## 2022-10-12 VITALS
DIASTOLIC BLOOD PRESSURE: 74 MMHG | HEIGHT: 69 IN | WEIGHT: 152 LBS | SYSTOLIC BLOOD PRESSURE: 122 MMHG | BODY MASS INDEX: 22.51 KG/M2

## 2022-10-12 DIAGNOSIS — M79.671 RIGHT FOOT PAIN: Primary | ICD-10-CM

## 2022-10-12 PROCEDURE — 99213 OFFICE O/P EST LOW 20 MIN: CPT | Performed by: ORTHOPAEDIC SURGERY

## 2022-10-12 RX ORDER — HYDROXYZINE HYDROCHLORIDE 25 MG/1
25 TABLET, FILM COATED ORAL
COMMUNITY
Start: 2022-10-05

## 2022-10-12 RX ORDER — TRIAMCINOLONE ACETONIDE 1 MG/ML
LOTION TOPICAL
COMMUNITY
Start: 2022-10-06

## 2022-10-12 NOTE — PROGRESS NOTES
ESTABLISHED PATIENT    Patient: Marci Snyder  : 1950    Primary Care Provider: Jimbo Vuong MD    Requesting Provider: As above    Follow-up (3.5 month f/u Foot pain, right)      History    Chief Complaint:  Right foot pain    History of Present Illness: She returns for follow-up of her significant right bunion, she notes that the third toe is really hurting much more than anything else.  The toe gets a corn on the lateral aspect.  This is the toe that we amputated for prior infection.  She would like to consider having the remaining toe amputated.  We removed it through the PIP joint.  She reports it hurts much more than her bunion right now    Current Outpatient Medications on File Prior to Visit   Medication Sig Dispense Refill   • acyclovir (Zovirax) 400 MG tablet Take 1 tablet by mouth 5 (Five) Times a Day. For fever blisters 25 tablet 5   • acyclovir (Zovirax) 5 % ointment Apply 1 application topically to the appropriate area as directed Every 3 (Three) Hours. For fever blisters 5 g 5   • Allergy 4 MG tablet Take 4 mg by mouth Daily. WAL-FINATE     • Apoaequorin (Prevagen Extra Strength) 20 MG capsule Take 1 tablet by mouth Daily. 30 capsule 11   • azelastine (OPTIVAR) 0.05 % ophthalmic solution azelastine 0.05 % eye drops     • buPROPion XL (WELLBUTRIN XL) 150 MG 24 hr tablet Take 1 tablet by mouth 2 (Two) Times a Day. 180 tablet 3   • cetirizine (ZyrTEC) 10 MG tablet Take 10 mg by mouth daily.     • Cholecalciferol (Vitamin D3) 1.25 MG (28068 UT) tablet Take 1 tablet by mouth Daily. STOPPED TAKING FOR UPCOMING PROCEDURE     • DHEA 50 MG capsule Take 1 capsule by mouth Daily.     • Diclofenac Sodium (VOLTAREN) 1 % gel gel Apply 2 g topically to the appropriate area as directed 2 (Two) Times a Day. For neck pain 100 g 11   • fluocinonide (LIDEX) 0.05 % gel Apply 1 application topically to the appropriate area as directed 2 (Two) Times a Day. To sun rash on arms 60 g 2   •  fluocinonide-emollient (LIDEX-E) 0.05 % cream Apply  topically to the appropriate area as directed 2 (Two) Times a Day. 30 g 1   • hydrOXYzine (ATARAX) 25 MG tablet Take 1 tablet by mouth every night at bedtime.     • levocetirizine (XYZAL) 5 MG tablet Take 5 mg by mouth Daily.  0   • levothyroxine (SYNTHROID, LEVOTHROID) 25 MCG tablet TAKE 1 TABLET BY MOUTH EVERY MORNING ON AN EMPTY STOMACH AND WAIT 30 MINUTES BEFORE EATING/DRINKING/TAKING OTHER MEDS 90 tablet 3   • loratadine (CLARITIN) 10 MG tablet Take 1 tablet by mouth Daily. 90 tablet 3   • Melatonin 10 MG capsule Take 5 capsules by mouth Every Night.     • methscopolamine (PAMINE) 2.5 MG tablet Take 1 tablet by mouth 2 (Two) Times a Day As Needed (congestion). (Patient taking differently: Take 1 tablet by mouth 4 (Four) Times a Day.) 120 tablet 5   • montelukast (SINGULAIR) 10 MG tablet Take 1 tablet by mouth Every Night. (Patient taking differently: Take 1 tablet by mouth Daily.) 90 tablet 3   • mupirocin (BACTROBAN) 2 % ointment Apply 1 application topically to the appropriate area as directed Daily. 66 g 5   • naproxen (NAPROSYN) 500 MG tablet TAKE 1 TABLET BY MOUTH TWICE DAILY WITH MEALS 180 tablet 3   • pilocarpine (SALAGEN) 5 MG tablet TAKE 3 TABLETS BY MOUTH DAILY. INCREASE TO 4 AS NEEDED     • PreviDent 5000 Booster Plus 1.1 % paste See Admin Instructions.     • sertraline (Zoloft) 50 MG tablet Take 1 tablet by mouth Daily. New dose for anxiety 90 tablet 3   • traMADol (ULTRAM) 50 MG tablet Take 2 tablets by mouth Every 6 (Six) Hours As Needed for Severe Pain. 240 tablet 5   • triamcinolone (KENALOG) 0.1 % lotion APPLY A THIN FILM TO THE AFFECTED AREA TWICE DAILY AS DIRECTED     • [DISCONTINUED] FLUZONE HIGH-DOSE 0.5 ML suspension prefilled syringe injection inject 0.5 milliliter intramuscularly  0     Current Facility-Administered Medications on File Prior to Visit   Medication Dose Route Frequency Provider Last Rate Last Admin   • cyanocobalamin  injection 1,000 mcg  1,000 mcg Intramuscular Q28 Days Saranya Terrell, PA-C   1,000 mcg at 10/08/19 1648   • cyanocobalamin injection 1,000 mcg  1,000 mcg Intramuscular Q28 Days BluesOrtizSaranya N, PA-C   1,000 mcg at 01/31/20 1454   • cyanocobalamin injection 1,000 mcg  1,000 mcg Intramuscular Q28 Days Blues Saranya N, PA-C   1,000 mcg at 11/04/21 1453      Allergies   Allergen Reactions   • Penicillins Anaphylaxis     Prev. Tolerated Keflex      Past Medical History:   Diagnosis Date   • Allergic rhinitis    • Benign paroxysmal positional vertigo    • Bursitis/tendonitis, shoulder    • Chronic nonsuppurative otitis media    • Depressive disorder    • Fatigue    • Headache, cervicogenic    • Hypothyroidism    • Insomnia    • Myalgia    • Nail fungus    • Nervousness    • OA (osteoarthritis) of shoulder    • CHIQUITA (obstructive sleep apnea)     COMPLIANT WITH CPAP USE   • Osteoporosis    • Preventive measure    • Sciatica of right side    • TSH (thyroid-stimulating hormone deficiency)    • Vitamin D deficiency    • Wears glasses      Past Surgical History:   Procedure Laterality Date   • AMPUTATION DIGIT Right 7/13/2021    Procedure: AMPUTATE 3RD TOE AT PIP JOINT RIGHT;  Surgeon: Gwendolyn Del Rosario MD;  Location: [facility];  Service: Orthopedics;  Laterality: Right;   • COLONOSCOPY     • FOOT SURGERY Right     pinched nerve   • FOOT SURGERY Left 01/17/2019    1st MPJ fusion - Dr. Gwendolyn Del Rosario ;  [facility]    • NOSE SURGERY  1985    DEVIATED SEPTUM     Family History   Problem Relation Age of Onset   • Stroke Mother    • Cancer Father    • Breast cancer Sister 73   • Breast cancer Paternal Aunt         DX AGE UNKNOWN   • Breast cancer Paternal Aunt         DX AGE UNKNOWN   • Breast cancer Cousin         SEVERAL COUSINS - DX AGES UNKNOWN   • Ovarian cancer Neg Hx       Social History     Socioeconomic History   • Marital status:    Tobacco Use   • Smoking status: Never   • Smokeless tobacco:  "Never   Vaping Use   • Vaping Use: Never used   Substance and Sexual Activity   • Alcohol use: Yes     Comment: social   • Drug use: No   • Sexual activity: Yes        Review of Systems   Constitutional: Negative.    HENT: Negative.    Eyes: Negative.    Respiratory: Negative.    Cardiovascular: Negative.    Gastrointestinal: Negative.    Endocrine: Negative.    Genitourinary: Negative.    Musculoskeletal: Positive for arthralgias.   Skin: Negative.    Allergic/Immunologic: Negative.    Neurological: Negative.    Hematological: Negative.    Psychiatric/Behavioral: Negative.        The following portions of the patient's history were reviewed and updated as appropriate: allergies, current medications, past family history, past medical history, past social history, past surgical history and problem list.    Physical Exam:   /74   Ht 174 cm (68.5\")   Wt 68.9 kg (152 lb)   LMP  (LMP Unknown)   BMI 22.77 kg/m²   Right foot shows no change in the severe hallux valgus metatarsus primus varus, the third toe has a very painful lateral corn on the lateral aspect at the amputated PIP joint, no signs of infection    Medical Decision Making    Data Review:   none    Assessment/Plan/Diagnosis/Treatment Options:   1. Right foot pain  She would like to avoid bunion surgery right now, her sister has been ill and she does not have the time to be nonweightbearing etc.  She would like to do something simple and since the third toe bothers her the most she would like to have the remainder of the toe amputated.  We can do that as an outpatient.  She can be weightbearing in a postop shoe.  She remembers the stitches come out about 2 to 3 weeks he remembers the normal postop swelling.  We discussed the risks including but not limited to: death, infection, neurovascular damage, strokes, heart attacks, blood clots, chronic pain, deformity, stiffness, need for  further surgery,  amputation, etc.  Questions asked and answered in " detail.              Gwendolyn Huff MD

## 2022-10-13 DIAGNOSIS — M79.671 RIGHT FOOT PAIN: Primary | ICD-10-CM

## 2022-10-21 ENCOUNTER — APPOINTMENT (OUTPATIENT)
Dept: PREADMISSION TESTING | Facility: HOSPITAL | Age: 72
End: 2022-10-21

## 2022-10-26 ENCOUNTER — TELEPHONE (OUTPATIENT)
Dept: FAMILY MEDICINE CLINIC | Facility: CLINIC | Age: 72
End: 2022-10-26

## 2022-11-14 ENCOUNTER — TELEPHONE (OUTPATIENT)
Dept: FAMILY MEDICINE CLINIC | Facility: CLINIC | Age: 72
End: 2022-11-14

## 2022-11-14 NOTE — TELEPHONE ENCOUNTER
Patient was notified to go to ED, she will go to ED if symptoms worsen, she has an appt to follow up with PCP thursday

## 2022-11-14 NOTE — TELEPHONE ENCOUNTER
Caller: Marci Snyder    Relationship to patient: Self    Best call back number: [phone]    What is the call regarding:  PATENT WOULD LIKE TO SEE IF SHE COULD GET SOME LABWORK ORDERED AS SHE HAD SURGERY AND SHE HAS NOT BEEN FEELING WELL.  SHE WOULD LIKE A CALL BACK FROM THE NURSE.

## 2022-11-14 NOTE — TELEPHONE ENCOUNTER
Caller: Marci Snyder    Relationship: Self    Best call back number: [phone]  What orders are you requesting (i.e. lab or imaging): LABS    In what timeframe would the patient need to come in: ASAP    Where will you receive your lab/imaging services: [facility] PHONE NUMBER [phone]    Additional notes: THE PATIENT REPORTS THAT SHE HAS BEEN SWEATING AND NOT FEELING WELL SINCE SHE HAD GALLBLADDER SURGERY TWO WEEKS AGO. THE PATIENT REPORTS SHE HAS HAD HER F/U WITH THE SURGEON AND STATES THE SURGEON ADVISED THAT SHE DID NOT BELIEVE THE SYMPTOMS ARE NOT CONNECTED TO HER HAVING SURGERY AND IS REQUESTING LAB ORDERS ASAP PRIOR TO SEEING BOB ON 11/17/2022. THE PATIENT IS REQUESTING A CALL BACK TO DISCUSS

## 2022-11-17 ENCOUNTER — OFFICE VISIT (OUTPATIENT)
Dept: FAMILY MEDICINE CLINIC | Facility: CLINIC | Age: 72
End: 2022-11-17

## 2022-11-17 VITALS
BODY MASS INDEX: 21.18 KG/M2 | DIASTOLIC BLOOD PRESSURE: 62 MMHG | HEART RATE: 76 BPM | WEIGHT: 143 LBS | HEIGHT: 69 IN | SYSTOLIC BLOOD PRESSURE: 114 MMHG | TEMPERATURE: 97 F | OXYGEN SATURATION: 99 %

## 2022-11-17 DIAGNOSIS — F43.21 SITUATIONAL DEPRESSION: Primary | ICD-10-CM

## 2022-11-17 PROCEDURE — 99213 OFFICE O/P EST LOW 20 MIN: CPT | Performed by: PHYSICIAN ASSISTANT

## 2022-11-17 RX ORDER — SERTRALINE HYDROCHLORIDE 100 MG/1
100 TABLET, FILM COATED ORAL DAILY
Qty: 90 TABLET | Refills: 3 | Status: SHIPPED | OUTPATIENT
Start: 2022-11-17
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
## 2022-12-02 ENCOUNTER — APPOINTMENT (OUTPATIENT)
Dept: PREADMISSION TESTING | Facility: HOSPITAL | Age: 72
End: 2022-12-02

## 2022-12-27 ENCOUNTER — TELEPHONE (OUTPATIENT)
Dept: ORTHOPEDIC SURGERY | Facility: CLINIC | Age: 72
End: 2022-12-27

## 2022-12-27 NOTE — TELEPHONE ENCOUNTER
Caller: LAURIE VEE    Relationship to patient: SELF    Best call back number: [phone]    Chief complaint: BILATERAL KNEE PAIN    Type of visit: ORTHOVISC INJECTION    Requested date: ASAP     If rescheduling, when is the original appointment: N/A     Additional notes: LAST SERIES WAS IN AUG 2021

## 2022-12-29 DIAGNOSIS — M19.012 PRIMARY OSTEOARTHRITIS OF LEFT SHOULDER: ICD-10-CM

## 2023-01-03 RX ORDER — NAPROXEN 500 MG/1
TABLET ORAL
Qty: 180 TABLET | Refills: 3 | Status: SHIPPED | OUTPATIENT
Start: 2023-01-03

## 2023-01-04 NOTE — TELEPHONE ENCOUNTER
It does not look like we have ever seen her for her left knee.  She should come in for evaluation of the left knee prior to having Visco ordered for it.

## 2023-01-05 ENCOUNTER — TELEPHONE (OUTPATIENT)
Dept: FAMILY MEDICINE CLINIC | Facility: CLINIC | Age: 73
End: 2023-01-05

## 2023-01-05 NOTE — TELEPHONE ENCOUNTER
Caller: Marci Snyder    Relationship to patient: Self    Best call back number: [phone]    Chief complaint: MEDICATION FOLLOW UP    Type of visit: OFFICE VISIT    Requested date: 1/10 OR 1/11     If rescheduling, when is the original appointment: 1/12/23     Additional notes:PLEASE ADVISE PATIENT IF ABLE TO RESCHEDULE FOR EITHER 1/10 OR 1/11

## 2023-01-17 ENCOUNTER — OFFICE VISIT (OUTPATIENT)
Dept: ORTHOPEDIC SURGERY | Facility: CLINIC | Age: 73
End: 2023-01-17
Payer: MEDICARE

## 2023-01-17 VITALS
SYSTOLIC BLOOD PRESSURE: 124 MMHG | BODY MASS INDEX: 20.14 KG/M2 | HEIGHT: 69 IN | WEIGHT: 136 LBS | DIASTOLIC BLOOD PRESSURE: 74 MMHG

## 2023-01-17 DIAGNOSIS — M17.0 PRIMARY OSTEOARTHRITIS OF BOTH KNEES: Primary | ICD-10-CM

## 2023-01-17 DIAGNOSIS — M25.561 PAIN IN BOTH KNEES, UNSPECIFIED CHRONICITY: ICD-10-CM

## 2023-01-17 DIAGNOSIS — M25.562 PAIN IN BOTH KNEES, UNSPECIFIED CHRONICITY: ICD-10-CM

## 2023-01-17 PROCEDURE — 99214 OFFICE O/P EST MOD 30 MIN: CPT | Performed by: ORTHOPAEDIC SURGERY

## 2023-01-17 PROCEDURE — 20610 DRAIN/INJ JOINT/BURSA W/O US: CPT | Performed by: ORTHOPAEDIC SURGERY

## 2023-01-17 NOTE — PROGRESS NOTES
Orthopaedic Clinic Note: Knee Established Patient    Chief Complaint   Patient presents with   • Right Knee - Follow-up     1.5 year follow up; Primary osteoarthritis of right knee    • Left Knee - Pain, Initial Evaluation        HPI    It has been 1.5 years since patient's last visit.  She returns to clinic for follow-up of right knee arthritis as well as new complaint of left knee pain.  She completed viscosupplementation series in the right knee about a year and a half ago.  The series provided excellent relief that lasted for approximately 15 to 16 months.  Over the course of the past 2 to 3 weeks, her right knee pain has returned.  In addition to this she is complaining of pain in the left knee that she rates a 9/10 on the pain scale.  She is having swelling and stiffness in the knees and difficulty with weightbearing activities.  She is requesting intervention for bilateral knee pain.    Past Medical History:   Diagnosis Date   • Allergic rhinitis    • Benign paroxysmal positional vertigo    • Bursitis/tendonitis, shoulder    • Chronic nonsuppurative otitis media    • Depressive disorder    • Fatigue    • Headache, cervicogenic    • Hypothyroidism    • Insomnia    • Myalgia    • Nail fungus    • Nervousness    • OA (osteoarthritis) of shoulder    • CHIQUITA (obstructive sleep apnea)     COMPLIANT WITH CPAP USE   • Osteoporosis    • Preventive measure    • Sciatica of right side    • TSH (thyroid-stimulating hormone deficiency)    • Vitamin D deficiency    • Wears glasses       Past Surgical History:   Procedure Laterality Date   • AMPUTATION DIGIT Right 07/13/2021    Procedure: AMPUTATE 3RD TOE AT PIP JOINT RIGHT;  Surgeon: Gwendolyn Del Rosario MD;  Location: [facility];  Service: Orthopedics;  Laterality: Right;   • CHOLECYSTECTOMY     • COLONOSCOPY     • FOOT SURGERY Right     pinched nerve   • FOOT SURGERY Left 01/17/2019    1st MPJ fusion - Dr. Gwendolyn Del Rosario ; [facility]    • NOSE SURGERY  1985     DEVIATED SEPTUM      Family History   Problem Relation Age of Onset   • Stroke Mother    • Cancer Father    • Breast cancer Sister 73   • Breast cancer Paternal Aunt         DX AGE UNKNOWN   • Breast cancer Paternal Aunt         DX AGE UNKNOWN   • Breast cancer Cousin         SEVERAL COUSINS - DX AGES UNKNOWN   • Ovarian cancer Neg Hx      Social History     Socioeconomic History   • Marital status:    Tobacco Use   • Smoking status: Never   • Smokeless tobacco: Never   Vaping Use   • Vaping Use: Never used   Substance and Sexual Activity   • Alcohol use: Yes     Comment: social   • Drug use: No   • Sexual activity: Yes      Current Outpatient Medications on File Prior to Visit   Medication Sig Dispense Refill   • acyclovir (Zovirax) 400 MG tablet Take 1 tablet by mouth 5 (Five) Times a Day. For fever blisters 25 tablet 5   • acyclovir (Zovirax) 5 % ointment Apply 1 application topically to the appropriate area as directed Every 3 (Three) Hours. For fever blisters 5 g 5   • Allergy 4 MG tablet Take 4 mg by mouth Daily. WAL-FINATE     • Apoaequorin (Prevagen Extra Strength) 20 MG capsule Take 1 tablet by mouth Daily. 30 capsule 11   • azelastine (OPTIVAR) 0.05 % ophthalmic solution azelastine 0.05 % eye drops     • buPROPion XL (WELLBUTRIN XL) 150 MG 24 hr tablet Take 1 tablet by mouth 2 (Two) Times a Day. 180 tablet 3   • cetirizine (ZyrTEC) 10 MG tablet Take 10 mg by mouth daily.     • Cholecalciferol (Vitamin D3) 1.25 MG (51776 UT) tablet Take 1 tablet by mouth Daily. STOPPED TAKING FOR UPCOMING PROCEDURE     • DHEA 50 MG capsule Take 1 capsule by mouth Daily.     • Diclofenac Sodium (VOLTAREN) 1 % gel gel Apply 2 g topically to the appropriate area as directed 2 (Two) Times a Day. For neck pain 100 g 11   • fluocinonide (LIDEX) 0.05 % gel Apply 1 application topically to the appropriate area as directed 2 (Two) Times a Day. To sun rash on arms 60 g 2   • fluocinonide-emollient (LIDEX-E) 0.05 % cream Apply   topically to the appropriate area as directed 2 (Two) Times a Day. 30 g 1   • hydrOXYzine (ATARAX) 25 MG tablet Take 1 tablet by mouth every night at bedtime.     • levocetirizine (XYZAL) 5 MG tablet Take 5 mg by mouth Daily.  0   • levothyroxine (SYNTHROID, LEVOTHROID) 25 MCG tablet TAKE 1 TABLET BY MOUTH EVERY MORNING ON AN EMPTY STOMACH AND WAIT 30 MINUTES BEFORE EATING/DRINKING/TAKING OTHER MEDS 90 tablet 3   • loratadine (CLARITIN) 10 MG tablet Take 1 tablet by mouth Daily. 90 tablet 3   • Melatonin 10 MG capsule Take 5 capsules by mouth Every Night.     • methscopolamine (PAMINE) 2.5 MG tablet Take 1 tablet by mouth 2 (Two) Times a Day As Needed (congestion). (Patient taking differently: Take 2.5 mg by mouth 4 (Four) Times a Day.) 120 tablet 5   • montelukast (SINGULAIR) 10 MG tablet Take 1 tablet by mouth Every Night. (Patient taking differently: Take 10 mg by mouth Daily.) 90 tablet 3   • mupirocin (BACTROBAN) 2 % ointment Apply 1 application topically to the appropriate area as directed Daily. 66 g 5   • naproxen (NAPROSYN) 500 MG tablet TAKE 1 TABLET BY MOUTH TWICE DAILY WITH MEALS 180 tablet 3   • pilocarpine (SALAGEN) 5 MG tablet TAKE 3 TABLETS BY MOUTH DAILY. INCREASE TO 4 AS NEEDED     • PreviDent 5000 Booster Plus 1.1 % paste See Admin Instructions.     • sertraline (Zoloft) 100 MG tablet Take 1 tablet by mouth Daily. New dose for anxiety 90 tablet 3   • traMADol (ULTRAM) 50 MG tablet Take 2 tablets by mouth Every 6 (Six) Hours As Needed for Severe Pain. 240 tablet 5   • triamcinolone (KENALOG) 0.1 % lotion APPLY A THIN FILM TO THE AFFECTED AREA TWICE DAILY AS DIRECTED       Current Facility-Administered Medications on File Prior to Visit   Medication Dose Route Frequency Provider Last Rate Last Admin   • cyanocobalamin injection 1,000 mcg  1,000 mcg Intramuscular Q28 Days Saranya Terrell PA-C   1,000 mcg at 10/08/19 1648   • cyanocobalamin injection 1,000 mcg  1,000 mcg Intramuscular Q28 Days  "Saranya Terrell PA-C   1,000 mcg at 01/31/20 1454   • cyanocobalamin injection 1,000 mcg  1,000 mcg Intramuscular Q28 Days Saranya Terrell PA-C   1,000 mcg at 11/04/21 1453      Allergies   Allergen Reactions   • Penicillins Anaphylaxis     Prev. Tolerated Keflex        Review of Systems   Constitutional: Negative.    HENT: Negative.    Eyes: Negative.    Respiratory: Negative.    Cardiovascular: Negative.    Gastrointestinal: Negative.    Endocrine: Negative.    Genitourinary: Negative.    Musculoskeletal: Positive for arthralgias.   Skin: Negative.    Allergic/Immunologic: Negative.    Neurological: Negative.    Hematological: Negative.    Psychiatric/Behavioral: Negative.         The patient's Review of Systems was personally reviewed and confirmed as accurate.    Physical Exam  Blood pressure 124/74, height 174 cm (68.5\"), weight 61.7 kg (136 lb), not currently breastfeeding.    Body mass index is 20.38 kg/m².    GENERAL APPEARANCE: awake, alert, oriented, in no acute distress and well developed, well nourished  LUNGS:  breathing nonlabored  EXTREMITIES: no clubbing, cyanosis  PERIPHERAL PULSES: palpable dorsalis pedis and posterior tibial pulses bilaterally.    GAIT:  Antalgic        ----------  Bilateral Knee Exam:  ----------  ALIGNMENT: mild varus, correctible to neutral  ----------  RANGE OF MOTION:  Normal (0-120 degrees) with no extensor lag or flexion contracture  LIGAMENTOUS STABILITY:   stable to varus and valgus stress at terminal extension and 30 degrees; retensioning of the MCL is appreciated with valgus stress at 30 degrees consistent with medial compartment degeneration  ----------  STRENGTH:  KNEE FLEXION 5/5  KNEE EXTENSION  5/5  ANKLE DORSIFLEXION  5/5  ANKLE PLANTARFLEXION  5/5  ----------  PAIN WITH PALPATION:medial joint line and anterior knee  KNEE EFFUSION: yes, trace effusion  PAIN WITH KNEE ROM: yes  PATELLAR CREPITUS:  yes, painful and symptomatic  ----------  SENSATION TO LIGHT " TOUCH:  DEEP PERONEAL/SUPERFICIAL PERONEAL/SURAL/SAPHENOUS/TIBIAL:    intact  ----------  EDEMA:  no  ERYTHEMA:    no  WOUNDS/INCISIONS:   no  _____________________________________________________________________  _____________________________________________________________________    RADIOGRAPHIC FINDINGS:   Indication: Bilateral knee pain    Comparison: Todays xrays were compared to previous xrays from 7/2/2021    Knee films: Mild to moderate tricompartmental osteoarthritis with slight varus alignment.  Chondrocalcinosis identified in the medial compartments of bilateral knees.  Small periarticular osteophytes visualized in all compartments.  No significant changes compared to prior radiographs.    Assessment/Plan:   Diagnosis Plan   1. Primary osteoarthritis of both knees        2. Pain in both knees, unspecified chronicity  XR Knee 4+ View Bilateral        Patient suffering from arthritic flareup of the bilateral knees.  I discussed treatment options with her.  She received excellent benefit from prior viscosupplementation series.  We will obtain insurance preauthorization for bilateral knee viscosupplementation series given her success in the past.  We are able to achieve authorization today.  We will initiate the bilateral knee Visco series today.    Procedure Note:  I discussed with the patient the potential benefits of performing a therapeutic injections of the bilateral knees as well as potential risks including but not limited to infection, swelling, pain, bleeding, bruising, nerve/vessel damage, pseudoseptic reaction, and worsening joint pain. After informed consent and verifying correct patient, procedure site, and type of procedure, the areas were prepped with alcohol, ethyl chloride was used to numb the skin. Via the superolateral approach, the viscosupplementation syringe contents were injected into each of the bilateral knees. The patient tolerated the procedures well. There were no complications. A  sterile dressing was placed over each injection site.    Follow up 1 week.      Abiodun Baird MD  01/17/23  10:11 EST

## 2023-01-17 NOTE — PROGRESS NOTES
Procedure   - Large Joint Arthrocentesis: bilateral knee on 1/17/2023 9:59 AM  Indications: pain  Details: (23g) needle, anterolateral approach  Medications (Right): 30 mg Hyaluronan 30 MG/2ML  Medications (Left): 30 mg Hyaluronan 30 MG/2ML  Outcome: tolerated well, no immediate complications  Procedure, treatment alternatives, risks and benefits explained, specific risks discussed. Consent was given by the patient. Immediately prior to procedure a time out was called to verify the correct patient, procedure, equipment, support staff and site/side marked as required. Patient was prepped and draped in the usual sterile fashion.

## 2023-01-19 ENCOUNTER — OFFICE VISIT (OUTPATIENT)
Dept: FAMILY MEDICINE CLINIC | Facility: CLINIC | Age: 73
End: 2023-01-19
Payer: MEDICARE

## 2023-01-19 VITALS
BODY MASS INDEX: 21.07 KG/M2 | OXYGEN SATURATION: 99 % | TEMPERATURE: 97.2 F | WEIGHT: 139 LBS | SYSTOLIC BLOOD PRESSURE: 142 MMHG | HEART RATE: 73 BPM | DIASTOLIC BLOOD PRESSURE: 82 MMHG | HEIGHT: 68 IN

## 2023-01-19 DIAGNOSIS — F43.21 SITUATIONAL DEPRESSION: ICD-10-CM

## 2023-01-19 DIAGNOSIS — M47.812 SPONDYLOSIS OF CERVICAL REGION WITHOUT MYELOPATHY OR RADICULOPATHY: ICD-10-CM

## 2023-01-19 DIAGNOSIS — M15.9 PRIMARY OSTEOARTHRITIS INVOLVING MULTIPLE JOINTS: ICD-10-CM

## 2023-01-19 DIAGNOSIS — M47.816 LUMBAR SPONDYLOSIS: ICD-10-CM

## 2023-01-19 DIAGNOSIS — F32.A DEPRESSIVE DISORDER: ICD-10-CM

## 2023-01-19 DIAGNOSIS — R25.1 TREMOR: ICD-10-CM

## 2023-01-19 DIAGNOSIS — E53.8 B12 DEFICIENCY: Primary | ICD-10-CM

## 2023-01-19 PROCEDURE — 99213 OFFICE O/P EST LOW 20 MIN: CPT | Performed by: PHYSICIAN ASSISTANT

## 2023-01-19 PROCEDURE — 96372 THER/PROPH/DIAG INJ SC/IM: CPT | Performed by: PHYSICIAN ASSISTANT

## 2023-01-19 RX ORDER — BUPROPION HYDROCHLORIDE 150 MG/1
150 TABLET ORAL 2 TIMES DAILY
Qty: 180 TABLET | Refills: 3 | Status: SHIPPED | OUTPATIENT
Start: 2023-01-19

## 2023-01-19 RX ORDER — TRAMADOL HYDROCHLORIDE 50 MG/1
100 TABLET ORAL EVERY 6 HOURS PRN
Qty: 240 TABLET | Refills: 5 | Status: SHIPPED | OUTPATIENT
Start: 2023-01-19

## 2023-01-19 RX ORDER — CYANOCOBALAMIN 1000 UG/ML
1000 INJECTION, SOLUTION INTRAMUSCULAR; SUBCUTANEOUS ONCE
Status: COMPLETED | OUTPATIENT
Start: 2023-01-19 | End: 2023-01-19

## 2023-01-19 RX ADMIN — CYANOCOBALAMIN 1000 MCG: 1000 INJECTION, SOLUTION INTRAMUSCULAR; SUBCUTANEOUS at 15:00

## 2023-01-19 NOTE — PROGRESS NOTES
Subjective   Marci Snyder is a 72 y.o. female  Arthritis (Follow up on arthritic pain, refill on Tramadol) and Depression (Refill on wellbutrin for depression)      History of Present Illness    The patient presents today to discuss depression and arthritis.    The patient reports that she is doing well on the Wellbutrin and Zoloft.    The patient inquires about how difficult it is to get a parkinson's test scheduled. She reports that her mother had Parkinson's disease. The patient states that no one in her family did not know she had it because she did not realize she had it. She reports that it got to the point that she could not feed herself. The patient states that she has shakes sometimes.    The following portions of the patient's history were reviewed and updated as appropriate: allergies, current medications, past social history and problem list    Review of Systems   Constitutional: Negative.    HENT: Negative for sore throat, trouble swallowing and voice change.    Respiratory: Negative.  Negative for shortness of breath.    Gastrointestinal: Negative.    Genitourinary: Negative.    Musculoskeletal: Positive for back pain, neck pain and neck stiffness. Negative for arthralgias, gait problem and myalgias.   Skin: Negative for rash and wound.   Neurological: Negative for dizziness, tremors, weakness and numbness.   Hematological: Negative for adenopathy.   Psychiatric/Behavioral: Positive for dysphoric mood ( stable on medication).       Objective     Vitals:    01/19/23 1446   BP: 142/82   Pulse: 73   Temp: 97.2 °F (36.2 °C)   SpO2: 99%       Physical Exam  Vitals and nursing note reviewed.   Constitutional:       General: She is not in acute distress.     Appearance: Normal appearance. She is well-developed and normal weight. She is not ill-appearing, toxic-appearing or diaphoretic.   HENT:      Head: Normocephalic and atraumatic.   Neck:      Thyroid: No thyroid mass or thyromegaly.    Cardiovascular:      Rate and Rhythm: Normal rate and regular rhythm.      Heart sounds: Normal heart sounds.   Pulmonary:      Effort: Pulmonary effort is normal. No respiratory distress.   Skin:     General: Skin is warm and dry.   Neurological:      Mental Status: She is alert and oriented to person, place, and time.   Psychiatric:         Attention and Perception: Attention and perception normal. She is attentive.         Mood and Affect: Mood normal. Mood is not anxious or depressed. Affect is not angry or inappropriate.         Speech: Speech normal.         Behavior: Behavior normal.         Thought Content: Thought content normal.         Cognition and Memory: Cognition normal.         Judgment: Judgment normal.         Assessment & Plan     Diagnoses and all orders for this visit:    1. B12 deficiency (Primary)  -     cyanocobalamin injection 1,000 mcg    2. Situational depression    3. Primary osteoarthritis involving multiple joints  -     traMADol (ULTRAM) 50 MG tablet; Take 2 tablets by mouth Every 6 (Six) Hours As Needed for Severe Pain.  Dispense: 240 tablet; Refill: 5    4. Spondylosis of cervical region without myelopathy or radiculopathy  -     traMADol (ULTRAM) 50 MG tablet; Take 2 tablets by mouth Every 6 (Six) Hours As Needed for Severe Pain.  Dispense: 240 tablet; Refill: 5    5. Lumbar spondylosis  -     traMADol (ULTRAM) 50 MG tablet; Take 2 tablets by mouth Every 6 (Six) Hours As Needed for Severe Pain.  Dispense: 240 tablet; Refill: 5    6. Tremor  -     Ambulatory Referral to Neurology    7. Depressive disorder  -     buPROPion XL (WELLBUTRIN XL) 150 MG 24 hr tablet; Take 1 tablet by mouth 2 (Two) Times a Day.  Dispense: 180 tablet; Refill: 3     As part of this patient's treatment plan, patient will be prescribed controlled substances. The patient has been made aware of appropriate use of such medications, including potential risk of somnolence, limited ability to drive and /or work  safely, and potential for dependence or overdose. It has also been made clear that these medications are for use by this patient only, without concomitant use of alcohol or other substances unless prescribed.Controlled substance status of medication discussed with patient, discussed risks of medication including abuse potential and diversion potential and need to follow up for reevaluation appointment in order to receive further refills.    Part of this note may be an electronic transcription/translation of spoken language to printed text using the Dragon Dictation System.      The patient will be referred to neurology and will continue Wellbutrin and Zoloft as prescribed.    Transcribed from ambient dictation for Saranya Terrell PA-C by Mari Bolanos.  01/19/23   16:21 EST    Patient or patient representative verbalized consent to the visit recording.  I have personally performed the services described in this document as transcribed by the above individual, and it is both accurate and complete.  Saranya Terrell PA-C  1/20/2023  12:08 EST

## 2023-01-20 ENCOUNTER — TELEPHONE (OUTPATIENT)
Dept: FAMILY MEDICINE CLINIC | Facility: CLINIC | Age: 73
End: 2023-01-20
Payer: MEDICARE

## 2023-01-20 NOTE — TELEPHONE ENCOUNTER
Provider: EDD HEREDIA MD    Caller: LAURIE KOEHLERE    Phone Number: [phone]    Reason for Call:PATIENT WOULD LIKE TO LET BOB ALDRICH KNOW THAT SHE WILL BE STOPPING BY THE OFFICE ON 1/26/23 AROUND NOON. SHE WILL COMPLETE ALL NEEDED FAMILY INFORMATION ON FORMS AND  HER FOLDER TO TAKE BACK . IF NEED BE KEEP MEDICAL RECORDS.

## 2023-01-23 NOTE — TELEPHONE ENCOUNTER
Can you please call Marci and ask her a little bit about this form I am not clear if Margarita is supposed to be the  #1 or the claimant #10 and I do not think we have any of these numbers they need

## 2023-01-26 ENCOUNTER — CLINICAL SUPPORT (OUTPATIENT)
Dept: ORTHOPEDIC SURGERY | Facility: CLINIC | Age: 73
End: 2023-01-26
Payer: MEDICARE

## 2023-01-26 DIAGNOSIS — M17.0 PRIMARY OSTEOARTHRITIS OF BOTH KNEES: Primary | ICD-10-CM

## 2023-01-26 PROCEDURE — 20610 DRAIN/INJ JOINT/BURSA W/O US: CPT | Performed by: ORTHOPAEDIC SURGERY

## 2023-01-26 NOTE — PROGRESS NOTES
Patient returns for second set of bilateral knee Orthovisc injections.  Denies complications from the first set of injections.  Overall her pain has improved.    Procedure Note:  I discussed with the patient the potential benefits of performing a therapeutic injections of the bilateral knees as well as potential risks including but not limited to infection, swelling, pain, bleeding, bruising, nerve/vessel damage, pseudoseptic reaction, and worsening joint pain. After informed consent and verifying correct patient, procedure site, and type of procedure, the areas were prepped with alcohol, ethyl chloride was used to numb the skin. Via the superolateral approach, the viscosupplementation syringe contents were injected into each of the bilateral knees. The patient tolerated the procedures well. There were no complications. A sterile dressing was placed over each injection site.    Follow up 1 week.

## 2023-01-26 NOTE — PROGRESS NOTES
Procedure   - Large Joint Arthrocentesis: bilateral knee on 1/26/2023 10:59 AM  Indications: pain  Details: (23 g) needle, superolateral approach  Medications (Right): 30 mg Hyaluronan 30 MG/2ML  Medications (Left): 30 mg Hyaluronan 30 MG/2ML  Outcome: tolerated well, no immediate complications  Procedure, treatment alternatives, risks and benefits explained, specific risks discussed. Consent was given by the patient. Immediately prior to procedure a time out was called to verify the correct patient, procedure, equipment, support staff and site/side marked as required. Patient was prepped and draped in the usual sterile fashion.

## 2023-02-02 ENCOUNTER — CLINICAL SUPPORT (OUTPATIENT)
Dept: ORTHOPEDIC SURGERY | Facility: CLINIC | Age: 73
End: 2023-02-02
Payer: MEDICARE

## 2023-02-02 DIAGNOSIS — M17.0 PRIMARY OSTEOARTHRITIS OF BOTH KNEES: Primary | ICD-10-CM

## 2023-02-02 PROCEDURE — 20610 DRAIN/INJ JOINT/BURSA W/O US: CPT | Performed by: ORTHOPAEDIC SURGERY

## 2023-02-02 RX ORDER — VALACYCLOVIR HYDROCHLORIDE 1 G/1
TABLET, FILM COATED ORAL
COMMUNITY
Start: 2023-01-16

## 2023-02-02 NOTE — PROGRESS NOTES
Patient returns for third set of bilateral knee Orthovisc injections.  Denies complications from prior 2 injections.  Overall her pain has improved.    Procedure Note:  I discussed with the patient the potential benefits of performing a therapeutic injections of the bilateral knees as well as potential risks including but not limited to infection, swelling, pain, bleeding, bruising, nerve/vessel damage, pseudoseptic reaction, and worsening joint pain. After informed consent and verifying correct patient, procedure site, and type of procedure, the areas were prepped with alcohol, ethyl chloride was used to numb the skin. Via the superolateral approach, the viscosupplementation syringe contents were injected into each of the bilateral knees. The patient tolerated the procedures well. There were no complications. A sterile dressing was placed over each injection site.    Follow up as needed.

## 2023-02-02 NOTE — PROGRESS NOTES
Procedure   - Large Joint Arthrocentesis: bilateral knee on 2/2/2023 10:51 AM  Indications: pain  Details: (23g) needle, anterolateral approach  Medications (Right): 30 mg Hyaluronan 30 MG/2ML  Medications (Left): 30 mg Hyaluronan 30 MG/2ML  Outcome: tolerated well, no immediate complications  Procedure, treatment alternatives, risks and benefits explained, specific risks discussed. Immediately prior to procedure a time out was called to verify the correct patient, procedure, equipment, support staff and site/side marked as required. Patient was prepped and draped in the usual sterile fashion.

## 2023-04-18 ENCOUNTER — OFFICE VISIT (OUTPATIENT)
Dept: ORTHOPEDIC SURGERY | Facility: CLINIC | Age: 73
End: 2023-04-18
Payer: MEDICARE

## 2023-04-18 VITALS
HEIGHT: 68 IN | BODY MASS INDEX: 20.61 KG/M2 | WEIGHT: 136 LBS | SYSTOLIC BLOOD PRESSURE: 90 MMHG | DIASTOLIC BLOOD PRESSURE: 50 MMHG

## 2023-04-18 DIAGNOSIS — M17.12 PRIMARY OSTEOARTHRITIS OF LEFT KNEE: Primary | ICD-10-CM

## 2023-04-18 DIAGNOSIS — M70.50 PES ANSERINE BURSITIS: ICD-10-CM

## 2023-04-18 RX ORDER — LIDOCAINE HYDROCHLORIDE 10 MG/ML
3 INJECTION, SOLUTION EPIDURAL; INFILTRATION; INTRACAUDAL; PERINEURAL
Status: COMPLETED | OUTPATIENT
Start: 2023-04-18 | End: 2023-04-18

## 2023-04-18 RX ORDER — TRIAMCINOLONE ACETONIDE 40 MG/ML
80 INJECTION, SUSPENSION INTRA-ARTICULAR; INTRAMUSCULAR
Status: COMPLETED | OUTPATIENT
Start: 2023-04-18 | End: 2023-04-18

## 2023-04-18 RX ORDER — BUPIVACAINE HYDROCHLORIDE 2.5 MG/ML
3 INJECTION, SOLUTION EPIDURAL; INFILTRATION; INTRACAUDAL
Status: COMPLETED | OUTPATIENT
Start: 2023-04-18 | End: 2023-04-18

## 2023-04-18 RX ADMIN — BUPIVACAINE HYDROCHLORIDE 3 ML: 2.5 INJECTION, SOLUTION EPIDURAL; INFILTRATION; INTRACAUDAL at 15:28

## 2023-04-18 RX ADMIN — LIDOCAINE HYDROCHLORIDE 3 ML: 10 INJECTION, SOLUTION EPIDURAL; INFILTRATION; INTRACAUDAL; PERINEURAL at 15:28

## 2023-04-18 RX ADMIN — TRIAMCINOLONE ACETONIDE 80 MG: 40 INJECTION, SUSPENSION INTRA-ARTICULAR; INTRAMUSCULAR at 15:28

## 2023-04-18 NOTE — PROGRESS NOTES
Procedure   - Large Joint Arthrocentesis: L knee on 4/18/2023 3:28 PM  Indications: pain  Details: 22 G needle, anterolateral approach  Medications: 3 mL lidocaine PF 1% 1 %; 3 mL bupivacaine (PF) 0.25 %; 80 mg triamcinolone acetonide 40 MG/ML  Outcome: tolerated well, no immediate complications  Procedure, treatment alternatives, risks and benefits explained, specific risks discussed. Consent was given by the patient. Immediately prior to procedure a time out was called to verify the correct patient, procedure, equipment, support staff and site/side marked as required. Patient was prepped and draped in the usual sterile fashion.

## 2023-04-18 NOTE — PROGRESS NOTES
Orthopaedic Clinic Note: Knee Established Patient    Chief Complaint   Patient presents with   • Follow-up     2.5 month recheck  - Primary osteoarthritis of both knees          HPI    It has been 3  month(s) since Ms. Snyder's last visit. She returns to clinic today for follow-up bilateral knee osteoarthritis.  She completed viscosupplementation series in both knees about 2-1/2 months ago.  The injections worked well for the right knee and continue to provide good relief.  Left knee however is continuing to have pain that she rates a 10/10 on the pain scale.  She localizes it globally throughout the knee, especially the pes bursa region.  It is worse with any walking weightbearing activities.  She denies fevers chills or constitutional symptoms.    Past Medical History:   Diagnosis Date   • Allergic rhinitis    • Benign paroxysmal positional vertigo    • Bursitis/tendonitis, shoulder    • Chronic nonsuppurative otitis media    • Depressive disorder    • Fatigue    • Headache, cervicogenic    • Hypothyroidism    • Insomnia    • Myalgia    • Nail fungus    • Nervousness    • OA (osteoarthritis) of shoulder    • CHIQUITA (obstructive sleep apnea)     COMPLIANT WITH CPAP USE   • Osteoporosis    • Preventive measure    • Sciatica of right side    • TSH (thyroid-stimulating hormone deficiency)    • Vitamin D deficiency    • Wears glasses       Past Surgical History:   Procedure Laterality Date   • AMPUTATION DIGIT Right 07/13/2021    Procedure: AMPUTATE 3RD TOE AT PIP JOINT RIGHT;  Surgeon: Gwenodlyn Del Rosario MD;  Location: [facility];  Service: Orthopedics;  Laterality: Right;   • CHOLECYSTECTOMY     • COLONOSCOPY     • FOOT SURGERY Right     pinched nerve   • FOOT SURGERY Left 01/17/2019    1st MPJ fusion - Dr. Gwendolyn Del Rosario ; [facility]    • NOSE SURGERY  1985    DEVIATED SEPTUM      Family History   Problem Relation Age of Onset   • Stroke Mother    • Cancer Father    • Breast cancer Sister 73   • Breast  cancer Paternal Aunt         DX AGE UNKNOWN   • Breast cancer Paternal Aunt         DX AGE UNKNOWN   • Breast cancer Cousin         SEVERAL COUSINS - DX AGES UNKNOWN   • Ovarian cancer Neg Hx      Social History     Socioeconomic History   • Marital status:    Tobacco Use   • Smoking status: Never   • Smokeless tobacco: Never   Vaping Use   • Vaping Use: Never used   Substance and Sexual Activity   • Alcohol use: Yes     Comment: social   • Drug use: No   • Sexual activity: Yes      Current Outpatient Medications on File Prior to Visit   Medication Sig Dispense Refill   • acyclovir (Zovirax) 400 MG tablet Take 1 tablet by mouth 5 (Five) Times a Day. For fever blisters 25 tablet 5   • acyclovir (Zovirax) 5 % ointment Apply 1 application topically to the appropriate area as directed Every 3 (Three) Hours. For fever blisters 5 g 5   • Allergy 4 MG tablet Take 1 tablet by mouth Daily. WAL-FINATE     • Apoaequorin (Prevagen Extra Strength) 20 MG capsule Take 1 tablet by mouth Daily. 30 capsule 11   • azelastine (OPTIVAR) 0.05 % ophthalmic solution azelastine 0.05 % eye drops     • buPROPion XL (WELLBUTRIN XL) 150 MG 24 hr tablet Take 1 tablet by mouth 2 (Two) Times a Day. 180 tablet 3   • cetirizine (ZyrTEC) 10 MG tablet Take 1 tablet by mouth Daily.     • Cholecalciferol (Vitamin D3) 1.25 MG (31781 UT) tablet Take 1 tablet by mouth Daily. STOPPED TAKING FOR UPCOMING PROCEDURE     • DHEA 50 MG capsule Take 1 capsule by mouth Daily.     • Diclofenac Sodium (VOLTAREN) 1 % gel gel Apply 2 g topically to the appropriate area as directed 2 (Two) Times a Day. For neck pain 100 g 11   • fluocinonide (LIDEX) 0.05 % gel Apply 1 application topically to the appropriate area as directed 2 (Two) Times a Day. To sun rash on arms 60 g 2   • fluocinonide-emollient (LIDEX-E) 0.05 % cream Apply  topically to the appropriate area as directed 2 (Two) Times a Day. 30 g 1   • hydrOXYzine (ATARAX) 25 MG tablet Take 1 tablet by mouth  every night at bedtime.     • levocetirizine (XYZAL) 5 MG tablet Take 1 tablet by mouth Daily.  0   • levothyroxine (SYNTHROID, LEVOTHROID) 25 MCG tablet TAKE 1 TABLET BY MOUTH EVERY MORNING ON AN EMPTY STOMACH AND WAIT 30 MINUTES BEFORE EATING/DRINKING/TAKING OTHER MEDS 90 tablet 3   • loratadine (CLARITIN) 10 MG tablet Take 1 tablet by mouth Daily. 90 tablet 3   • Melatonin 10 MG capsule Take 5 capsules by mouth Every Night.     • methscopolamine (PAMINE) 2.5 MG tablet Take 1 tablet by mouth 2 (Two) Times a Day As Needed (congestion). (Patient taking differently: Take 1 tablet by mouth 4 (Four) Times a Day.) 120 tablet 5   • montelukast (SINGULAIR) 10 MG tablet Take 1 tablet by mouth Every Night. (Patient taking differently: Take 1 tablet by mouth Daily.) 90 tablet 3   • mupirocin (BACTROBAN) 2 % ointment Apply 1 application topically to the appropriate area as directed Daily. 66 g 5   • naproxen (NAPROSYN) 500 MG tablet TAKE 1 TABLET BY MOUTH TWICE DAILY WITH MEALS 180 tablet 3   • pilocarpine (SALAGEN) 5 MG tablet TAKE 3 TABLETS BY MOUTH DAILY. INCREASE TO 4 AS NEEDED     • PreviDent 5000 Booster Plus 1.1 % paste See Admin Instructions.     • sertraline (Zoloft) 100 MG tablet Take 1 tablet by mouth Daily. New dose for anxiety 90 tablet 3   • traMADol (ULTRAM) 50 MG tablet Take 2 tablets by mouth Every 6 (Six) Hours As Needed for Severe Pain. 240 tablet 5   • triamcinolone (KENALOG) 0.1 % lotion APPLY A THIN FILM TO THE AFFECTED AREA TWICE DAILY AS DIRECTED     • valACYclovir (VALTREX) 1000 MG tablet TAKE 2 TABLETS BY MOUTH WITH FIRST SIGN OF ONSET AND 2 TABLETS BY MOUTH 12 HOURS LATER.       Current Facility-Administered Medications on File Prior to Visit   Medication Dose Route Frequency Provider Last Rate Last Admin   • cyanocobalamin injection 1,000 mcg  1,000 mcg Intramuscular Q28 Days Saranya Terrell PA-C   1,000 mcg at 10/08/19 1648   • cyanocobalamin injection 1,000 mcg  1,000 mcg Intramuscular Q28  "Days Saranya Terrell PA-C   1,000 mcg at 01/31/20 1454   • cyanocobalamin injection 1,000 mcg  1,000 mcg Intramuscular Q28 Days Saranya Terrell PA-C   1,000 mcg at 11/04/21 1453      Allergies   Allergen Reactions   • Penicillins Anaphylaxis     Prev. Tolerated Keflex        Review of Systems   Constitutional: Negative.    HENT: Negative.    Eyes: Negative.    Respiratory: Negative.    Cardiovascular: Negative.    Gastrointestinal: Negative.    Endocrine: Negative.    Genitourinary: Negative.    Musculoskeletal: Positive for arthralgias.   Skin: Negative.    Allergic/Immunologic: Negative.    Neurological: Negative.    Hematological: Negative.    Psychiatric/Behavioral: Negative.         The patient's Review of Systems was personally reviewed and confirmed as accurate.    Physical Exam  Blood pressure 90/50, height 172.7 cm (67.99\"), weight 61.7 kg (136 lb), not currently breastfeeding.    Body mass index is 20.68 kg/m².    GENERAL APPEARANCE: awake, alert, oriented, in no acute distress and well developed, well nourished  LUNGS:  breathing nonlabored  EXTREMITIES: no clubbing, cyanosis  PERIPHERAL PULSES: palpable dorsalis pedis and posterior tibial pulses bilaterally.    GAIT:  Antalgic        ----------  Bilateral Knee Exam:  ----------  ALIGNMENT: mild varus, correctible to neutral  ----------  RANGE OF MOTION:  Normal (0-120 degrees) with no extensor lag or flexion contracture  LIGAMENTOUS STABILITY:   stable to varus and valgus stress at terminal extension and 30 degrees; retensioning of the MCL is appreciated with valgus stress at 30 degrees consistent with medial compartment degeneration  ----------  STRENGTH:  KNEE FLEXION 5/5  KNEE EXTENSION  5/5  ANKLE DORSIFLEXION  5/5  ANKLE PLANTARFLEXION  5/5  ----------  PAIN WITH PALPATION:medial joint line and pes bursa only on the left, nontender to palpation about the right knee  KNEE EFFUSION: yes, trace effusion left knee on the  PAIN WITH KNEE ROM: yes, " left knee only  PATELLAR CREPITUS:  yes, asymptomatic  ----------  SENSATION TO LIGHT TOUCH:  DEEP PERONEAL/SUPERFICIAL PERONEAL/SURAL/SAPHENOUS/TIBIAL:    intact  ----------  EDEMA:  no  ERYTHEMA:    no  WOUNDS/INCISIONS:   no    _____________________________________________________________________  _____________________________________________________________________    RADIOGRAPHIC FINDINGS:   No new imaging today    Assessment/Plan:   Diagnosis Plan   1. Primary osteoarthritis of left knee        2. Pes anserine bursitis          Patient is experiencing recurrent arthritic flareup of the left knee.  The patient has failed conservative treatment measures and is a candidate for joint arthroplasty.  I discussed the joint arthroplasty surgical process as well as the recovery and rehabilitation time frame.  The patient asked several questions regarding the joint arthroplasty surgery, which were answered accordingly.  Ultimately, the patient declines surgical intervention at this time and wishes to continue with conservative treatment measures.  Alternative conservative treatment measures were discussed including bracing, therapy, topical/oral anti-inflammatories, activity modification, and weight loss.  The patient considered these treatment options and wishes to proceed with corticosteroid injection(s) today.  Therefore we will proceed with corticosteroid injection(s) today in the left knee only.  She will follow-up in 3 months.    Procedure Note:  I discussed with the patient the potential benefits of performing a therapeutic injection of the left knee as well as potential risks including but not limited to infection, swelling, pain, bleeding, bruising, nerve/vessel damage, skin color changes, transient elevation in blood glucose levels, and fat atrophy. After informed consent and verifying correct patient, procedure site, and type of procedure, the area was prepped with alcohol, ethyl chloride was used to numb the  skin. Via the superior lateral approach, 3 cc of 1% lidocaine, 3 cc of 0.25% Marcaine and 2 cc of 40mg/ml of Kenalog were injected into the left knee. The patient tolerated the procedure well. There were no complications. A sterile dressing was placed over the injection site.        Abiodun Baird MD  04/18/23  15:30 EDT

## 2023-07-27 ENCOUNTER — TELEMEDICINE (OUTPATIENT)
Dept: FAMILY MEDICINE CLINIC | Facility: CLINIC | Age: 73
End: 2023-07-27
Payer: MEDICARE

## 2023-07-27 DIAGNOSIS — M15.9 PRIMARY OSTEOARTHRITIS INVOLVING MULTIPLE JOINTS: ICD-10-CM

## 2023-07-27 DIAGNOSIS — M47.812 SPONDYLOSIS OF CERVICAL REGION WITHOUT MYELOPATHY OR RADICULOPATHY: ICD-10-CM

## 2023-07-27 DIAGNOSIS — M47.816 LUMBAR SPONDYLOSIS: ICD-10-CM

## 2023-07-27 RX ORDER — TRAMADOL HYDROCHLORIDE 50 MG/1
100 TABLET ORAL EVERY 6 HOURS PRN
Qty: 240 TABLET | Refills: 5 | Status: SHIPPED | OUTPATIENT
Start: 2023-07-27

## 2023-07-27 NOTE — PROGRESS NOTES
Subjective   Marci Snyder is a 72 y.o. female  Med Refill      History of Present Illness  Patient presents and consents for telehealth/video visit examination.  Patient presents today for virtual visit from her home while I am at my office.  She is following up on chronic degenerative joint disease and degenerative disc disease due for refills of tramadol she takes 100 mg 4 times daily and has been stable on this dosage for over 10 years.  She continues to stay very active and denies any adverse effects from this medication.  By taking tramadol at this current dosage and allows her to control her arthritic pain where she is able to carry out ADLs and stay active exercising and cycling regularly.  The following portions of the patient's history were reviewed and updated as appropriate: allergies, current medications, past social history and problem list    Review of Systems   Constitutional:  Negative for activity change.   Musculoskeletal:  Positive for arthralgias, back pain and myalgias. Negative for gait problem and joint swelling.   Skin: Negative.    Neurological:  Negative for weakness and numbness.     Objective     There were no vitals filed for this visit.    Physical Exam  Constitutional:       General: She is not in acute distress.     Appearance: Normal appearance. She is well-developed. She is not ill-appearing, toxic-appearing or diaphoretic.   HENT:      Head: Normocephalic and atraumatic.   Eyes:      Conjunctiva/sclera: Conjunctivae normal.   Pulmonary:      Effort: Pulmonary effort is normal. No respiratory distress.   Skin:     General: Skin is dry.      Coloration: Skin is not pale.      Findings: No erythema or rash.   Neurological:      Mental Status: She is alert and oriented to person, place, and time.      Coordination: Coordination normal.   Psychiatric:         Attention and Perception: She is attentive.         Mood and Affect: Mood normal.         Speech: Speech normal.          Behavior: Behavior normal.         Thought Content: Thought content normal.         Judgment: Judgment normal.       Assessment & Plan     Diagnoses and all orders for this visit:    1. Lumbar spondylosis  -     traMADol (ULTRAM) 50 MG tablet; Take 2 tablets by mouth Every 6 (Six) Hours As Needed for Severe Pain.  Dispense: 240 tablet; Refill: 5    2. Spondylosis of cervical region without myelopathy or radiculopathy  -     traMADol (ULTRAM) 50 MG tablet; Take 2 tablets by mouth Every 6 (Six) Hours As Needed for Severe Pain.  Dispense: 240 tablet; Refill: 5    3. Primary osteoarthritis involving multiple joints  -     traMADol (ULTRAM) 50 MG tablet; Take 2 tablets by mouth Every 6 (Six) Hours As Needed for Severe Pain.  Dispense: 240 tablet; Refill: 5     As part of this patient's treatment plan, patient will be prescribed controlled substances. The patient has been made aware of appropriate use of such medications, including potential risk of somnolence, limited ability to drive and /or work safely, and potential for dependence or overdose. It has also been made clear that these medications are for use by this patient only, without concomitant use of alcohol or other substances unless prescribed.Controlled substance status of medication discussed with patient, discussed risks of medication including abuse potential and diversion potential and need to follow up for reevaluation appointment in order to receive further refills.    Part of this note may be an electronic transcription/translation of spoken language to printed text using the Dragon Dictation System.        I spent 15 minutes in patient care: Reviewing records prior to the visit, examining the patient, entering orders and documentation    Part of this note may be an electronic transcription/translation of spoken language to printed text using the Dragon Dictation System.

## 2023-08-01 ENCOUNTER — TELEPHONE (OUTPATIENT)
Dept: ORTHOPEDIC SURGERY | Facility: CLINIC | Age: 73
End: 2023-08-01

## 2023-08-01 NOTE — TELEPHONE ENCOUNTER
Provider: JUAN  Caller: LAURIE  Relationship to Patient: SELF  Pharmacy:   Phone Number: [phone]  Reason for Call: PT CALLING TO SEE IF SHE CAN GET EITHER A TONNY INJ OR GEL INJ

## 2023-08-02 ENCOUNTER — HOSPITAL ENCOUNTER (OUTPATIENT)
Dept: ULTRASOUND IMAGING | Facility: HOSPITAL | Age: 73
Discharge: HOME | End: 2023-08-02
Payer: MEDICARE

## 2023-08-02 ENCOUNTER — HOSPITAL ENCOUNTER (OUTPATIENT)
Dept: MAMMOGRAPHY | Facility: HOSPITAL | Age: 73
Discharge: HOME | End: 2023-08-02
Payer: MEDICARE

## 2023-08-02 DIAGNOSIS — R92.8 ABNORMAL MAMMOGRAM: ICD-10-CM

## 2023-08-02 PROCEDURE — G0279 TOMOSYNTHESIS, MAMMO: HCPCS

## 2023-08-02 PROCEDURE — 76642 ULTRASOUND BREAST LIMITED: CPT

## 2023-08-02 PROCEDURE — 77066 DX MAMMO INCL CAD BI: CPT

## 2023-09-27 RX ORDER — SERTRALINE HYDROCHLORIDE 100 MG/1
TABLET, FILM COATED ORAL
Qty: 90 TABLET | Refills: 3 | Status: SHIPPED | OUTPATIENT
Start: 2023-09-27

## 2023-11-06 ENCOUNTER — OFFICE VISIT (OUTPATIENT)
Dept: FAMILY MEDICINE CLINIC | Facility: CLINIC | Age: 73
End: 2023-11-06
Payer: OTHER MISCELLANEOUS

## 2023-11-06 VITALS
BODY MASS INDEX: 20.16 KG/M2 | SYSTOLIC BLOOD PRESSURE: 124 MMHG | OXYGEN SATURATION: 99 % | HEIGHT: 68 IN | DIASTOLIC BLOOD PRESSURE: 66 MMHG | HEART RATE: 71 BPM | TEMPERATURE: 97.8 F | WEIGHT: 133 LBS

## 2023-11-06 DIAGNOSIS — M50.30 DDD (DEGENERATIVE DISC DISEASE), CERVICAL: Primary | ICD-10-CM

## 2023-11-06 DIAGNOSIS — M51.36 DDD (DEGENERATIVE DISC DISEASE), LUMBAR: ICD-10-CM

## 2023-11-06 DIAGNOSIS — F43.23 SITUATIONAL MIXED ANXIETY AND DEPRESSIVE DISORDER: ICD-10-CM

## 2023-11-06 DIAGNOSIS — M54.50 ACUTE RIGHT-SIDED LOW BACK PAIN WITHOUT SCIATICA: ICD-10-CM

## 2023-11-06 DIAGNOSIS — M54.2 NECK PAIN: ICD-10-CM

## 2023-11-06 PROCEDURE — 99213 OFFICE O/P EST LOW 20 MIN: CPT | Performed by: PHYSICIAN ASSISTANT

## 2023-11-06 RX ORDER — BUPROPION HYDROCHLORIDE 150 MG/1
150 TABLET ORAL 2 TIMES DAILY
Qty: 180 TABLET | Refills: 3 | Status: SHIPPED | OUTPATIENT
Start: 2023-11-06

## 2023-11-06 RX ORDER — TIZANIDINE 2 MG/1
2 TABLET ORAL NIGHTLY PRN
Qty: 30 TABLET | Refills: 5 | Status: SHIPPED | OUTPATIENT
Start: 2023-11-06
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
## 2023-11-07 ENCOUNTER — TELEPHONE (OUTPATIENT)
Dept: FAMILY MEDICINE CLINIC | Facility: CLINIC | Age: 73
End: 2023-11-07

## 2023-11-07 NOTE — TELEPHONE ENCOUNTER
Provider: DR. HEREDIA    Caller: LAURIE VEE      Phone Number: [phone]     Reason for Call: PATIENT STATES OFFICE WAS WAITING FOR THIS INFORMATION  [facility]ER[facility] SWEETIE GOMEZ  #[phone] CLAIM #[id number]

## 2023-12-21 ENCOUNTER — TELEMEDICINE (OUTPATIENT)
Dept: FAMILY MEDICINE CLINIC | Facility: CLINIC | Age: 73
End: 2023-12-21
Payer: OTHER MISCELLANEOUS

## 2023-12-21 ENCOUNTER — TRANSCRIBE ORDERS (OUTPATIENT)
Dept: ADMINISTRATIVE | Facility: HOSPITAL | Age: 73
End: 2023-12-21
Payer: OTHER MISCELLANEOUS

## 2023-12-21 VITALS — BODY MASS INDEX: 20.22 KG/M2 | HEIGHT: 68 IN

## 2023-12-21 DIAGNOSIS — Z00.00 MEDICARE ANNUAL WELLNESS VISIT, SUBSEQUENT: Primary | ICD-10-CM

## 2023-12-21 DIAGNOSIS — J30.9 ALLERGIC RHINITIS, UNSPECIFIED SEASONALITY, UNSPECIFIED TRIGGER: ICD-10-CM

## 2023-12-21 DIAGNOSIS — M15.9 PRIMARY OSTEOARTHRITIS INVOLVING MULTIPLE JOINTS: ICD-10-CM

## 2023-12-21 DIAGNOSIS — M47.816 LUMBAR SPONDYLOSIS: ICD-10-CM

## 2023-12-21 DIAGNOSIS — R13.10 DYSPHAGIA, UNSPECIFIED TYPE: Primary | ICD-10-CM

## 2023-12-21 DIAGNOSIS — M47.812 SPONDYLOSIS OF CERVICAL REGION WITHOUT MYELOPATHY OR RADICULOPATHY: ICD-10-CM

## 2023-12-21 RX ORDER — CHLORPHENIRAMINE MALEATE 4 MG
4 TABLET ORAL DAILY
Qty: 90 TABLET | Refills: 3 | Status: SHIPPED | OUTPATIENT
Start: 2023-12-21

## 2023-12-21 RX ORDER — LEVOCETIRIZINE DIHYDROCHLORIDE 5 MG/1
5 TABLET, FILM COATED ORAL DAILY
Qty: 90 TABLET | Refills: 3 | Status: SHIPPED | OUTPATIENT
Start: 2023-12-21

## 2023-12-21 RX ORDER — TRAMADOL HYDROCHLORIDE 50 MG/1
100 TABLET ORAL EVERY 6 HOURS PRN
Qty: 240 TABLET | Refills: 5 | Status: SHIPPED | OUTPATIENT
Start: 2023-12-21

## 2023-12-21 RX ORDER — METHSCOPOLAMINE BROMIDE 2.5 MG/1
2.5 TABLET ORAL 4 TIMES DAILY PRN
Qty: 120 TABLET | Refills: 11 | Status: SHIPPED | OUTPATIENT
Start: 2023-12-21

## 2023-12-21 NOTE — PROGRESS NOTES
The ABCs of the Annual Wellness Visit  Subsequent Medicare Wellness Visit    Chief Complaint   Patient presents with    Medicare Wellness-subsequent      Subjective   History of Present Illness:  Marci Snyder is a 73 y.o. female who presents for a Subsequent Medicare Wellness Visit.  Patient presents and consents for telehealth/video visit examination.  Patient presents today for virtual visit from her home while I am at my office.  She presents today for an annual Medicare wellness visit and for medication refills.  Patient is doing well medications working well, allergies stable she is due for getting allergy shots at her allergist as well.  She has an upcoming appointment with Dr. Trevino for chronic neck and back pain and is scheduled for an MRI through his office for further evaluation.  She also is scheduled for upcoming physical therapy through Dr. Trevino for her neck and back.  The following portions of the patient's history were reviewed and   updated as appropriate: allergies, current medications, past medical history, past social history, past surgical history, and problem list.    Compared to one year ago, the patient feels her physical   health is the same.    Compared to one year ago, the patient feels her mental   health is the same.    Recent Hospitalizations:  She was not admitted to the hospital during the last year.       Current Medical Providers:  Patient Care Team:  Jimbo Vuong MD as PCP - General (Family Medicine)    Outpatient Medications Prior to Visit   Medication Sig Dispense Refill    acyclovir (Zovirax) 400 MG tablet Take 1 tablet by mouth 5 (Five) Times a Day. For fever blisters 25 tablet 5    buPROPion XL (WELLBUTRIN XL) 150 MG 24 hr tablet Take 1 tablet by mouth 2 (Two) Times a Day. 180 tablet 3    Cholecalciferol (Vitamin D3) 1.25 MG (91343 UT) tablet Take 1 tablet by mouth Daily. STOPPED TAKING FOR UPCOMING PROCEDURE      DHEA 50 MG capsule Take 1 capsule by  mouth Daily.      Diclofenac Sodium (VOLTAREN) 1 % gel gel APPLY 2 GRAMS TOPICALLY TO THE AFFECTED AREA TWICE DAILY AS DIRECTED FOR NECK PAIN 350 g 6    levothyroxine (SYNTHROID, LEVOTHROID) 25 MCG tablet TAKE 1 TABLET BY MOUTH EVERY MORNING ON AND EMPTY STOMACH AND WAIT 30 MINUTES BEFORE EATING, DRINKING, OR TAKING OTHER MEDS 90 tablet 3    Melatonin 10 MG capsule Take 5 capsules by mouth Every Night. Use as needed      montelukast (SINGULAIR) 10 MG tablet Take 1 tablet by mouth Every Night. (Patient taking differently: Take 1 tablet by mouth Daily.) 90 tablet 3    pilocarpine (SALAGEN) 5 MG tablet       PreviDent 5000 Booster Plus 1.1 % paste See Admin Instructions.      sertraline (ZOLOFT) 100 MG tablet TAKE 1 TABLET BY MOUTH DAILY 90 tablet 3    valACYclovir (VALTREX) 1000 MG tablet TAKE 2 TABLETS BY MOUTH WITH FIRST SIGN OF ONSET AND 2 TABLETS BY MOUTH 12 HOURS LATER.      Allergy 4 MG tablet Take 1 tablet by mouth Daily. WAL-FINATE      levocetirizine (XYZAL) 5 MG tablet Take 1 tablet by mouth Daily.  0    methscopolamine (PAMINE) 2.5 MG tablet Take 1 tablet by mouth 2 (Two) Times a Day As Needed (congestion). (Patient taking differently: Take 1 tablet by mouth 4 (Four) Times a Day.) 120 tablet 5    tiZANidine (ZANAFLEX) 2 MG tablet Take 1 tablet by mouth At Night As Needed for Muscle Spasms. 30 tablet 5    traMADol (ULTRAM) 50 MG tablet Take 2 tablets by mouth Every 6 (Six) Hours As Needed for Severe Pain. 240 tablet 5     Facility-Administered Medications Prior to Visit   Medication Dose Route Frequency Provider Last Rate Last Admin    cyanocobalamin injection 1,000 mcg  1,000 mcg Intramuscular Q28 Days Saranya Terrell PA-C   1,000 mcg at 10/08/19 1648    cyanocobalamin injection 1,000 mcg  1,000 mcg Intramuscular Q28 Days Saranya Terrell PA-C   1,000 mcg at 01/31/20 1454    cyanocobalamin injection 1,000 mcg  1,000 mcg Intramuscular Q28 Days Saranya Terrell PA-C   1,000 mcg at 11/04/21 1457  "      Opioid medication/s are on active medication list.  and I have evaluated her active treatment plan and pain score trends (see table).  Vitals:    12/21/23 1533   PainSc:   3     I have reviewed the chart for potential of high risk medication and harmful drug interactions in the elderly.          Aspirin is not on active medication list.  Aspirin use is not indicated based on review of current medical condition/s. Risk of harm outweighs potential benefits.  .    Patient Active Problem List   Diagnosis    Osteoporosis    Vitamin D deficiency    Sciatica of right side    Hypothyroidism    Depressive disorder    Osteoarthritis of shoulder    Allergic rhinitis    Benign paroxysmal positional vertigo    Pain in both feet    Acquired metatarsus varus of right foot    Contracture of joint of right foot    Osteomyelitis of right foot    Osteomyelitis of third toe of right foot    Cervical spinal stenosis    DDD (degenerative disc disease), cervical    Cervical spondylosis without myelopathy    Anxiety and depression     Advance Care Planning  ACP discussion was held with the patient during this visit. Pt declines needing any information    Review of Systems   HENT:  Negative for sore throat, trouble swallowing and voice change.    Respiratory:  Negative for shortness of breath.    Musculoskeletal:  Positive for back pain, neck pain and neck stiffness.   Skin:  Negative for rash and wound.   Allergic/Immunologic: Positive for environmental allergies.   Hematological:  Negative for adenopathy.         Objective      Vitals:    12/21/23 1533   Height: 172.7 cm (68\")   PainSc:   3     BMI Readings from Last 1 Encounters:   12/21/23 20.22 kg/m²   BMI is within normal parameters. No follow-up required.    Does the patient have evidence of cognitive impairment? No    Physical Exam  Constitutional:       General: She is not in acute distress.     Appearance: Normal appearance. She is well-developed. She is not ill-appearing, " toxic-appearing or diaphoretic.   HENT:      Head: Normocephalic and atraumatic.   Eyes:      Conjunctiva/sclera: Conjunctivae normal.   Pulmonary:      Effort: Pulmonary effort is normal. No respiratory distress.   Skin:     General: Skin is dry.      Coloration: Skin is not pale.      Findings: No erythema or rash.   Neurological:      Mental Status: She is alert and oriented to person, place, and time.      Coordination: Coordination normal.   Psychiatric:         Attention and Perception: She is attentive.         Mood and Affect: Mood normal.         Speech: Speech normal.         Behavior: Behavior normal.         Thought Content: Thought content normal.         Judgment: Judgment normal.                 HEALTH RISK ASSESSMENT    Smoking Status:  Social History     Tobacco Use   Smoking Status Never   Smokeless Tobacco Never     Alcohol Consumption:  Social History     Substance and Sexual Activity   Alcohol Use Yes    Comment: social     Fall Risk Screen:    STEADI Fall Risk Assessment was completed, and patient is at LOW risk for falls.Assessment completed on:2023    Depression Screenin/21/2023     3:31 PM   PHQ-2/PHQ-9 Depression Screening   Little Interest or Pleasure in Doing Things 0-->not at all   Feeling Down, Depressed or Hopeless 0-->not at all   PHQ-9: Brief Depression Severity Measure Score 0       Health Habits and Functional and Cognitive Screenin/21/2023     3:31 PM   Functional & Cognitive Status   Do you have difficulty preparing food and eating? No   Do you have difficulty bathing yourself, getting dressed or grooming yourself? No   Do you have difficulty using the toilet? No   Do you have difficulty moving around from place to place? No   Do you have trouble with steps or getting out of a bed or a chair? No   Current Diet Well Balanced Diet   Dental Exam Up to date   Eye Exam Up to date   Exercise (times per week) 0 times per week   Do you need help using the phone?   No   Are you deaf or do you have serious difficulty hearing?  No   Do you need help to go to places out of walking distance? No   Do you need help shopping? No   Do you need help preparing meals?  No   Do you need help with housework?  No   Do you need help with laundry? No   Do you need help taking your medications? No   Do you need help managing money? No   Do you ever drive or ride in a car without wearing a seat belt? No   Have you felt unusual stress, anger or loneliness in the last month? No   Who do you live with? Spouse   If you need help, do you have trouble finding someone available to you? No   Have you been bothered in the last four weeks by sexual problems? No   Do you have difficulty concentrating, remembering or making decisions? No       Age-appropriate Screening Schedule:  Refer to the list below for future screening recommendations based on patient's age, sex and/or medical conditions. Orders for these recommended tests are listed in the plan section. The patient has been provided with a written plan.    Health Maintenance   Topic Date Due    DXA SCAN  12/21/2023 (Originally 1/31/2022)    TDAP/TD VACCINES (1 - Tdap) 12/21/2024 (Originally 10/22/1969)    COLORECTAL CANCER SCREENING  10/01/2024    ANNUAL WELLNESS VISIT  12/21/2024    HEPATITIS C SCREENING  Completed    COVID-19 Vaccine  Completed    INFLUENZA VACCINE  Completed    Pneumococcal Vaccine 65+  Completed    ZOSTER VACCINE  Discontinued              Assessment & Plan     CMS Preventative Services Quick Reference  Risk Factors Identified During Encounter  Immunizations Discussed/Encouraged: Influenza  The above risks/problems have been discussed with the patient.  Follow up actions/plans if indicated are seen below in the Assessment/Plan Section.  Pertinent information has been shared with the patient in the After Visit Summary.    Diagnoses and all orders for this visit:    1. Medicare annual wellness visit, subsequent (Primary)    2.  Lumbar spondylosis  -     traMADol (ULTRAM) 50 MG tablet; Take 2 tablets by mouth Every 6 (Six) Hours As Needed for Severe Pain.  Dispense: 240 tablet; Refill: 5    3. Spondylosis of cervical region without myelopathy or radiculopathy  -     traMADol (ULTRAM) 50 MG tablet; Take 2 tablets by mouth Every 6 (Six) Hours As Needed for Severe Pain.  Dispense: 240 tablet; Refill: 5    4. Primary osteoarthritis involving multiple joints  -     traMADol (ULTRAM) 50 MG tablet; Take 2 tablets by mouth Every 6 (Six) Hours As Needed for Severe Pain.  Dispense: 240 tablet; Refill: 5    5. Allergic rhinitis, unspecified seasonality, unspecified trigger    Other orders  -     Allergy 4 MG tablet; Take 1 tablet by mouth Daily. WAL-FINATE  Dispense: 90 tablet; Refill: 3  -     levocetirizine (XYZAL) 5 MG tablet; Take 1 tablet by mouth Daily.  Dispense: 90 tablet; Refill: 3  -     methscopolamine (PAMINE) 2.5 MG tablet; Take 1 tablet by mouth 4 (Four) Times a Day As Needed (congestion).  Dispense: 120 tablet; Refill: 11    As part of this patient's treatment plan, patient will be prescribed controlled substances. The patient has been made aware of appropriate use of such medications, including potential risk of somnolence, limited ability to drive and /or work safely, and potential for dependence or overdose. It has also been made clear that these medications are for use by this patient only, without concomitant use of alcohol or other substances unless prescribed.Controlled substance status of medication discussed with patient, discussed risks of medication including abuse potential and diversion potential and need to follow up for reevaluation appointment in order to receive further refills.    Part of this note may be an electronic transcription/translation of spoken language to printed text using the Dragon Dictation System.      I spent 15 minutes in patient care: Reviewing records prior to the visit, examining the patient,  entering orders and documentation    Part of this note may be an electronic transcription/translation of spoken language to printed text using the Dragon Dictation System.        Follow Up:  Return in about 6 months (around 6/21/2024) for Next scheduled follow up.     An After Visit Summary and PPPS were given to the patient.

## 2024-01-16 ENCOUNTER — TELEPHONE (OUTPATIENT)
Dept: ORTHOPEDIC SURGERY | Facility: CLINIC | Age: 74
End: 2024-01-16
Payer: OTHER MISCELLANEOUS

## 2024-01-16 ENCOUNTER — TELEPHONE (OUTPATIENT)
Dept: ORTHOPEDIC SURGERY | Facility: CLINIC | Age: 74
End: 2024-01-16

## 2024-01-16 NOTE — TELEPHONE ENCOUNTER
Called the  [employer] to verify that the Patient still had a PIP open and if the patients knees were covered under the claim.

## 2024-01-17 NOTE — TELEPHONE ENCOUNTER
ADJUSTOR CALLED BACK AND STATED THAT CLAIM IS OPEN AND BILLABLE. JUST BECAUSE CLAIM IS BILLED DOESN'T MEAN THEY WILL PROVIDE PAYMENT. IT WILL BE REVIEWED.

## 2024-02-01 ENCOUNTER — OFFICE VISIT (OUTPATIENT)
Dept: ORTHOPEDIC SURGERY | Facility: CLINIC | Age: 74
End: 2024-02-01
Payer: MEDICARE

## 2024-02-01 VITALS
WEIGHT: 134.8 LBS | SYSTOLIC BLOOD PRESSURE: 138 MMHG | DIASTOLIC BLOOD PRESSURE: 70 MMHG | HEIGHT: 68 IN | BODY MASS INDEX: 20.43 KG/M2

## 2024-02-01 DIAGNOSIS — M17.12 PRIMARY OSTEOARTHRITIS OF LEFT KNEE: Primary | ICD-10-CM

## 2024-02-01 DIAGNOSIS — M11.20 CHONDROCALCINOSIS: ICD-10-CM

## 2024-02-01 DIAGNOSIS — M17.11 PRIMARY OSTEOARTHRITIS OF RIGHT KNEE: ICD-10-CM

## 2024-02-01 RX ORDER — EPINEPHRINE 0.3 MG/.3ML
INJECTION SUBCUTANEOUS
COMMUNITY

## 2024-02-01 RX ORDER — BUPIVACAINE HYDROCHLORIDE 2.5 MG/ML
3 INJECTION, SOLUTION EPIDURAL; INFILTRATION; INTRACAUDAL
Status: COMPLETED | OUTPATIENT
Start: 2024-02-01 | End: 2024-02-01

## 2024-02-01 RX ORDER — TRIAMCINOLONE ACETONIDE 40 MG/ML
2 INJECTION, SUSPENSION INTRA-ARTICULAR; INTRAMUSCULAR
Status: COMPLETED | OUTPATIENT
Start: 2024-02-01 | End: 2024-02-01

## 2024-02-01 RX ORDER — CETIRIZINE HYDROCHLORIDE 10 MG/1
TABLET ORAL
COMMUNITY

## 2024-02-01 RX ORDER — LIDOCAINE HYDROCHLORIDE 10 MG/ML
3 INJECTION, SOLUTION EPIDURAL; INFILTRATION; INTRACAUDAL; PERINEURAL
Status: COMPLETED | OUTPATIENT
Start: 2024-02-01 | End: 2024-02-01

## 2024-02-01 RX ORDER — NAPROXEN 500 MG/1
TABLET ORAL
COMMUNITY

## 2024-02-01 RX ADMIN — TRIAMCINOLONE ACETONIDE 2 ML: 40 INJECTION, SUSPENSION INTRA-ARTICULAR; INTRAMUSCULAR at 15:40

## 2024-02-01 RX ADMIN — BUPIVACAINE HYDROCHLORIDE 3 ML: 2.5 INJECTION, SOLUTION EPIDURAL; INFILTRATION; INTRACAUDAL at 15:40

## 2024-02-01 RX ADMIN — LIDOCAINE HYDROCHLORIDE 3 ML: 10 INJECTION, SOLUTION EPIDURAL; INFILTRATION; INTRACAUDAL; PERINEURAL at 15:40

## 2024-02-01 NOTE — PROGRESS NOTES
Procedure   - Large Joint Arthrocentesis: bilateral knee on 2/1/2024 3:40 PM  Indications: pain  Details: 22 G needle, anterolateral approach  Medications (Right): 3 mL bupivacaine (PF) 0.25 %; 3 mL lidocaine PF 1% 1 %; 2 mL triamcinolone acetonide 40 MG/ML  Medications (Left): 3 mL bupivacaine (PF) 0.25 %; 3 mL lidocaine PF 1% 1 %; 2 mL triamcinolone acetonide 40 MG/ML  Outcome: tolerated well, no immediate complications  Procedure, treatment alternatives, risks and benefits explained, specific risks discussed. Consent was given by the patient. Immediately prior to procedure a time out was called to verify the correct patient, procedure, equipment, support staff and site/side marked as required. Patient was prepped and draped in the usual sterile fashion.

## 2024-02-01 NOTE — PROGRESS NOTES
Orthopaedic Clinic Note: Knee Established Patient    Chief Complaint   Patient presents with    Follow-up     10.5 month follow up - Primary osteoarthritis of left knee    Right knee pain    HPI    It has been 10  month(s) since Ms. Snyder's last visit. She returns to clinic today for follow-up left knee osteoarthritis as well as right knee pain.  Patient last seen half months ago and underwent intra-articular injection left knee.  She was doing well up until about 2 months ago when she started developing recurrence of knee pain.  She currently complains of pain that she rates 10/10 on the pain scale.  Right knee is an 8/10 on the pain scale.  She is ambulating with no assistive device.  She complaining of swelling and stiffness in both knees.  Overall she is doing worse.    Past Medical History:   Diagnosis Date    Allergic rhinitis     Benign paroxysmal positional vertigo     Bursitis/tendonitis, shoulder     Chronic nonsuppurative otitis media     Depressive disorder     Fatigue     Headache, cervicogenic     Hypothyroidism     Insomnia     Myalgia     Nail fungus     Nervousness     OA (osteoarthritis) of shoulder     CHIQUITA (obstructive sleep apnea)     COMPLIANT WITH CPAP USE    Osteoporosis     Preventive measure     Sciatica of right side     TSH (thyroid-stimulating hormone deficiency)     Vitamin D deficiency     Wears glasses       Past Surgical History:   Procedure Laterality Date    AMPUTATION DIGIT Right 07/13/2021    Procedure: AMPUTATE 3RD TOE AT PIP JOINT RIGHT;  Surgeon: Gwendolyn Del Rosario MD;  Location: [facility];  Service: Orthopedics;  Laterality: Right;    CHOLECYSTECTOMY      COLONOSCOPY      FOOT SURGERY Right     pinched nerve    FOOT SURGERY Left 01/17/2019    1st MPJ fusion - Dr. Gwendolyn WEST. Miguel Ángel ; [facility]     GALLBLADDER SURGERY  2023    [place]    NOSE SURGERY  1985    DEVIATED SEPTUM      Family History   Problem Relation Age of Onset    Stroke Mother     Parkinsonism  Mother     Cancer Father             Breast cancer Sister 73    Breast cancer Paternal Aunt         DX AGE UNKNOWN    Breast cancer Paternal Aunt         DX AGE UNKNOWN    Breast cancer Cousin         SEVERAL COUSINS - DX AGES UNKNOWN    Ovarian cancer Neg Hx      Social History     Socioeconomic History    Marital status:    Tobacco Use    Smoking status: Never    Smokeless tobacco: Never   Vaping Use    Vaping Use: Never used   Substance and Sexual Activity    Alcohol use: Yes     Comment: social    Drug use: Never    Sexual activity: Yes     Partners: Male     Birth control/protection: None      Current Outpatient Medications on File Prior to Visit   Medication Sig Dispense Refill    acyclovir (Zovirax) 400 MG tablet Take 1 tablet by mouth 5 (Five) Times a Day. For fever blisters 25 tablet 5    Allergy 4 MG tablet Take 1 tablet by mouth Daily. WAL-FINATE 90 tablet 3    buPROPion XL (WELLBUTRIN XL) 150 MG 24 hr tablet Take 1 tablet by mouth 2 (Two) Times a Day. 180 tablet 3    cetirizine (zyrTEC) 10 MG tablet       Cholecalciferol (Vitamin D3) 1.25 MG (78765 UT) tablet Take 1 tablet by mouth Daily. STOPPED TAKING FOR UPCOMING PROCEDURE      DHEA 50 MG capsule Take 1 capsule by mouth Daily.      Diclofenac Sodium (VOLTAREN) 1 % gel gel APPLY 2 GRAMS TOPICALLY TO THE AFFECTED AREA TWICE DAILY AS DIRECTED FOR NECK PAIN 350 g 6    EPINEPHrine (EPIPEN) 0.3 MG/0.3ML solution auto-injector injection       levocetirizine (XYZAL) 5 MG tablet Take 1 tablet by mouth Daily. 90 tablet 3    levothyroxine (SYNTHROID, LEVOTHROID) 25 MCG tablet TAKE 1 TABLET BY MOUTH EVERY MORNING ON AND EMPTY STOMACH AND WAIT 30 MINUTES BEFORE EATING, DRINKING, OR TAKING OTHER MEDS 90 tablet 3    Melatonin 10 MG capsule Take 5 capsules by mouth Every Night. Use as needed      methscopolamine (PAMINE) 2.5 MG tablet Take 1 tablet by mouth 4 (Four) Times a Day As Needed (congestion). 120 tablet 11    montelukast (SINGULAIR) 10 MG  "tablet Take 1 tablet by mouth Every Night. (Patient taking differently: Take 1 tablet by mouth Daily.) 90 tablet 3    naproxen (NAPROSYN) 500 MG tablet       PreviDent 5000 Booster Plus 1.1 % paste See Admin Instructions.      sertraline (ZOLOFT) 100 MG tablet TAKE 1 TABLET BY MOUTH DAILY 90 tablet 3    traMADol (ULTRAM) 50 MG tablet Take 2 tablets by mouth Every 6 (Six) Hours As Needed for Severe Pain. 240 tablet 5    valACYclovir (VALTREX) 1000 MG tablet TAKE 2 TABLETS BY MOUTH WITH FIRST SIGN OF ONSET AND 2 TABLETS BY MOUTH 12 HOURS LATER.      pilocarpine (SALAGEN) 5 MG tablet  (Patient not taking: Reported on 2/1/2024)       Current Facility-Administered Medications on File Prior to Visit   Medication Dose Route Frequency Provider Last Rate Last Admin    cyanocobalamin injection 1,000 mcg  1,000 mcg Intramuscular Q28 Days Saranya Terrell PA-C   1,000 mcg at 10/08/19 1648    cyanocobalamin injection 1,000 mcg  1,000 mcg Intramuscular Q28 Days Saranya Terrell PA-C   1,000 mcg at 01/31/20 1454    cyanocobalamin injection 1,000 mcg  1,000 mcg Intramuscular Q28 Days Saranya Tererll PA-C   1,000 mcg at 11/04/21 1453      Allergies   Allergen Reactions    Penicillins Anaphylaxis     Prev. Tolerated Keflex        Review of Systems   Constitutional: Negative.    HENT: Negative.     Eyes: Negative.    Respiratory: Negative.     Cardiovascular: Negative.    Gastrointestinal: Negative.    Endocrine: Negative.    Genitourinary: Negative.    Musculoskeletal:  Positive for arthralgias.   Skin: Negative.    Allergic/Immunologic: Negative.    Neurological: Negative.    Hematological: Negative.    Psychiatric/Behavioral: Negative.          The patient's Review of Systems was personally reviewed and confirmed as accurate.    Physical Exam  Blood pressure 138/70, height 172.7 cm (68\"), weight 61.1 kg (134 lb 12.8 oz), not currently breastfeeding.    Body mass index is 20.5 kg/m².    GENERAL APPEARANCE: awake, alert, " oriented, in no acute distress and well developed, well nourished  LUNGS:  breathing nonlabored  EXTREMITIES: no clubbing, cyanosis  PERIPHERAL PULSES: palpable dorsalis pedis and posterior tibial pulses bilaterally.    GAIT:  Antalgic        ----------  Bilateral Knee Exam:  ----------  ALIGNMENT: mild varus, correctible to neutral  ----------  RANGE OF MOTION:  Normal (0-120 degrees) with no extensor lag or flexion contracture  LIGAMENTOUS STABILITY:   stable to varus and valgus stress at terminal extension and 30 degrees; retensioning of the MCL is appreciated with valgus stress at 30 degrees consistent with medial compartment degeneration  ----------  STRENGTH:  KNEE FLEXION 5/5  KNEE EXTENSION  5/5  ANKLE DORSIFLEXION  5/5  ANKLE PLANTARFLEXION  5/5  ----------  PAIN WITH PALPATION: Global on left, medial and anterior on right   KNEE EFFUSION: yes, trace effusion bilateral   PAIN WITH KNEE ROM: yes, bilateral   PATELLAR CREPITUS:  yes, painful and symptomatic  ----------  SENSATION TO LIGHT TOUCH:  DEEP PERONEAL/SUPERFICIAL PERONEAL/SURAL/SAPHENOUS/TIBIAL:    intact  ----------  EDEMA:  no  ERYTHEMA:    no  WOUNDS/INCISIONS:   no  _____________________________________________________________________  _____________________________________________________________________    RADIOGRAPHIC FINDINGS:   Indication: Left knee pain    Comparison: Todays xrays were compared to previous xrays from 1/17/2023    Knee films: Moderate tricompartmental osteoarthritis with genu varum alignment.  Small periarticular osteophytes visualized in all compartments.  Chondrocalcinosis of the medial lateral compartments is identified.  No acute bony injury or fracture.  No significant changes compared to prior radiographs.    Assessment/Plan:   Diagnosis Plan   1. Primary osteoarthritis of left knee  XR Knee 4+ View Left      2. Primary osteoarthritis of right knee        3. Chondrocalcinosis          Patient is suffering arthritic  flareup of the bilateral knees.  In regards to the left knee, the patient has failed conservative treatment measures and is a candidate for joint arthroplasty.  I discussed the joint arthroplasty surgical process as well as the recovery and rehabilitation time frame.  The patient asked several questions regarding the joint arthroplasty surgery, which were answered accordingly.  Ultimately, the patient declines surgical intervention at this time and wishes to continue with conservative treatment measures.  Alternative conservative treatment measures were discussed including bracing, therapy, topical/oral anti-inflammatories, activity modification, and weight loss.  The patient considered these treatment options and wishes to proceed with corticosteroid injection(s) today.  Therefore we will proceed with corticosteroid injection(s) today.  She is also agreeable to a right knee cortisone injection today.  She will follow-up as needed.    Procedure Note:  I discussed with the patient the potential benefits of performing a therapeutic injections of the bilateral knees as well as potential risks including but not limited to infection, swelling, pain, bleeding, bruising, nerve/vessel damage, skin color changes, transient elevation in blood glucose levels, and fat atrophy. After informed consent and verifying correct patient, procedure site, and type of procedure, the areas were prepped with alcohol, ethyl chloride was used to numb the skin. Via the superolateral approach, 3 cc of 1% lidocaine, 3 cc of 0.25% Marcaine and 2 cc of 40mg/ml of Kenalog were each injected into the bilateral knees. The patient tolerated the procedures well. There were no complications. A sterile dressing was placed over each injection site.      Abiodun Baird MD  02/01/24  15:42 EST

## 2024-04-18 RX ORDER — ACYCLOVIR 400 MG/1
TABLET ORAL
Qty: 25 TABLET | Refills: 5 | Status: SHIPPED | OUTPATIENT
Start: 2024-04-18

## 2024-07-17 ENCOUNTER — OFFICE VISIT (OUTPATIENT)
Dept: FAMILY MEDICINE CLINIC | Facility: CLINIC | Age: 74
End: 2024-07-17
Payer: MEDICARE

## 2024-07-17 VITALS
BODY MASS INDEX: 20.52 KG/M2 | HEIGHT: 68 IN | HEART RATE: 74 BPM | TEMPERATURE: 98.4 F | OXYGEN SATURATION: 99 % | DIASTOLIC BLOOD PRESSURE: 68 MMHG | SYSTOLIC BLOOD PRESSURE: 126 MMHG | WEIGHT: 135.4 LBS

## 2024-07-17 DIAGNOSIS — M15.9 PRIMARY OSTEOARTHRITIS INVOLVING MULTIPLE JOINTS: ICD-10-CM

## 2024-07-17 DIAGNOSIS — M47.816 LUMBAR SPONDYLOSIS: ICD-10-CM

## 2024-07-17 DIAGNOSIS — E03.9 ACQUIRED HYPOTHYROIDISM: ICD-10-CM

## 2024-07-17 DIAGNOSIS — M47.812 SPONDYLOSIS OF CERVICAL REGION WITHOUT MYELOPATHY OR RADICULOPATHY: ICD-10-CM

## 2024-07-17 PROCEDURE — 99214 OFFICE O/P EST MOD 30 MIN: CPT | Performed by: PHYSICIAN ASSISTANT

## 2024-07-17 PROCEDURE — 1125F AMNT PAIN NOTED PAIN PRSNT: CPT | Performed by: PHYSICIAN ASSISTANT

## 2024-07-17 PROCEDURE — 1160F RVW MEDS BY RX/DR IN RCRD: CPT | Performed by: PHYSICIAN ASSISTANT

## 2024-07-17 PROCEDURE — 1159F MED LIST DOCD IN RCRD: CPT | Performed by: PHYSICIAN ASSISTANT

## 2024-07-17 RX ORDER — TRAMADOL HYDROCHLORIDE 50 MG/1
100 TABLET ORAL EVERY 6 HOURS PRN
Qty: 240 TABLET | Refills: 5 | Status: SHIPPED | OUTPATIENT
Start: 2024-07-17

## 2024-07-17 RX ORDER — MONTELUKAST SODIUM 10 MG/1
10 TABLET ORAL NIGHTLY
COMMUNITY
End: 2024-07-17 | Stop reason: SDUPTHER

## 2024-07-17 RX ORDER — HYDROXYZINE HYDROCHLORIDE 25 MG/1
25 TABLET, FILM COATED ORAL 3 TIMES DAILY PRN
COMMUNITY

## 2024-07-17 RX ORDER — MONTELUKAST SODIUM 10 MG/1
10 TABLET ORAL NIGHTLY
Qty: 90 TABLET | Refills: 3 | Status: SHIPPED | OUTPATIENT
Start: 2024-07-17

## 2024-07-17 RX ORDER — LEVOTHYROXINE SODIUM 0.03 MG/1
25 TABLET ORAL
Qty: 90 TABLET | Refills: 3 | Status: SHIPPED | OUTPATIENT
Start: 2024-07-17 | End: 2024-07-17

## 2024-07-17 RX ORDER — MONTELUKAST SODIUM 10 MG/1
10 TABLET ORAL NIGHTLY
Qty: 30 TABLET | Refills: 11 | Status: SHIPPED | OUTPATIENT
Start: 2024-07-17 | End: 2024-07-17

## 2024-07-17 RX ORDER — LEVOTHYROXINE SODIUM 0.03 MG/1
25 TABLET ORAL
Qty: 90 TABLET | Refills: 3 | Status: SHIPPED | OUTPATIENT
Start: 2024-07-17

## 2024-07-17 NOTE — PROGRESS NOTES
Subjective   Marci Snyder is a 73 y.o. female  DDD (Refill on Tramadol ), Allergies (Refill singulair ), and Hypothyroidism (Refill on levothyroxine )      History of Present Illness  History of Present Illness  The patient is coming in today for follow-up on her hypothyroidism as well as her arthritis and seasonal allergies. She is due for refills on her medications.    The patient has been experiencing episodes of dizziness and vertigo, which have been diagnosed as cervical vertigo. These episodes are triggered by rapid movements, turning around, or standing up quickly. She has refrained from cycling due to these symptoms.    The patient began experiencing severe pain in her left knee after engaging in yard work. She sought medical attention from a knee specialist who administered a steroid injection in her left knee. The specialist diagnosed her with a meniscus tear and recommended surgical intervention. She has received 4 steroid injections in her left knee. She is currently undergoing dry needling at [facility] in [place], [place], which has provided some relief.    Supplemental Information  She is scheduled to get steroid injections in her back on 07/29/2023 by Dr. Zachary Trevino.    The following portions of the patient's history were reviewed and updated as appropriate: allergies, current medications, past social history and problem list    Review of Systems   Constitutional:  Positive for activity change.   HENT:  Positive for congestion.    Respiratory: Negative.     Gastrointestinal: Negative.    Genitourinary: Negative.    Musculoskeletal:  Positive for arthralgias and back pain. Negative for gait problem and myalgias.   Neurological:  Positive for dizziness. Negative for tremors, weakness and numbness.       Objective     Vitals:    07/17/24 1555   BP: 126/68   Pulse: 74   Temp: 98.4 °F (36.9 °C)   SpO2: 99%       Physical Exam  Vitals and nursing note reviewed.   Constitutional:       General:  She is not in acute distress.     Appearance: Normal appearance. She is well-developed. She is not ill-appearing, toxic-appearing or diaphoretic.   Neck:      Vascular: No carotid bruit or JVD.   Cardiovascular:      Rate and Rhythm: Normal rate and regular rhythm.      Pulses: Normal pulses.      Heart sounds: Normal heart sounds. No murmur heard.  Pulmonary:      Effort: Pulmonary effort is normal. No respiratory distress.      Breath sounds: Normal breath sounds.   Abdominal:      Palpations: Abdomen is soft.      Tenderness: There is no abdominal tenderness.   Musculoskeletal:         General: Deformity (arthritis in feet, hands, knees) present.   Skin:     General: Skin is warm and dry.   Neurological:      Mental Status: She is alert.      Gait: Gait normal.   Psychiatric:         Mood and Affect: Mood normal.         Behavior: Behavior normal.         Thought Content: Thought content normal.         Judgment: Judgment normal.       Physical Exam      Assessment & Plan   Assessment & Plan  1. Cervical vertigo.    Diagnoses and all orders for this visit:    1. Acquired hypothyroidism  -     Discontinue: levothyroxine (SYNTHROID, LEVOTHROID) 25 MCG tablet; Take 1 tablet by mouth Every Morning.  Dispense: 90 tablet; Refill: 3  -     levothyroxine (SYNTHROID, LEVOTHROID) 25 MCG tablet; Take 1 tablet by mouth Every Morning.  Dispense: 90 tablet; Refill: 3    2. Lumbar spondylosis  -     traMADol (ULTRAM) 50 MG tablet; Take 2 tablets by mouth Every 6 (Six) Hours As Needed for Severe Pain.  Dispense: 240 tablet; Refill: 5    3. Spondylosis of cervical region without myelopathy or radiculopathy  -     traMADol (ULTRAM) 50 MG tablet; Take 2 tablets by mouth Every 6 (Six) Hours As Needed for Severe Pain.  Dispense: 240 tablet; Refill: 5    4. Primary osteoarthritis involving multiple joints  -     traMADol (ULTRAM) 50 MG tablet; Take 2 tablets by mouth Every 6 (Six) Hours As Needed for Severe Pain.  Dispense: 240  tablet; Refill: 5    Other orders  -     Discontinue: montelukast (SINGULAIR) 10 MG tablet; Take 1 tablet by mouth Every Night.  Dispense: 30 tablet; Refill: 11  -     montelukast (SINGULAIR) 10 MG tablet; Take 1 tablet by mouth Every Night.  Dispense: 90 tablet; Refill: 3     As part of this patient's treatment plan, patient will be prescribed controlled substances. The patient has been made aware of appropriate use of such medications, including potential risk of somnolence, limited ability to drive and /or work safely, and potential for dependence or overdose. It has also been made clear that these medications are for use by this patient only, without concomitant use of alcohol or other substances unless prescribed.Controlled substance status of medication discussed with patient, discussed risks of medication including abuse potential and diversion potential and need to follow up for reevaluation appointment in order to receive further refills.    Part of this note may be an electronic transcription/translation of spoken language to printed text using the Dragon Dictation System.        Patient or patient representative verbalized consent for the use of Ambient Listening during the visit with  Saranya Terrell PA-C for chart documentation. 7/17/2024  16:58 EDT

## 2024-07-22 ENCOUNTER — TRANSCRIBE ORDERS (OUTPATIENT)
Dept: ADMINISTRATIVE | Facility: HOSPITAL | Age: 74
End: 2024-07-22
Payer: OTHER MISCELLANEOUS

## 2024-07-22 DIAGNOSIS — R13.10 DYSPHAGIA, UNSPECIFIED TYPE: Primary | ICD-10-CM

## 2024-08-05 ENCOUNTER — HOSPITAL ENCOUNTER (OUTPATIENT)
Dept: GENERAL RADIOLOGY | Facility: HOSPITAL | Age: 74
Discharge: HOME | End: 2024-08-05
Admitting: OTOLARYNGOLOGY
Payer: MEDICARE

## 2024-08-05 DIAGNOSIS — R13.10 DYSPHAGIA, UNSPECIFIED TYPE: ICD-10-CM

## 2024-08-05 PROCEDURE — 74220 X-RAY XM ESOPHAGUS 1CNTRST: CPT

## 2024-08-05 RX ADMIN — BARIUM SULFATE 240 ML: 960 POWDER, FOR SUSPENSION ORAL at 11:12

## 2024-09-11 RX ORDER — MUPIROCIN 20 MG/G
1 OINTMENT TOPICAL 3 TIMES DAILY
Qty: 30 G | Refills: 1 | Status: SHIPPED | OUTPATIENT
Start: 2024-09-11

## 2024-09-11 NOTE — TELEPHONE ENCOUNTER
Caller: Marci Snyder    Relationship: Self    Best call back number: [phone]     Requested Prescriptions: MUPIROCIN ONIMENT 2 %          Pharmacy where request should be sent: [facility] #[id number] - [address] AT [facility] [phone]  - [phone] FX     Last office visit with prescribing clinician: Visit date not found   Last telemedicine visit with prescribing clinician: Visit date not found   Next office visit with prescribing clinician: Visit date not found     Additional details provided by patient: PATIENT FELL AND HURT HER  LEFT KNEE. PATIENT WOULD BE AVAILABLE FOR A TELEHEALTH APPOINTMENT TOMORROW OR FRIDAY IF SHE NEEDS TO BE SEEN     Does the patient have less than a 3 day supply:  [x] Yes      Would you like a call back once the refill request has been completed: [] Yes [x] No    If the office needs to give you a call back, can they leave a voicemail: [] Yes [x] No    Yolanda Stinson MA   09/11/24 09:59 EDT

## 2024-09-17 RX ORDER — CHLORPHENIRAMINE MALEATE 4 MG/1
4 TABLET ORAL DAILY
Qty: 90 TABLET | Refills: 3 | Status: SHIPPED | OUTPATIENT
Start: 2024-09-17

## 2024-10-02 ENCOUNTER — TRANSCRIBE ORDERS (OUTPATIENT)
Dept: ADMINISTRATIVE | Facility: HOSPITAL | Age: 74
End: 2024-10-02
Payer: OTHER MISCELLANEOUS

## 2024-10-02 DIAGNOSIS — Z12.31 SCREENING MAMMOGRAM FOR BREAST CANCER: Primary | ICD-10-CM

## 2024-10-08 ENCOUNTER — OFFICE VISIT (OUTPATIENT)
Dept: FAMILY MEDICINE CLINIC | Facility: CLINIC | Age: 74
End: 2024-10-08
Payer: MEDICARE

## 2024-10-08 VITALS
DIASTOLIC BLOOD PRESSURE: 68 MMHG | OXYGEN SATURATION: 97 % | TEMPERATURE: 98 F | SYSTOLIC BLOOD PRESSURE: 122 MMHG | BODY MASS INDEX: 20.68 KG/M2 | WEIGHT: 136 LBS | HEART RATE: 71 BPM

## 2024-10-08 DIAGNOSIS — F43.21 SITUATIONAL DEPRESSION: Primary | ICD-10-CM

## 2024-10-08 PROCEDURE — 1125F AMNT PAIN NOTED PAIN PRSNT: CPT | Performed by: PHYSICIAN ASSISTANT

## 2024-10-08 PROCEDURE — 1160F RVW MEDS BY RX/DR IN RCRD: CPT | Performed by: PHYSICIAN ASSISTANT

## 2024-10-08 PROCEDURE — 99213 OFFICE O/P EST LOW 20 MIN: CPT | Performed by: PHYSICIAN ASSISTANT

## 2024-10-08 PROCEDURE — 1159F MED LIST DOCD IN RCRD: CPT | Performed by: PHYSICIAN ASSISTANT

## 2024-10-08 RX ORDER — SERTRALINE HYDROCHLORIDE 100 MG/1
TABLET, FILM COATED ORAL
Qty: 45 TABLET | Refills: 11 | Status: SHIPPED | OUTPATIENT
Start: 2024-10-08
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
## 2024-11-11 ENCOUNTER — NURSE TRIAGE (OUTPATIENT)
Dept: CALL CENTER | Facility: HOSPITAL | Age: 74
End: 2024-11-11
Payer: OTHER MISCELLANEOUS

## 2024-11-11 ENCOUNTER — TELEPHONE (OUTPATIENT)
Dept: FAMILY MEDICINE CLINIC | Facility: CLINIC | Age: 74
End: 2024-11-11
Payer: OTHER MISCELLANEOUS

## 2024-11-11 NOTE — TELEPHONE ENCOUNTER
HUB call. Caller with low blood sugars, unable to reach PCP office, [phone].    Spoke with caller who reports over the last 2 weeks having episodes of low blood sugars as low as 45 with associated symptoms of diaphoresis, shakiness and weakness. States is not diabetic, not on any diabetic meds.    Caller reports fasting a.m. blood sugars of 74-94 the last 5 days.During episodes of diaphoresis, shakiness, she has checked sugar and has been as low as 45.  will eat snak and blood sugar improves but it leaves her feeling very weak.    Guidelines followed, protocols reviewed.   Attempted call to PCP office ([phone]), no answer and line disconnected after numerous rings.  Caller then was transferred back to [facility] after this nurse reviewed with [facility] staff that patient has been triaged and needs same day appointment to be further evaluated. [facility] stated will have to send her thru to office.    Reason for Disposition   [1] Caller has NON-URGENT medication or insulin pump question AND [2] triager unable to answer question   Low blood sugar definition and treatment, questions about    Additional Information   Negative: Unconscious or difficult to awaken   Negative: Seizure occurs   Negative: Acting confused (e.g., disoriented, slurred speech)   Negative: Very weak (e.g., can't stand)   Negative: Sounds like a life-threatening emergency to the triager   Negative: [1] Vomiting AND [2] signs of dehydration (e.g., very dry mouth, lightheaded, dark urine)   Negative: [1] Low blood sugar symptoms persist > 30 minutes AND [2] using low blood sugar Care Advice   Negative: [1] Low blood glucose (70 mg/dl [3.9 mmol/l] or below) persists > 30 minutes AND [2] using low blood sugar Care Advice   Negative: Patient sounds very sick or weak to the triager   Negative: [1] Low blood sugar symptoms with no other adult present AND [2] hasn't tried Care Advice   Negative: [1] Low blood glucose (70 mg/dl [3.9 mmol/l] or below)) with no other adult  "present AND [2] hasn't tried Care Advice   Negative: Diabetes drug error or overdose (e.g., insulin error or extra dose)   Negative: [1] Caller has URGENT medication or insulin pump question AND [2] triager unable to answer question   Negative: [1] Blood glucose 70  mg/dL (3.9 mmol/L) or below OR symptomatic, now improved with Care Advice AND [2] cause unknown   Negative: [1] Morning (before breakfast) blood glucose < 80 mg/dL (4.4 mmol/L) AND [2] more than once in past week   Negative: [1] Evening (after bedtime snack) blood glucose < 100 mg/dL (5.6 mmol/L) AND [2] more than once in past week   Negative: [1] Blood glucose 70 mg/dl (3.9 mmol/l) or below, OR symptomatic AND [2] cause known    Answer Assessment - Initial Assessment Questions  1. SYMPTOMS: \"What symptoms are you concerned about?\"      States has been having low fasting blood sugars and associated symptoms of diaphoresis, weakness and shakiness. No current symptoms at this time  2. ONSET:  \"When did the symptoms start?\"      Over the last 2 weeks  3. BLOOD GLUCOSE: \"What is your blood glucose level?\"       Fasting this a.m. 83  4. USUAL RANGE: \"What is your blood glucose level usually?\" (e.g., usual fasting morning value, usual evening value)      States unsure but thinks around 100.  5. TYPE 1 or 2:  \"Do you know what type of diabetes you have?\"  (e.g., Type 1, Type 2, Gestational; doesn't know)       Not diagnosed as diabetic  6. INSULIN: \"Do you take insulin?\" \"What type of insulin(s) do you use? What is the mode of delivery? (syringe, pen; injection or pump) \"When did you last give yourself an insulin dose?\" (i.e., time or hours/minutes ago) \"How much did you give?\" (i.e., how many units)      Not on any diabetic meds  7. DIABETES PILLS: \"Do you take any pills for your diabetes?\" If Yes, ask: \"What is the name of the medicine(s) that you take for high blood sugar?\"      Not on any diabetic meds  8. OTHER SYMPTOMS: \"Do you have any symptoms?\" (e.g., " "fever, frequent urination, difficulty breathing, vomiting)      Denies  9. LOW BLOOD GLUCOSE TREATMENT: \"What have you done so far to treat the low blood glucose level?\"      States she has increased food intake at night and if symptomatic will eat peanut butter crackers  10. FOOD: \"When did you last eat or drink?\"        This a.m.  11. ALONE: \"Are you alone right now or is someone with you?\"         Spouse is with her  12. PREGNANCY: \"Is there any chance you are pregnant?\" \"When was your last menstrual period?\"        N/A    Protocols used: Diabetes - Low Blood Sugar-ADULT-AH    "

## 2024-11-13 ENCOUNTER — LAB (OUTPATIENT)
Dept: FAMILY MEDICINE CLINIC | Facility: CLINIC | Age: 74
End: 2024-11-13
Payer: MEDICARE

## 2024-11-13 ENCOUNTER — OFFICE VISIT (OUTPATIENT)
Dept: FAMILY MEDICINE CLINIC | Facility: CLINIC | Age: 74
End: 2024-11-13
Payer: MEDICARE

## 2024-11-13 ENCOUNTER — HOSPITAL ENCOUNTER (OUTPATIENT)
Dept: MAMMOGRAPHY | Facility: HOSPITAL | Age: 74
Discharge: HOME | End: 2024-11-13
Admitting: FAMILY MEDICINE
Payer: MEDICARE

## 2024-11-13 VITALS
HEART RATE: 71 BPM | OXYGEN SATURATION: 99 % | DIASTOLIC BLOOD PRESSURE: 82 MMHG | WEIGHT: 139 LBS | HEIGHT: 68 IN | BODY MASS INDEX: 21.07 KG/M2 | RESPIRATION RATE: 18 BRPM | SYSTOLIC BLOOD PRESSURE: 126 MMHG | TEMPERATURE: 98 F

## 2024-11-13 DIAGNOSIS — E03.9 ACQUIRED HYPOTHYROIDISM: ICD-10-CM

## 2024-11-13 DIAGNOSIS — E16.2 LOW BLOOD SUGAR: ICD-10-CM

## 2024-11-13 DIAGNOSIS — Z12.31 SCREENING MAMMOGRAM FOR BREAST CANCER: ICD-10-CM

## 2024-11-13 DIAGNOSIS — E16.2 LOW BLOOD SUGAR: Primary | ICD-10-CM

## 2024-11-13 DIAGNOSIS — R30.0 DYSURIA: Primary | ICD-10-CM

## 2024-11-13 DIAGNOSIS — R30.0 DYSURIA: ICD-10-CM

## 2024-11-13 LAB
ALBUMIN SERPL-MCNC: 4.2 G/DL (ref 3.5–5.2)
ALBUMIN/GLOB SERPL: 1.4 G/DL
ALP SERPL-CCNC: 82 U/L (ref 39–117)
ALT SERPL W P-5'-P-CCNC: 20 U/L (ref 1–33)
ANION GAP SERPL CALCULATED.3IONS-SCNC: 8.4 MMOL/L (ref 5–15)
AST SERPL-CCNC: 24 U/L (ref 1–32)
BILIRUB BLD-MCNC: NEGATIVE MG/DL
BILIRUB SERPL-MCNC: <0.2 MG/DL (ref 0–1.2)
BUN SERPL-MCNC: 15 MG/DL (ref 8–23)
BUN/CREAT SERPL: 14.4 (ref 7–25)
CALCIUM SPEC-SCNC: 9.7 MG/DL (ref 8.6–10.5)
CHLORIDE SERPL-SCNC: 103 MMOL/L (ref 98–107)
CLARITY, POC: CLEAR
CO2 SERPL-SCNC: 28.6 MMOL/L (ref 22–29)
COLOR UR: YELLOW
CREAT SERPL-MCNC: 1.04 MG/DL (ref 0.57–1)
DEPRECATED RDW RBC AUTO: 41.1 FL (ref 37–54)
EGFRCR SERPLBLD CKD-EPI 2021: 55.8 ML/MIN/1.73
ERYTHROCYTE [DISTWIDTH] IN BLOOD BY AUTOMATED COUNT: 12 % (ref 12.3–15.4)
EXPIRATION DATE: ABNORMAL
GLOBULIN UR ELPH-MCNC: 3 GM/DL
GLUCOSE SERPL-MCNC: 80 MG/DL (ref 65–99)
GLUCOSE UR STRIP-MCNC: NEGATIVE MG/DL
HCT VFR BLD AUTO: 42.9 % (ref 34–46.6)
HGB BLD-MCNC: 13.9 G/DL (ref 12–15.9)
KETONES UR QL: NEGATIVE
LEUKOCYTE EST, POC: NEGATIVE
Lab: ABNORMAL
MCH RBC QN AUTO: 30.2 PG (ref 26.6–33)
MCHC RBC AUTO-ENTMCNC: 32.4 G/DL (ref 31.5–35.7)
MCV RBC AUTO: 93.3 FL (ref 79–97)
NITRITE UR-MCNC: NEGATIVE MG/ML
PH UR: 7 [PH] (ref 5–8)
PLATELET # BLD AUTO: 337 10*3/MM3 (ref 140–450)
PMV BLD AUTO: 11.1 FL (ref 6–12)
POTASSIUM SERPL-SCNC: 3.9 MMOL/L (ref 3.5–5.2)
PROT SERPL-MCNC: 7.2 G/DL (ref 6–8.5)
PROT UR STRIP-MCNC: ABNORMAL MG/DL
RBC # BLD AUTO: 4.6 10*6/MM3 (ref 3.77–5.28)
RBC # UR STRIP: NEGATIVE /UL
SODIUM SERPL-SCNC: 140 MMOL/L (ref 136–145)
SP GR UR: 1.01 (ref 1–1.03)
T4 FREE SERPL-MCNC: 0.9 NG/DL (ref 0.92–1.68)
TSH SERPL DL<=0.05 MIU/L-ACNC: 8.99 UIU/ML (ref 0.27–4.2)
UROBILINOGEN UR QL: NORMAL
WBC NRBC COR # BLD AUTO: 6.99 10*3/MM3 (ref 3.4–10.8)

## 2024-11-13 PROCEDURE — 1160F RVW MEDS BY RX/DR IN RCRD: CPT | Performed by: FAMILY MEDICINE

## 2024-11-13 PROCEDURE — 81003 URINALYSIS AUTO W/O SCOPE: CPT | Performed by: FAMILY MEDICINE

## 2024-11-13 PROCEDURE — 84439 ASSAY OF FREE THYROXINE: CPT | Performed by: FAMILY MEDICINE

## 2024-11-13 PROCEDURE — 80053 COMPREHEN METABOLIC PANEL: CPT | Performed by: FAMILY MEDICINE

## 2024-11-13 PROCEDURE — 1125F AMNT PAIN NOTED PAIN PRSNT: CPT | Performed by: FAMILY MEDICINE

## 2024-11-13 PROCEDURE — 36415 COLL VENOUS BLD VENIPUNCTURE: CPT | Performed by: FAMILY MEDICINE

## 2024-11-13 PROCEDURE — 99213 OFFICE O/P EST LOW 20 MIN: CPT | Performed by: FAMILY MEDICINE

## 2024-11-13 PROCEDURE — 77063 BREAST TOMOSYNTHESIS BI: CPT

## 2024-11-13 PROCEDURE — 77067 SCR MAMMO BI INCL CAD: CPT

## 2024-11-13 PROCEDURE — 84443 ASSAY THYROID STIM HORMONE: CPT | Performed by: FAMILY MEDICINE

## 2024-11-13 PROCEDURE — 85027 COMPLETE CBC AUTOMATED: CPT | Performed by: FAMILY MEDICINE

## 2024-11-13 PROCEDURE — 83036 HEMOGLOBIN GLYCOSYLATED A1C: CPT | Performed by: FAMILY MEDICINE

## 2024-11-13 PROCEDURE — 1159F MED LIST DOCD IN RCRD: CPT | Performed by: FAMILY MEDICINE

## 2024-11-14 ENCOUNTER — TELEPHONE (OUTPATIENT)
Dept: FAMILY MEDICINE CLINIC | Facility: CLINIC | Age: 74
End: 2024-11-14

## 2024-11-14 LAB — HBA1C MFR BLD: 5.2 % (ref 4.8–5.6)

## 2024-11-14 RX ORDER — LEVOTHYROXINE SODIUM 50 UG/1
50 TABLET ORAL DAILY
Qty: 30 TABLET | Refills: 5 | Status: SHIPPED | OUTPATIENT
Start: 2024-11-14

## 2024-11-14 NOTE — TELEPHONE ENCOUNTER
It appears that it is the thyroid that is out of whack.  But she actually needs more thyroid not less.  I will send in a new prescription and we will see if that fixes the problem.

## 2024-11-14 NOTE — TELEPHONE ENCOUNTER
Caller: Marci Snyder    Relationship: Self    Best call back number: [phone]     Caller requesting test results: YES    What test was performed: BLOOD WORK     When was the test performed: 11/14/2024    Where was the test performed: IN OFFICE    Additional notes: PATIENT WOULD LIKE A CALL BACK TO DISCUSS TEST RESULTS.

## 2024-11-21 DIAGNOSIS — F43.23 SITUATIONAL MIXED ANXIETY AND DEPRESSIVE DISORDER: ICD-10-CM

## 2024-11-21 NOTE — TELEPHONE ENCOUNTER
Caller: Marci Snyder    Relationship: Self    Best call back number:   Telephone Information:   Mobile [phone]     Requested Prescriptions:   Requested Prescriptions     Pending Prescriptions Disp Refills    buPROPion XL (WELLBUTRIN XL) 150 MG 24 hr tablet 180 tablet 3     Sig: Take 1 tablet by mouth 2 (Two) Times a Day.        Pharmacy where request should be sent: [id number] - [place], [place] - [address] [place] [address] [phone] [facility] [phone]      Last office visit with prescribing clinician: 11/13/2024   Last telemedicine visit with prescribing clinician: Visit date not found   Next office visit with prescribing clinician: Visit date not found     Additional details provided by patient:       Palma Granda Rep   11/21/24 16:25 EST

## 2024-11-22 RX ORDER — BUPROPION HYDROCHLORIDE 150 MG/1
150 TABLET ORAL 2 TIMES DAILY
Qty: 180 TABLET | Refills: 3 | Status: SHIPPED | OUTPATIENT
Start: 2024-11-22
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
## 2025-03-19 ENCOUNTER — OFFICE VISIT (OUTPATIENT)
Dept: FAMILY MEDICINE CLINIC | Facility: CLINIC | Age: 75
End: 2025-03-19
Payer: MEDICARE

## 2025-03-19 VITALS
HEART RATE: 86 BPM | SYSTOLIC BLOOD PRESSURE: 140 MMHG | BODY MASS INDEX: 20.52 KG/M2 | HEIGHT: 68 IN | DIASTOLIC BLOOD PRESSURE: 62 MMHG | WEIGHT: 135.4 LBS | TEMPERATURE: 98.2 F | OXYGEN SATURATION: 98 %

## 2025-03-19 DIAGNOSIS — M47.812 SPONDYLOSIS OF CERVICAL REGION WITHOUT MYELOPATHY OR RADICULOPATHY: ICD-10-CM

## 2025-03-19 DIAGNOSIS — M47.812 CERVICAL SPONDYLOSIS WITHOUT MYELOPATHY: ICD-10-CM

## 2025-03-19 DIAGNOSIS — M48.02 CERVICAL SPINAL STENOSIS: ICD-10-CM

## 2025-03-19 DIAGNOSIS — Z13.820 ENCOUNTER FOR OSTEOPOROSIS SCREENING IN ASYMPTOMATIC POSTMENOPAUSAL PATIENT: ICD-10-CM

## 2025-03-19 DIAGNOSIS — R29.6 FREQUENT FALLS: ICD-10-CM

## 2025-03-19 DIAGNOSIS — M19.041 ARTHRITIS OF BOTH HANDS: ICD-10-CM

## 2025-03-19 DIAGNOSIS — R42 VERTIGO: ICD-10-CM

## 2025-03-19 DIAGNOSIS — E53.8 B12 DEFICIENCY: ICD-10-CM

## 2025-03-19 DIAGNOSIS — M19.042 ARTHRITIS OF BOTH HANDS: ICD-10-CM

## 2025-03-19 DIAGNOSIS — M50.30 DDD (DEGENERATIVE DISC DISEASE), CERVICAL: ICD-10-CM

## 2025-03-19 DIAGNOSIS — Z78.0 ENCOUNTER FOR OSTEOPOROSIS SCREENING IN ASYMPTOMATIC POSTMENOPAUSAL PATIENT: ICD-10-CM

## 2025-03-19 DIAGNOSIS — Z00.00 GENERAL MEDICAL EXAM: ICD-10-CM

## 2025-03-19 DIAGNOSIS — M15.0 PRIMARY OSTEOARTHRITIS INVOLVING MULTIPLE JOINTS: ICD-10-CM

## 2025-03-19 DIAGNOSIS — M47.816 LUMBAR SPONDYLOSIS: ICD-10-CM

## 2025-03-19 DIAGNOSIS — Z00.00 MEDICARE ANNUAL WELLNESS VISIT, SUBSEQUENT: Primary | ICD-10-CM

## 2025-03-19 RX ORDER — TRAMADOL HYDROCHLORIDE 50 MG/1
100 TABLET ORAL EVERY 6 HOURS PRN
Qty: 240 TABLET | Refills: 5 | Status: SHIPPED | OUTPATIENT
Start: 2025-03-19

## 2025-03-19 RX ORDER — LEVOTHYROXINE SODIUM 50 UG/1
50 TABLET ORAL DAILY
Qty: 90 TABLET | Refills: 3 | Status: SHIPPED | OUTPATIENT
Start: 2025-03-19

## 2025-03-19 NOTE — PROGRESS NOTES
Subjective   Marci Snyder is a 74 y.o. female  Medicare Wellness-subsequent (Medicare Wellness ), Fall (Discuss multiple falls and recent surgery), Med Refill (Refill on tramadol and levothyroxine), and Annual Exam      Fall      History of Present Illness    Patient presents today for a preventive medical visit.  Patient is here to determine screening labs and tests that are due and to determine immunization status as well.  Patient will be counseled regarding preventative medicine issues such as regular exercise and healthy diet as well.  The following portions of the patient's history were reviewed and updated as appropriate: allergies, current medications, past social history and problem list    Review of Systems   Constitutional: Negative.    HENT: Negative.     Eyes: Negative.    Respiratory: Negative.     Cardiovascular: Negative.    Gastrointestinal: Negative.    Endocrine: Negative.    Genitourinary: Negative.    Musculoskeletal: Negative.  Positive for arthralgias, back pain, gait problem and myalgias.   Skin: Negative.    Allergic/Immunologic: Negative.    Neurological: Negative.    Hematological: Negative.    Psychiatric/Behavioral: Negative.     All other systems reviewed and are negative.      Objective     Vitals:    03/19/25 1402   BP: 140/62   Pulse: 86   Temp: 98.2 °F (36.8 °C)   SpO2: 98%       Physical Exam  Vitals and nursing note reviewed.   Constitutional:       General: She is not in acute distress.     Appearance: Normal appearance. She is well-developed. She is not ill-appearing, toxic-appearing or diaphoretic.   HENT:      Head: Normocephalic and atraumatic.      Right Ear: External ear normal.      Left Ear: External ear normal.   Eyes:      Conjunctiva/sclera: Conjunctivae normal.      Pupils: Pupils are equal, round, and reactive to light.   Neck:      Thyroid: No thyromegaly.      Vascular: No carotid bruit or JVD.   Cardiovascular:      Rate and Rhythm: Normal rate and regular  rhythm.      Pulses: Normal pulses.      Heart sounds: Normal heart sounds. No murmur heard.  Pulmonary:      Effort: Pulmonary effort is normal. No respiratory distress.      Breath sounds: Normal breath sounds.   Abdominal:      General: Bowel sounds are normal.      Palpations: Abdomen is soft. There is no mass.      Tenderness: There is no abdominal tenderness.   Musculoskeletal:         General: No swelling. Normal range of motion.      Cervical back: Normal range of motion and neck supple.   Lymphadenopathy:      Cervical: No cervical adenopathy.   Skin:     General: Skin is warm and dry.      Findings: No lesion or rash.   Neurological:      Mental Status: She is alert and oriented to person, place, and time.      Cranial Nerves: No cranial nerve deficit.      Sensory: No sensory deficit.      Motor: No weakness.      Coordination: Coordination normal.      Gait: Gait normal.      Deep Tendon Reflexes: Reflexes are normal and symmetric.   Psychiatric:         Mood and Affect: Mood normal.         Behavior: Behavior normal.         Thought Content: Thought content normal.         Judgment: Judgment normal.       ECG 12 Lead    Date/Time: 3/20/2025 12:39 PM  Performed by: Saranya Terrell PA-C    Authorized by: Saranya Terrell PA-C  Comparison: compared with previous ECG from 7/8/2021  Similar to previous ECG  Comparison to previous ECG: No acute changes  Rhythm: sinus rhythm  Rate: normal  BPM: 78  Conduction: conduction normal  ST Segments: ST segments normal  T Waves: T waves normal  QRS axis: normal  Other: no other findings    Clinical impression: normal ECG          Physical Exam    Discussed preventative medicine issues with patient including regular exercise, healthy diet, stress reduction, adequate sleep and recommended age-appropriate screening studies.  Assessment & Plan   Assessment & Plan      Diagnoses and all orders for this visit:    1. Medicare annual wellness visit, subsequent  (Primary)    2. General medical exam    3. DDD (degenerative disc disease), cervical    4. Cervical spinal stenosis    5. Cervical spondylosis without myelopathy    6. Arthritis of both hands    7. Lumbar spondylosis  -     traMADol (ULTRAM) 50 MG tablet; Take 2 tablets by mouth Every 6 (Six) Hours As Needed for Severe Pain.  Dispense: 240 tablet; Refill: 5    8. B12 deficiency    9. Frequent falls  -     Ambulatory Referral to Physical Therapy for Evaluation & Treatment    10. Spondylosis of cervical region without myelopathy or radiculopathy  -     traMADol (ULTRAM) 50 MG tablet; Take 2 tablets by mouth Every 6 (Six) Hours As Needed for Severe Pain.  Dispense: 240 tablet; Refill: 5    11. Primary osteoarthritis involving multiple joints  -     traMADol (ULTRAM) 50 MG tablet; Take 2 tablets by mouth Every 6 (Six) Hours As Needed for Severe Pain.  Dispense: 240 tablet; Refill: 5    12. Vertigo  -     Ambulatory Referral to Physical Therapy for Evaluation & Treatment    13. Encounter for osteoporosis screening in asymptomatic postmenopausal patient  -     DEXA Bone Density Axial; Future    Other orders  -     levothyroxine (Synthroid) 50 MCG tablet; Take 1 tablet by mouth Daily.  Dispense: 90 tablet; Refill: 3  -     ECG 12 Lead       As part of this patient's treatment plan, patient will be prescribed controlled substances. The patient has been made aware of appropriate use of such medications, including potential risk of somnolence, limited ability to drive and /or work safely, and potential for dependence or overdose. It has also been made clear that these medications are for use by this patient only, without concomitant use of alcohol or other substances unless prescribed.Controlled substance status of medication discussed with patient, discussed risks of medication including abuse potential and diversion potential and need to follow up for reevaluation appointment in order to receive further refills.    Part of  this note may be an electronic transcription/translation of spoken language to printed text using the Dragon Dictation System.      Patient or patient representative verbalized consent for the use of Ambient Listening during the visit with  Saranya Terrell PA-C for chart documentation. 3/20/2025  12:40 EDT

## 2025-03-19 NOTE — PROGRESS NOTES
The ABCs of the Annual Wellness Visit  Subsequent Medicare Wellness Visit    Chief Complaint   Patient presents with    Medicare Wellness-subsequent     Medicare Wellness     Fall     Discuss multiple falls and recent surgery    Med Refill     Refill on tramadol and levothyroxine    Annual Exam      Subjective   History of Present Illness:  Marci Snyder is a 74 y.o. female who presents for a Subsequent Medicare Wellness Visit.  History of Present Illness  The patient presents for an annual physical.    The following portions of the patient's history were reviewed and   updated as appropriate: allergies, current medications, past medical history, past social history, past surgical history, and problem list.    Compared to one year ago, the patient feels her physical   health is the same.    Compared to one year ago, the patient feels her mental   health is the same.    Recent Hospitalizations:  She was admitted within the past 365 days at [facility].       Current Medical Providers:  Patient Care Team:  Jimbo Vuong MD as PCP - General (Family Medicine)  Zachary Trevino MD as Consulting Physician (Neurosurgery)  Lynsey Almodovar APRN as Nurse Practitioner (Family Medicine)  Everardo Ridley MD as Consulting Physician (Otolaryngology)    Outpatient Medications Prior to Visit   Medication Sig Dispense Refill    Aller-Chlor 4 MG tablet TAKE 1 TABLET BY MOUTH EVERY DAY 90 tablet 3    buPROPion XL (WELLBUTRIN XL) 150 MG 24 hr tablet Take 1 tablet by mouth 2 (Two) Times a Day. 180 tablet 3    cetirizine (zyrTEC) 10 MG tablet       Cholecalciferol (Vitamin D3) 1.25 MG (23424 UT) tablet Take 1 tablet by mouth Daily. STOPPED TAKING FOR UPCOMING PROCEDURE      DHEA 50 MG capsule Take 1 capsule by mouth Daily.      Diclofenac Sodium (VOLTAREN) 1 % gel gel APPLY 2 GRAMS TOPICALLY TO THE AFFECTED AREA TWICE DAILY AS DIRECTED FOR NECK PAIN 350 g 6    EPINEPHrine (EPIPEN) 0.3 MG/0.3ML solution  auto-injector injection       levocetirizine (XYZAL) 5 MG tablet Take 1 tablet by mouth Daily. 90 tablet 3    Melatonin 10 MG capsule Take 1 capsule by mouth Every Night. Use as needed      methscopolamine (PAMINE) 2.5 MG tablet Take 1 tablet by mouth 4 (Four) Times a Day As Needed (congestion). 120 tablet 11    montelukast (SINGULAIR) 10 MG tablet Take 1 tablet by mouth Every Night. 90 tablet 3    multivitamin with minerals (PRESERVISION AREDS PO) Take 1 tablet by mouth Daily.      mupirocin (BACTROBAN) 2 % ointment Apply 1 Application topically to the appropriate area as directed 3 (Three) Times a Day. 30 g 1    sertraline (Zoloft) 100 MG tablet Take 1.5 daily, new dose 45 tablet 11    levothyroxine (Synthroid) 50 MCG tablet Take 1 tablet by mouth Daily. 30 tablet 5    naproxen (NAPROSYN) 500 MG tablet       pilocarpine (SALAGEN) 5 MG tablet       traMADol (ULTRAM) 50 MG tablet Take 2 tablets by mouth Every 6 (Six) Hours As Needed for Severe Pain. 240 tablet 5     Facility-Administered Medications Prior to Visit   Medication Dose Route Frequency Provider Last Rate Last Admin    cyanocobalamin injection 1,000 mcg  1,000 mcg Intramuscular Q28 Days Saranya Terrell PA-C   1,000 mcg at 10/08/19 1648    cyanocobalamin injection 1,000 mcg  1,000 mcg Intramuscular Q28 Days Saranya Terrell PA-C   1,000 mcg at 01/31/20 1454    cyanocobalamin injection 1,000 mcg  1,000 mcg Intramuscular Q28 Days Saranya Terrell PA-C   1,000 mcg at 11/04/21 1453       Opioid medication/s are on active medication list.  and I have evaluated her active treatment plan and pain score trends (see table).  Vitals:    03/19/25 1402   PainSc: 4      I have reviewed the chart for potential of high risk medication and harmful drug interactions in the elderly.          Aspirin is not on active medication list.  Aspirin use is not indicated based on review of current medical condition/s. Risk of harm outweighs potential benefits.  .    Patient  "Active Problem List   Diagnosis    Osteoporosis    Vitamin D deficiency    Sciatica of right side    Hypothyroidism    Depressive disorder    Osteoarthritis of shoulder    Allergic rhinitis    Benign paroxysmal positional vertigo    Pain in both feet    Acquired metatarsus varus of right foot    Contracture of joint of right foot    Osteomyelitis of right foot    Osteomyelitis of third toe of right foot    Cervical spinal stenosis    DDD (degenerative disc disease), cervical    Cervical spondylosis without myelopathy    Anxiety and depression     Advance Care Planning  ACP discussion was held with the patient during this visit. Pt has at home    Review of Systems      Objective      Vitals:    03/19/25 1402   BP: 140/62   Pulse: 86   Temp: 98.2 °F (36.8 °C)   SpO2: 98%   Weight: 61.4 kg (135 lb 6.4 oz)   Height: 172.7 cm (68\")   PainSc: 4      BMI Readings from Last 1 Encounters:   03/19/25 20.59 kg/m²   BMI is within normal parameters. No follow-up required.    Does the patient have evidence of cognitive impairment? No    Physical Exam  Physical Exam         Results             HEALTH RISK ASSESSMENT    Smoking Status:  Social History     Tobacco Use   Smoking Status Never   Smokeless Tobacco Never     Alcohol Consumption:  Social History     Substance and Sexual Activity   Alcohol Use Yes    Comment: social     Fall Risk Screen:    STEADI Fall Risk Assessment was completed, and patient is at HIGH risk for falls. Assessment completed on:3/19/2025    Depression Screening:      3/19/2025     2:01 PM   PHQ-2/PHQ-9 Depression Screening   Little interest or pleasure in doing things Not at all   Feeling down, depressed, or hopeless Not at all   How difficult have these problems made it for you to do your work, take care of things at home, or get along with other people? Not difficult at all       Health Habits and Functional and Cognitive Screening:      3/19/2025     2:00 PM   Functional & Cognitive Status   Do you have " difficulty preparing food and eating? No   Do you have difficulty bathing yourself, getting dressed or grooming yourself? No   Do you have difficulty using the toilet? No   Do you have difficulty moving around from place to place? No   Do you have trouble with steps or getting out of a bed or a chair? No   Current Diet Well Balanced Diet   Dental Exam Up to date   Eye Exam Up to date   Exercise (times per week) 4 times per week   Current Exercises Include Walking   Do you need help using the phone?  No   Are you deaf or do you have serious difficulty hearing?  No   Do you need help to go to places out of walking distance? Yes   Do you need help shopping? No   Do you need help preparing meals?  No   Do you need help with housework?  No   Do you need help with laundry? No   Do you need help taking your medications? No   Do you need help managing money? No   Do you ever drive or ride in a car without wearing a seat belt? No   Have you felt unusual stress, anger or loneliness in the last month? No   Who do you live with? Spouse   If you need help, do you have trouble finding someone available to you? No   Have you been bothered in the last four weeks by sexual problems? No   Do you have difficulty concentrating, remembering or making decisions? No       Age-appropriate Screening Schedule:  Refer to the list below for future screening recommendations based on patient's age, sex and/or medical conditions. Orders for these recommended tests are listed in the plan section. The patient has been provided with a written plan.    Health Maintenance   Topic Date Due    DXA SCAN  01/31/2022    COVID-19 Vaccine (9 - 2024-25 season) 04/09/2025    ANNUAL WELLNESS VISIT  03/19/2026    TDAP/TD VACCINES (2 - Td or Tdap) 06/10/2034    HEPATITIS C SCREENING  Completed    RSV Vaccine - Adults  Completed    INFLUENZA VACCINE  Completed    Pneumococcal Vaccine 50+  Completed    MAMMOGRAM  Discontinued    HEMOGLOBIN A1C  Discontinued     ZOSTER VACCINE  Discontinued    COLORECTAL CANCER SCREENING  Discontinued              Assessment & Plan     CMS Preventative Services Quick Reference  Risk Factors Identified During Encounter  Fall Risk-High or Moderate: Information on Fall Prevention Shared in After Visit Summary  The above risks/problems have been discussed with the patient.  Follow up actions/plans if indicated are seen below in the Assessment/Plan Section.  Pertinent information has been shared with the patient in the After Visit Summary.    Diagnoses and all orders for this visit:    1. Medicare annual wellness visit, subsequent (Primary)    2. General medical exam    3. DDD (degenerative disc disease), cervical    4. Cervical spinal stenosis    5. Cervical spondylosis without myelopathy    6. Arthritis of both hands    7. Lumbar spondylosis  -     traMADol (ULTRAM) 50 MG tablet; Take 2 tablets by mouth Every 6 (Six) Hours As Needed for Severe Pain.  Dispense: 240 tablet; Refill: 5    8. B12 deficiency    9. Frequent falls  -     Ambulatory Referral to Physical Therapy for Evaluation & Treatment    10. Spondylosis of cervical region without myelopathy or radiculopathy  -     traMADol (ULTRAM) 50 MG tablet; Take 2 tablets by mouth Every 6 (Six) Hours As Needed for Severe Pain.  Dispense: 240 tablet; Refill: 5    11. Primary osteoarthritis involving multiple joints  -     traMADol (ULTRAM) 50 MG tablet; Take 2 tablets by mouth Every 6 (Six) Hours As Needed for Severe Pain.  Dispense: 240 tablet; Refill: 5    12. Vertigo  -     Ambulatory Referral to Physical Therapy for Evaluation & Treatment    13. Encounter for osteoporosis screening in asymptomatic postmenopausal patient  -     DEXA Bone Density Axial; Future    Other orders  -     levothyroxine (Synthroid) 50 MCG tablet; Take 1 tablet by mouth Daily.  Dispense: 90 tablet; Refill: 3      Assessment & Plan      Follow Up:  Return in about 6 months (around 9/19/2025).     An After Visit Summary  and PPPS were given to the patient.           Patient or patient representative verbalized consent for the use of Ambient Listening during the visit with  Saranya Terrell PA-C for chart documentation. 3/20/2025  14:35 EDT

## 2025-03-20 DIAGNOSIS — Z13.820 ENCOUNTER FOR OSTEOPOROSIS SCREENING IN ASYMPTOMATIC POSTMENOPAUSAL PATIENT: Primary | ICD-10-CM

## 2025-03-20 DIAGNOSIS — Z78.0 ENCOUNTER FOR OSTEOPOROSIS SCREENING IN ASYMPTOMATIC POSTMENOPAUSAL PATIENT: Primary | ICD-10-CM

## 2025-06-12 ENCOUNTER — TELEPHONE (OUTPATIENT)
Dept: FAMILY MEDICINE CLINIC | Facility: CLINIC | Age: 75
End: 2025-06-12
Payer: OTHER MISCELLANEOUS

## 2025-06-12 NOTE — TELEPHONE ENCOUNTER
Caller: Marci Snyder    Relationship to patient: Self    Best call back number: [phone]     Chief complaint: NEEDS A CORTISONE OR STEROID SHOT    Type of visit: IN-OFFICE PROCEDURE    Requested date: 6/13/25 OR 6/16/25         Additional notes:PLEASE CALL WITH ANY QUESTIONS OR WHEN COMPLETED.

## 2025-06-13 NOTE — TELEPHONE ENCOUNTER
Patient wanted a cortisone injection in shoulder, we do not do these in office, she did go to the  ED last night for examination

## 2025-06-26 ENCOUNTER — TELEPHONE (OUTPATIENT)
Dept: ORTHOPEDIC SURGERY | Facility: CLINIC | Age: 75
End: 2025-06-26

## 2025-06-26 NOTE — TELEPHONE ENCOUNTER
Provider: JERMAINE    Caller: Marci Snyder    Relationship to Patient: Self    Pharmacy:     Phone Number: [phone]    Reason for Call: PT CALLING TO SEE IF SHE CAN MAKE AN APPT WITH DR DEAN ABOUT A NAIL THAT CAME OFF ON THE RIGHT FOOT THAT DR DEAN DID SX ON IN THE PAST AND HAD TO PARTIALLY AMPUTATE A TOE FOR THE INFECTION SHE GOT THE TOE NAIL PREVIOUSLY - SENDING TE TO CONFIRM OK TO SCHEDULE, PT IS EST WITH DR DEAN

## 2025-07-02 ENCOUNTER — OFFICE VISIT (OUTPATIENT)
Dept: ORTHOPEDIC SURGERY | Facility: CLINIC | Age: 75
End: 2025-07-02
Payer: MEDICARE

## 2025-07-02 VITALS
SYSTOLIC BLOOD PRESSURE: 120 MMHG | WEIGHT: 128 LBS | BODY MASS INDEX: 19.4 KG/M2 | DIASTOLIC BLOOD PRESSURE: 62 MMHG | HEIGHT: 68 IN

## 2025-07-02 DIAGNOSIS — M79.671 RIGHT FOOT PAIN: Primary | ICD-10-CM

## 2025-07-02 DIAGNOSIS — S91.209S AVULSION OF TOENAIL, SEQUELA: ICD-10-CM

## 2025-07-02 RX ORDER — MUPIROCIN 2 %
1 OINTMENT (GRAM) TOPICAL DAILY
Qty: 30 G | Refills: 5 | Status: SHIPPED | OUTPATIENT
Start: 2025-07-02

## 2025-07-02 NOTE — PROGRESS NOTES
NEW PATIENT    Patient: Marci Snyder  : 1950    Primary Care Provider: Jimbo Vuong MD    Requesting Provider: As above    Pain of the Right Foot      History    Chief Complaint: Right great toenail problem    History of Present Illness: This is a very pleasant 74-year-old patient I have seen in the past.  She is here with a new problem.  2 weeks ago her right toenail got caught and was pulled off.  She has not had any fevers or chills, no signs of infection.  She thinks there may be part of the nail remaining.  She has been wearing open sandals.  No change in the significant hallux valgus metatarsals primus varus            Current Outpatient Medications on File Prior to Visit   Medication Sig Dispense Refill    Aller-Chlor 4 MG tablet TAKE 1 TABLET BY MOUTH EVERY DAY 90 tablet 3    buPROPion XL (WELLBUTRIN XL) 150 MG 24 hr tablet Take 1 tablet by mouth 2 (Two) Times a Day. 180 tablet 3    cetirizine (zyrTEC) 10 MG tablet       Cholecalciferol (Vitamin D3) 1.25 MG (29021 UT) tablet Take 1 tablet by mouth Daily. STOPPED TAKING FOR UPCOMING PROCEDURE      DHEA 50 MG capsule Take 1 capsule by mouth Daily.      Diclofenac Sodium (VOLTAREN) 1 % gel gel APPLY 2 GRAMS TOPICALLY TO THE AFFECTED AREA TWICE DAILY AS DIRECTED FOR NECK PAIN 350 g 6    EPINEPHrine (EPIPEN) 0.3 MG/0.3ML solution auto-injector injection       levocetirizine (XYZAL) 5 MG tablet Take 1 tablet by mouth Daily. 90 tablet 3    levothyroxine (Synthroid) 50 MCG tablet Take 1 tablet by mouth Daily. 90 tablet 3    Melatonin 10 MG capsule Take 1 capsule by mouth Every Night. Use as needed      methscopolamine (PAMINE) 2.5 MG tablet Take 1 tablet by mouth 4 (Four) Times a Day As Needed (congestion). 120 tablet 11    montelukast (SINGULAIR) 10 MG tablet Take 1 tablet by mouth Every Night. 90 tablet 3    multivitamin with minerals (PRESERVISION AREDS PO) Take 1 tablet by mouth Daily.      mupirocin (BACTROBAN) 2 % ointment Apply  1 Application topically to the appropriate area as directed 3 (Three) Times a Day. 30 g 1    sertraline (Zoloft) 100 MG tablet Take 1.5 daily, new dose 45 tablet 11    traMADol (ULTRAM) 50 MG tablet Take 2 tablets by mouth Every 6 (Six) Hours As Needed for Severe Pain. 240 tablet 5     Current Facility-Administered Medications on File Prior to Visit   Medication Dose Route Frequency Provider Last Rate Last Admin    cyanocobalamin injection 1,000 mcg  1,000 mcg Intramuscular Q28 Days Saranya Terrell PA-C   1,000 mcg at 10/08/19 1648    cyanocobalamin injection 1,000 mcg  1,000 mcg Intramuscular Q28 Days Saranya Terrell PA-C   1,000 mcg at 01/31/20 1454    cyanocobalamin injection 1,000 mcg  1,000 mcg Intramuscular Q28 Days Saranya Terrell PA-C   1,000 mcg at 11/04/21 1453      Allergies   Allergen Reactions    Penicillins Anaphylaxis     Prev. Tolerated Keflex      Past Medical History:   Diagnosis Date    Allergic rhinitis     Benign paroxysmal positional vertigo     Bursitis/tendonitis, shoulder     Chronic nonsuppurative otitis media     Depressive disorder     Fatigue     Headache, cervicogenic     Hypothyroidism     Insomnia     Myalgia     Nail fungus     Nervousness     OA (osteoarthritis) of shoulder     CHIQUITA (obstructive sleep apnea)     COMPLIANT WITH CPAP USE    Osteoporosis     Preventive measure     Sciatica of right side     TSH (thyroid-stimulating hormone deficiency)     Vitamin D deficiency     Wears glasses      Past Surgical History:   Procedure Laterality Date    AMPUTATION DIGIT Right 07/13/2021    Procedure: AMPUTATE 3RD TOE AT PIP JOINT RIGHT;  Surgeon: Gwendolyn Del Rosario MD;  Location: [facility];  Service: Orthopedics;  Laterality: Right;    CHOLECYSTECTOMY      COLONOSCOPY      FOOT SURGERY Right     pinched nerve    FOOT SURGERY Left 01/17/2019    1st MPJ fusion - Dr. Gwendolyn Del Rosario ; [facility]     GALLBLADDER SURGERY  2023    [place]    NOSE SURGERY  1985     "DEVIATED SEPTUM     Family History   Problem Relation Age of Onset    Stroke Mother     Parkinsonism Mother     Cancer Father             Breast cancer Sister 73    Breast cancer Paternal Aunt         DX AGE UNKNOWN    Breast cancer Paternal Aunt         DX AGE UNKNOWN    Breast cancer Cousin         SEVERAL COUSINS - DX AGES UNKNOWN    Ovarian cancer Neg Hx       Social History     Socioeconomic History    Marital status:    Tobacco Use    Smoking status: Never    Smokeless tobacco: Never   Vaping Use    Vaping status: Never Used   Substance and Sexual Activity    Alcohol use: Yes     Comment: social    Drug use: Never    Sexual activity: Yes     Partners: Male     Birth control/protection: None        Review of Systems   Constitutional: Negative.    HENT: Negative.     Eyes: Negative.    Respiratory: Negative.     Cardiovascular: Negative.    Gastrointestinal: Negative.    Endocrine: Negative.    Genitourinary: Negative.    Musculoskeletal:  Positive for arthralgias.   Skin: Negative.    Allergic/Immunologic: Negative.    Neurological: Negative.    Hematological: Negative.    Psychiatric/Behavioral: Negative.         The following portions of the patient's history were reviewed and updated as appropriate: allergies, current medications, past family history, past medical history, past social history, past surgical history, and problem list.    Physical Exam:   /62   Ht 172.7 cm (67.99\")   Wt 58.1 kg (128 lb)   LMP  (LMP Unknown)   BMI 19.47 kg/m²   Right foot has significant hallux valgus metatarsus primus varus but nontender.  The great toenail has been bluntly avulsed.  There may be a partial medial or lateral nail remnant, the nailbed is sealed, there is no signs of infection         Medical Decision Making    Data Review:   none    Assessment and Plan/ Diagnosis/Treatment options:   1. Avulsion of toenail, sequela  I do not see any signs of infection.  If she is concerned about nail " regrowth or having more of the nail removed I recommend that she see podiatry.  I we will give her prescription for Bactroban to put on the nailbed as needed.  I will see her as needed.  I recommend she see Dr. Singh            Part of this encounter note is an electronic transcription/translation of spoken language to printed text. The electronic translation of spoken language may permit erroneous, or at times, nonsensical words or phrases to be inadvertently transcribed; Although I have reviewed the note for such errors, some may still exist.    NOTE TO PATIENT: The 21st Century Cures Act makes medical notes like these available to patients in the interest of transparency. However, be advised this is a medical document. It is intended as peer to peer communication. It is written in medical language and may contain abbreviations or verbiage that are unfamiliar. It may appear blunt or direct. Medical documents are intended to carry relevant information, facts as evident, and the clinical opinion of the practitioner.       Gwendolyn Huff MD

## 2025-07-05 ENCOUNTER — PATIENT ROUNDING (BHMG ONLY) (OUTPATIENT)
Dept: ORTHOPEDIC SURGERY | Facility: CLINIC | Age: 75
End: 2025-07-05
Payer: OTHER MISCELLANEOUS

## 2025-07-05 NOTE — PROGRESS NOTES
July 5, 2025    Hello, may I speak with Marci Snyder?    My name is Oneida      I am  with [facility]  [address] [place] [address]  [facility] [id number].    Before we get started may I verify your date of birth? 1950    I am calling to officially welcome you to our practice and ask about your recent visit. Is this a good time to talk? No.  Sent Ms. Snyder a [employer] message requesting feedback regarding her new patient visit with Dr. Gwendolyn Del Rosario.        Thank you, and have a great day.

## 2025-07-22 ENCOUNTER — OFFICE VISIT (OUTPATIENT)
Age: 75
End: 2025-07-22
Payer: OTHER MISCELLANEOUS

## 2025-07-22 VITALS
HEIGHT: 68 IN | SYSTOLIC BLOOD PRESSURE: 110 MMHG | WEIGHT: 127.1 LBS | DIASTOLIC BLOOD PRESSURE: 64 MMHG | BODY MASS INDEX: 19.26 KG/M2

## 2025-07-22 DIAGNOSIS — M75.42 IMPINGEMENT SYNDROME OF LEFT SHOULDER: ICD-10-CM
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
## 2025-07-22 DIAGNOSIS — S43.005A DISLOCATION OF LEFT SHOULDER JOINT, INITIAL ENCOUNTER: Primary | ICD-10-CM

## 2025-07-22 DIAGNOSIS — R29.818 PSEUDOPARALYSIS: ICD-10-CM

## 2025-07-22 DIAGNOSIS — S46.002A INJURY OF LEFT ROTATOR CUFF, INITIAL ENCOUNTER: ICD-10-CM

## 2025-07-22 DIAGNOSIS — M75.52 BURSITIS OF LEFT SHOULDER: ICD-10-CM

## 2025-07-22 PROCEDURE — 99214 OFFICE O/P EST MOD 30 MIN: CPT | Performed by: ORTHOPAEDIC SURGERY

## 2025-07-22 NOTE — PROGRESS NOTES
[facility] Orthopaedic Surgery Office Visit - Prince Maravilla MD  [facility]    Office Visit       Patient Name: Marci Snyder    Chief Complaint:   Chief Complaint   Patient presents with   • Left Shoulder - Pain       Referring Physician: No ref. provider found  - I appreciate the referral    History of Present Illness:   Marci Snyder is a 74 y.o. female who presents with left body part: shoulder Reason: pain.  Onset:Onset: mechanical fall. The issue has been ongoing for 1 month(s). Pain is a 9/10 on the pain scale. Pain is described as Pain Characterization: stabbing. Associated symptoms include Symptoms: pain and stiffness. The pain is worse with sleeping, lying on affected side, and any movement of the joint; ice, heat, and pain medication and/or NSAID improve the pain. Previous treatments have included: NSAIDS.  I have reviewed the patient's history of present illness as noted/entered above.    I have reviewed the patient's past medical history, surgical history, social history, family history, medications, and allergies as noted in the electronic medical record and as noted/entered.  I have reviewed the patient's review of systems as noted/enter and updated as noted in the patient's HPI.    Left shoulder pain  Patient reports a dislocation and then transferred to  with subsequent x-rays reviewed personally by me on 6/2/2025.  No evidence of dislocation on the [facility] imaging but she reports that she dislocated the shoulder after a fall and then went to the [place] ER she was transferred to  primarily for concerned about a head injury.  The left shoulder has not had subsequent dislocation since then but she did dislocated again many decades prior.  She does have baseline weakness of the left shoulder and the right counseled we suspect chronic rotator cuff pathology may have developed  MRI can be beneficial to further evaluate the left shoulder for rotator cuff pathology with pain and weakness particular in the setting of acute traumatic dislocation left shoulder.  Left MRI ordered.      74 y.o. female  Body mass index is 19.33 kg/m².    Subjective   Subjective      Review of Systems   Musculoskeletal:  Positive for arthralgias.   All other systems reviewed and are negative.       Past Medical History:   Past Medical History:   Diagnosis Date   • Allergic    • Allergic rhinitis    • Benign paroxysmal positional vertigo    • Bursitis/tendonitis, shoulder    • Chronic nonsuppurative otitis media    • Depressive disorder    • Fatigue    • Headache, cervicogenic    • HL (hearing loss) October 29,2024   • Hypothyroidism    • Insomnia    • Low back pain 10-   • Myalgia    • Nail fungus    • Nervousness    • OA (osteoarthritis) of shoulder    • CHIQUITA (obstructive sleep apnea)     COMPLIANT WITH CPAP USE   • Osteoporosis    • Preventive measure    • Sciatica of right side    • TSH (thyroid-stimulating hormone deficiency)    • Vitamin D deficiency    • Wears glasses        Past Surgical History:   Past Surgical History:   Procedure Laterality Date   • AMPUTATION DIGIT Right 07/13/2021    Procedure: AMPUTATE 3RD TOE AT PIP JOINT RIGHT;  Surgeon: Gwendolyn Del Rosario MD;  Location: [facility];  Service: Orthopedics;  Laterality: Right;   • CHOLECYSTECTOMY  2023   • COLONOSCOPY     • FOOT SURGERY Right     pinched nerve   • FOOT SURGERY Left 01/17/2019    1st MPJ fusion - Dr. Gwendolyn Del Rosario ; [facility]    • GALLBLADDER SURGERY  2023    [place]   • NOSE SURGERY  1985    DEVIATED SEPTUM       Family History:   Family History   Problem Relation Age of Onset   • Stroke Mother    • Parkinsonism Mother    • Cancer Father            • Stroke Father    • Breast cancer Sister 73   • Breast cancer Paternal Aunt         DX AGE UNKNOWN   • Breast cancer Paternal Aunt         DX AGE UNKNOWN   •  Breast cancer Cousin         SEVERAL COUSINS - DX AGES UNKNOWN   • Ovarian cancer Neg Hx        Social History:   Social History     Socioeconomic History   • Marital status:    Tobacco Use   • Smoking status: Never   • Smokeless tobacco: Never   Vaping Use   • Vaping status: Never Used   Substance and Sexual Activity   • Alcohol use: Yes     Comment: social   • Drug use: Never   • Sexual activity: Yes     Partners: Male     Birth control/protection: None       Medications:   Current Outpatient Medications:   •  Aller-Chlor 4 MG tablet, TAKE 1 TABLET BY MOUTH EVERY DAY, Disp: 90 tablet, Rfl: 3  •  buPROPion XL (WELLBUTRIN XL) 150 MG 24 hr tablet, Take 1 tablet by mouth 2 (Two) Times a Day., Disp: 180 tablet, Rfl: 3  •  cetirizine (zyrTEC) 10 MG tablet, , Disp: , Rfl:   •  Cholecalciferol (Vitamin D3) 1.25 MG (32446 UT) tablet, Take 1 tablet by mouth Daily. STOPPED TAKING FOR UPCOMING PROCEDURE, Disp: , Rfl:   •  DHEA 50 MG capsule, Take 1 capsule by mouth Daily., Disp: , Rfl:   •  Diclofenac Sodium (VOLTAREN) 1 % gel gel, APPLY 2 GRAMS TOPICALLY TO THE AFFECTED AREA TWICE DAILY AS DIRECTED FOR NECK PAIN, Disp: 350 g, Rfl: 6  •  EPINEPHrine (EPIPEN) 0.3 MG/0.3ML solution auto-injector injection, , Disp: , Rfl:   •  levocetirizine (XYZAL) 5 MG tablet, Take 1 tablet by mouth Daily., Disp: 90 tablet, Rfl: 3  •  levothyroxine (Synthroid) 50 MCG tablet, Take 1 tablet by mouth Daily., Disp: 90 tablet, Rfl: 3  •  Melatonin 10 MG capsule, Take 1 capsule by mouth Every Night. Use as needed, Disp: , Rfl:   •  methscopolamine (PAMINE) 2.5 MG tablet, Take 1 tablet by mouth 4 (Four) Times a Day As Needed (congestion)., Disp: 120 tablet, Rfl: 11  •  montelukast (SINGULAIR) 10 MG tablet, Take 1 tablet by mouth Every Night., Disp: 90 tablet, Rfl: 3  •  multivitamin with minerals (PRESERVISION AREDS PO), Take 1 tablet by mouth Daily., Disp: , Rfl:   •  mupirocin (BACTROBAN) 2 % ointment, Apply 1 Application topically to the  "appropriate area as directed 3 (Three) Times a Day., Disp: 30 g, Rfl: 1  •  mupirocin (BACTROBAN) 2 % ointment, Apply 1 Application topically to the appropriate area as directed Daily., Disp: 30 g, Rfl: 5  •  sertraline (Zoloft) 100 MG tablet, Take 1.5 daily, new dose, Disp: 45 tablet, Rfl: 11  •  traMADol (ULTRAM) 50 MG tablet, Take 2 tablets by mouth Every 6 (Six) Hours As Needed for Severe Pain., Disp: 240 tablet, Rfl: 5    Current Facility-Administered Medications:   •  cyanocobalamin injection 1,000 mcg, 1,000 mcg, Intramuscular, Q28 Days, Blues, Saranya N, PA-C, 1,000 mcg at 10/08/19 1648  •  cyanocobalamin injection 1,000 mcg, 1,000 mcg, Intramuscular, Q28 Days, Blues, Saranya N, PA-C, 1,000 mcg at 01/31/20 1454  •  cyanocobalamin injection 1,000 mcg, 1,000 mcg, Intramuscular, Q28 Days, Blues, Saranya N, PA-C, 1,000 mcg at 11/04/21 1453    Allergies:   Allergies   Allergen Reactions   • Penicillins Anaphylaxis     Prev. Tolerated Keflex       The following portions of the patient's history were reviewed and updated as appropriate: allergies, current medications, past family history, past medical history, past social history, past surgical history and problem list.        Objective    Objective      Vital Signs:   Vitals:    07/22/25 1336   BP: 110/64   Weight: 57.7 kg (127 lb 1.6 oz)   Height: 172.7 cm (67.99\")       Ortho Exam:  Left shoulder presents with pseudoparalysis.  She does have some apprehension with Aber positioning.  No mike dislocation on clinical exam but known history of recent dislocation.  She has severe weakness with Jobes testing and external rotation testing.  Pain and weakness limit clinical exam.  Positive Neer positive Wu.    Results Review:   Imaging Results (Last 24 Hours)       ** No results found for the last 24 hours. **          CXR Humerus Left  Result Date: 6/2/2025  Redemonstration subcutaneous emphysema left neck. Apparent pleural lines left hemithorax with lung " markings visualized peripherally favored skin folds. Recommend chest radiograph to completely exclude left pneumothorax. Moderate acromioclavicular and mild-to-moderate glenohumeral joint osteoarthrosis. No discrete acute fracture or subluxation. Mild osteoarthritic change about the elbow. Subtle irregularity and lucency involving the posterior most aspect of the olecranon favors degenerative change, recommend correlation with point tenderness to completely exclude acute injury. Mineralized/ossific fragments adjacent to the medial humeral epicondyle, correlate for medial epicondylitis. No significant posterior elbow joint effusion. CRITICAL RESULT:   No. COMMUNICATION: Per this written report. Drafted by Hang Lemons MD on 6/2/2025 6:44 PM Final report signed by Hang Lemons MD on 6/2/2025 6:51 PM    XR Shoulder 2+ View Left  Result Date: 6/2/2025  Redemonstration subcutaneous emphysema left neck. Apparent pleural lines left hemithorax with lung markings visualized peripherally favored skin folds. Recommend chest radiograph to completely exclude left pneumothorax. Moderate acromioclavicular and mild-to-moderate glenohumeral joint osteoarthrosis. No discrete acute fracture or subluxation. Mild osteoarthritic change about the elbow. Subtle irregularity and lucency involving the posterior most aspect of the olecranon favors degenerative change, recommend correlation with point tenderness to completely exclude acute injury. Mineralized/ossific fragments adjacent to the medial humeral epicondyle, correlate for medial epicondylitis. No significant posterior elbow joint effusion. CRITICAL RESULT:   No. COMMUNICATION: Per this written report. Drafted by Hang Lemons MD on 6/2/2025 6:44 PM Final report signed by Hang Lemons MD on 6/2/2025 6:51 PM      I personally reviewed and personally interpreted the imaging above.  No obvious fracture noted on imaging above.  Reported history of dislocation that was  reduced at outside hospital before transferring to .    Procedures             Assessment / Plan      Assessment/Plan:   Problem List Items Addressed This Visit          Musculoskeletal and Injuries    Dislocation of left shoulder joint - Primary    Relevant Orders    MRI Shoulder Left Without Contrast    Injury of left rotator cuff    Relevant Orders    MRI Shoulder Left Without Contrast    Bursitis of left shoulder    Relevant Orders    MRI Shoulder Left Without Contrast    Impingement syndrome of left shoulder    Relevant Orders    MRI Shoulder Left Without Contrast    Cause of injury, fall    Relevant Orders    MRI Shoulder Left Without Contrast       Other    Pseudoparalysis    Relevant Orders    MRI Shoulder Left Without Contrast       Patient reports a dislocation and then transferred to  with subsequent x-rays reviewed personally by me on 6/2/2025.  No evidence of dislocation on the [facility] imaging but she reports that she dislocated the shoulder after a fall and then went to the [place] ER she was transferred to  primarily for concerned about a head injury.  The left shoulder has not had subsequent dislocation since then but she did dislocated again many decades prior.  She does have baseline weakness of the left shoulder and the right counseled we suspect chronic rotator cuff pathology may have developed MRI can be beneficial to further evaluate the left shoulder for rotator cuff pathology with pain and weakness particular in the setting of acute traumatic dislocation left shoulder.  Left MRI ordered.    MRI of the shoulder is recommended.  Indication: suspected rotator cuff tear in the setting of left shoulder dislocation  The MRI is critical to evaluate for rotator cuff tearing and will help with possible surgical planning.      Follow Up: After left shoulder MRI  X-rays at next clinic appointment: None        Prince Maravilla MD, FAAOS  Orthopedic Surgeon, Shoulder Surgery  [facility]  [place]  [facility] and [facility] Medicine  [address], [address]  [place] [address]    & New Location:  [facility] Location  [address], [address]  [place] [address]    07/22/25  15:50 EDT

## (undated) DEVICE — CONTAINER,SPECIMEN,OR STERILE,4OZ: Brand: MEDLINE

## (undated) DEVICE — SUT ETHLN 4/0 PS2 18IN 1667H

## (undated) DEVICE — UNDERGLV SURG BIOGEL INDICAT PF 61/2 GRN

## (undated) DEVICE — PENCL ROCKRSWCH MEGADYNE W/HOLSTR 10FT SS

## (undated) DEVICE — PK EXTREM LOWR 10

## (undated) DEVICE — UNDERCAST PADDING: Brand: DEROYAL

## (undated) DEVICE — GUARDIAN LVC: Brand: GUARDIAN

## (undated) DEVICE — GLV SURG SIGNATURE TOUCH PF LTX 7 STRL

## (undated) DEVICE — HANDPIECE SET WITH HIGH FLOW TIP AND SUCTION TUBE: Brand: INTERPULSE

## (undated) DEVICE — SHOE, POST-OPERATIVE: Brand: DEROYAL

## (undated) DEVICE — SPNG GZ WOVN 4X4IN 12PLY 10/BX STRL

## (undated) DEVICE — 1010 S-DRAPE TOWEL DRAPE 10/BX: Brand: STERI-DRAPE™

## (undated) DEVICE — DRSNG TELFA PAD NONADH STR 1S 3X8IN

## (undated) DEVICE — DRAPE,TOP,102X53,STERILE: Brand: MEDLINE

## (undated) DEVICE — PATIENT RETURN ELECTRODE, SINGLE-USE, CONTACT QUALITY MONITORING, ADULT, WITH 9FT CORD, FOR PATIENTS WEIGING OVER 33LBS. (15KG): Brand: MEGADYNE

## (undated) DEVICE — APPL CHLORAPREP TINTED 26ML TEAL

## (undated) DEVICE — SUT MNCRYL 2/0 SH 27IN UD MCP417H

## (undated) DEVICE — BNDG ELAS ELITE V/CLOSE 4IN 5YD LF STRL

## (undated) DEVICE — INTENDED FOR TISSUE SEPARATION, AND OTHER PROCEDURES THAT REQUIRE A SHARP SURGICAL BLADE TO PUNCTURE OR CUT.: Brand: BARD-PARKER ® SAFETYLOCK CARBON RIB-BACK BLADES